# Patient Record
Sex: MALE | Race: WHITE | NOT HISPANIC OR LATINO | Employment: OTHER | URBAN - METROPOLITAN AREA
[De-identification: names, ages, dates, MRNs, and addresses within clinical notes are randomized per-mention and may not be internally consistent; named-entity substitution may affect disease eponyms.]

---

## 2020-05-02 ENCOUNTER — HOSPITAL ENCOUNTER (INPATIENT)
Facility: HOSPITAL | Age: 70
LOS: 5 days | Discharge: HOME HEALTH | End: 2020-05-08
Attending: EMERGENCY MEDICINE | Admitting: THORACIC SURGERY (CARDIOTHORACIC VASCULAR SURGERY)

## 2020-05-02 ENCOUNTER — APPOINTMENT (OUTPATIENT)
Dept: GENERAL RADIOLOGY | Facility: HOSPITAL | Age: 70
End: 2020-05-02

## 2020-05-02 DIAGNOSIS — I25.110 CORONARY ARTERY DISEASE INVOLVING NATIVE CORONARY ARTERY OF NATIVE HEART WITH UNSTABLE ANGINA PECTORIS (HCC): ICD-10-CM

## 2020-05-02 DIAGNOSIS — Z95.1 S/P CABG X 4: ICD-10-CM

## 2020-05-02 DIAGNOSIS — I21.4 SUBENDOCARDIAL MI FIRST EPISODE CARE (HCC): Primary | ICD-10-CM

## 2020-05-02 PROBLEM — E78.5 DYSLIPIDEMIA: Chronic | Status: ACTIVE | Noted: 2020-05-02

## 2020-05-02 PROBLEM — E78.5 DYSLIPIDEMIA: Status: ACTIVE | Noted: 2020-05-02

## 2020-05-02 PROBLEM — I10 ESSENTIAL HYPERTENSION: Chronic | Status: ACTIVE | Noted: 2020-05-02

## 2020-05-02 PROBLEM — I10 ESSENTIAL HYPERTENSION: Status: ACTIVE | Noted: 2020-05-02

## 2020-05-02 LAB
ANION GAP SERPL CALCULATED.3IONS-SCNC: 12 MMOL/L (ref 5–15)
APTT PPP: 25.2 SECONDS (ref 61–76.5)
APTT PPP: 37.6 SECONDS (ref 61–76.5)
BASOPHILS # BLD AUTO: 0.1 10*3/MM3 (ref 0–0.2)
BASOPHILS NFR BLD AUTO: 1.4 % (ref 0–1.5)
BUN BLD-MCNC: 17 MG/DL (ref 8–23)
BUN/CREAT SERPL: 16.8 (ref 7–25)
CALCIUM SPEC-SCNC: 8.9 MG/DL (ref 8.6–10.5)
CHLORIDE SERPL-SCNC: 104 MMOL/L (ref 98–107)
CO2 SERPL-SCNC: 26 MMOL/L (ref 22–29)
CREAT BLD-MCNC: 1.01 MG/DL (ref 0.76–1.27)
DEPRECATED RDW RBC AUTO: 45.5 FL (ref 37–54)
EOSINOPHIL # BLD AUTO: 0.2 10*3/MM3 (ref 0–0.4)
EOSINOPHIL NFR BLD AUTO: 3.2 % (ref 0.3–6.2)
ERYTHROCYTE [DISTWIDTH] IN BLOOD BY AUTOMATED COUNT: 14.1 % (ref 12.3–15.4)
GFR SERPL CREATININE-BSD FRML MDRD: 73 ML/MIN/1.73
GLUCOSE BLD-MCNC: 108 MG/DL (ref 65–99)
HCT VFR BLD AUTO: 46.3 % (ref 37.5–51)
HGB BLD-MCNC: 15.8 G/DL (ref 13–17.7)
INR PPP: 1 (ref 0.9–1.1)
LYMPHOCYTES # BLD AUTO: 1.7 10*3/MM3 (ref 0.7–3.1)
LYMPHOCYTES NFR BLD AUTO: 25 % (ref 19.6–45.3)
MCH RBC QN AUTO: 31.7 PG (ref 26.6–33)
MCHC RBC AUTO-ENTMCNC: 34.1 G/DL (ref 31.5–35.7)
MCV RBC AUTO: 93.2 FL (ref 79–97)
MONOCYTES # BLD AUTO: 0.7 10*3/MM3 (ref 0.1–0.9)
MONOCYTES NFR BLD AUTO: 9.8 % (ref 5–12)
NEUTROPHILS # BLD AUTO: 4.1 10*3/MM3 (ref 1.7–7)
NEUTROPHILS NFR BLD AUTO: 60.6 % (ref 42.7–76)
NRBC BLD AUTO-RTO: 0.1 /100 WBC (ref 0–0.2)
PLATELET # BLD AUTO: 269 10*3/MM3 (ref 140–450)
PMV BLD AUTO: 8.7 FL (ref 6–12)
POTASSIUM BLD-SCNC: 4 MMOL/L (ref 3.5–5.2)
PROTHROMBIN TIME: 10.5 SECONDS (ref 9.6–11.7)
RBC # BLD AUTO: 4.97 10*6/MM3 (ref 4.14–5.8)
SODIUM BLD-SCNC: 142 MMOL/L (ref 136–145)
TROPONIN T SERPL-MCNC: 0.4 NG/ML (ref 0–0.03)
TROPONIN T SERPL-MCNC: 0.5 NG/ML (ref 0–0.03)
WBC NRBC COR # BLD: 6.8 10*3/MM3 (ref 3.4–10.8)
WHOLE BLOOD HOLD SPECIMEN: NORMAL

## 2020-05-02 PROCEDURE — 93005 ELECTROCARDIOGRAM TRACING: CPT

## 2020-05-02 PROCEDURE — 99284 EMERGENCY DEPT VISIT MOD MDM: CPT

## 2020-05-02 PROCEDURE — 80048 BASIC METABOLIC PNL TOTAL CA: CPT | Performed by: EMERGENCY MEDICINE

## 2020-05-02 PROCEDURE — 99223 1ST HOSP IP/OBS HIGH 75: CPT | Performed by: INTERNAL MEDICINE

## 2020-05-02 PROCEDURE — G0378 HOSPITAL OBSERVATION PER HR: HCPCS

## 2020-05-02 PROCEDURE — 85730 THROMBOPLASTIN TIME PARTIAL: CPT | Performed by: EMERGENCY MEDICINE

## 2020-05-02 PROCEDURE — 71045 X-RAY EXAM CHEST 1 VIEW: CPT

## 2020-05-02 PROCEDURE — 85730 THROMBOPLASTIN TIME PARTIAL: CPT | Performed by: PHYSICIAN ASSISTANT

## 2020-05-02 PROCEDURE — 85610 PROTHROMBIN TIME: CPT | Performed by: EMERGENCY MEDICINE

## 2020-05-02 PROCEDURE — 25010000002 HEPARIN (PORCINE) PER 1000 UNITS: Performed by: EMERGENCY MEDICINE

## 2020-05-02 PROCEDURE — 99253 IP/OBS CNSLTJ NEW/EST LOW 45: CPT | Performed by: INTERNAL MEDICINE

## 2020-05-02 PROCEDURE — 85025 COMPLETE CBC W/AUTO DIFF WBC: CPT | Performed by: EMERGENCY MEDICINE

## 2020-05-02 PROCEDURE — 84484 ASSAY OF TROPONIN QUANT: CPT | Performed by: EMERGENCY MEDICINE

## 2020-05-02 PROCEDURE — 84484 ASSAY OF TROPONIN QUANT: CPT | Performed by: PHYSICIAN ASSISTANT

## 2020-05-02 PROCEDURE — 93005 ELECTROCARDIOGRAM TRACING: CPT | Performed by: EMERGENCY MEDICINE

## 2020-05-02 RX ORDER — ALUMINA, MAGNESIA, AND SIMETHICONE 2400; 2400; 240 MG/30ML; MG/30ML; MG/30ML
15 SUSPENSION ORAL EVERY 6 HOURS PRN
Status: DISCONTINUED | OUTPATIENT
Start: 2020-05-02 | End: 2020-05-04 | Stop reason: HOSPADM

## 2020-05-02 RX ORDER — CHOLECALCIFEROL (VITAMIN D3) 125 MCG
5 CAPSULE ORAL NIGHTLY PRN
Status: DISCONTINUED | OUTPATIENT
Start: 2020-05-02 | End: 2020-05-04 | Stop reason: HOSPADM

## 2020-05-02 RX ORDER — LISINOPRIL 10 MG/1
20 TABLET ORAL 2 TIMES DAILY
COMMUNITY
End: 2020-05-08 | Stop reason: HOSPADM

## 2020-05-02 RX ORDER — LISINOPRIL 5 MG/1
10 TABLET ORAL ONCE
Status: COMPLETED | OUTPATIENT
Start: 2020-05-02 | End: 2020-05-02

## 2020-05-02 RX ORDER — HEPARIN SODIUM 10000 [USP'U]/100ML
10.4 INJECTION, SOLUTION INTRAVENOUS
Status: DISCONTINUED | OUTPATIENT
Start: 2020-05-02 | End: 2020-05-03

## 2020-05-02 RX ORDER — MAGNESIUM SULFATE HEPTAHYDRATE 40 MG/ML
2 INJECTION, SOLUTION INTRAVENOUS AS NEEDED
Status: DISCONTINUED | OUTPATIENT
Start: 2020-05-02 | End: 2020-05-04 | Stop reason: HOSPADM

## 2020-05-02 RX ORDER — ACETAMINOPHEN 325 MG/1
650 TABLET ORAL EVERY 4 HOURS PRN
Status: DISCONTINUED | OUTPATIENT
Start: 2020-05-02 | End: 2020-05-04 | Stop reason: HOSPADM

## 2020-05-02 RX ORDER — ASPIRIN 81 MG/1
81 TABLET ORAL DAILY
Status: DISCONTINUED | OUTPATIENT
Start: 2020-05-03 | End: 2020-05-04 | Stop reason: HOSPADM

## 2020-05-02 RX ORDER — ONDANSETRON 4 MG/1
4 TABLET, FILM COATED ORAL EVERY 6 HOURS PRN
Status: DISCONTINUED | OUTPATIENT
Start: 2020-05-02 | End: 2020-05-04 | Stop reason: HOSPADM

## 2020-05-02 RX ORDER — LISINOPRIL 5 MG/1
10 TABLET ORAL
Status: DISCONTINUED | OUTPATIENT
Start: 2020-05-02 | End: 2020-05-02

## 2020-05-02 RX ORDER — SODIUM CHLORIDE 0.9 % (FLUSH) 0.9 %
10 SYRINGE (ML) INJECTION AS NEEDED
Status: DISCONTINUED | OUTPATIENT
Start: 2020-05-02 | End: 2020-05-04 | Stop reason: HOSPADM

## 2020-05-02 RX ORDER — VALACYCLOVIR HYDROCHLORIDE 500 MG/1
1000 TABLET, FILM COATED ORAL EVERY 12 HOURS PRN
Status: DISCONTINUED | OUTPATIENT
Start: 2020-05-02 | End: 2020-05-04 | Stop reason: HOSPADM

## 2020-05-02 RX ORDER — NITROGLYCERIN 0.4 MG/1
0.4 TABLET SUBLINGUAL
Status: DISCONTINUED | OUTPATIENT
Start: 2020-05-02 | End: 2020-05-04 | Stop reason: HOSPADM

## 2020-05-02 RX ORDER — ACETAMINOPHEN 160 MG/5ML
650 SOLUTION ORAL EVERY 4 HOURS PRN
Status: DISCONTINUED | OUTPATIENT
Start: 2020-05-02 | End: 2020-05-04 | Stop reason: HOSPADM

## 2020-05-02 RX ORDER — MELOXICAM 15 MG/1
15 TABLET ORAL DAILY
Status: DISCONTINUED | OUTPATIENT
Start: 2020-05-03 | End: 2020-05-02

## 2020-05-02 RX ORDER — ONDANSETRON 2 MG/ML
4 INJECTION INTRAMUSCULAR; INTRAVENOUS EVERY 6 HOURS PRN
Status: DISCONTINUED | OUTPATIENT
Start: 2020-05-02 | End: 2020-05-04 | Stop reason: HOSPADM

## 2020-05-02 RX ORDER — MELOXICAM 15 MG/1
15 TABLET ORAL DAILY
COMMUNITY
End: 2020-05-08 | Stop reason: HOSPADM

## 2020-05-02 RX ORDER — POTASSIUM CHLORIDE 20 MEQ/1
40 TABLET, EXTENDED RELEASE ORAL AS NEEDED
Status: DISCONTINUED | OUTPATIENT
Start: 2020-05-02 | End: 2020-05-04 | Stop reason: HOSPADM

## 2020-05-02 RX ORDER — LISINOPRIL 20 MG/1
20 TABLET ORAL 2 TIMES DAILY
Status: DISCONTINUED | OUTPATIENT
Start: 2020-05-03 | End: 2020-05-04 | Stop reason: HOSPADM

## 2020-05-02 RX ORDER — MAGNESIUM SULFATE 1 G/100ML
1 INJECTION INTRAVENOUS AS NEEDED
Status: DISCONTINUED | OUTPATIENT
Start: 2020-05-02 | End: 2020-05-04 | Stop reason: HOSPADM

## 2020-05-02 RX ORDER — ASPIRIN 325 MG
162 TABLET ORAL ONCE
Status: COMPLETED | OUTPATIENT
Start: 2020-05-02 | End: 2020-05-02

## 2020-05-02 RX ORDER — ATORVASTATIN CALCIUM 40 MG/1
40 TABLET, FILM COATED ORAL NIGHTLY
Status: DISCONTINUED | OUTPATIENT
Start: 2020-05-02 | End: 2020-05-04 | Stop reason: HOSPADM

## 2020-05-02 RX ORDER — SODIUM CHLORIDE 0.9 % (FLUSH) 0.9 %
10 SYRINGE (ML) INJECTION EVERY 12 HOURS SCHEDULED
Status: DISCONTINUED | OUTPATIENT
Start: 2020-05-02 | End: 2020-05-04 | Stop reason: HOSPADM

## 2020-05-02 RX ORDER — VALACYCLOVIR HYDROCHLORIDE 500 MG/1
1000 TABLET, FILM COATED ORAL 2 TIMES DAILY PRN
COMMUNITY
End: 2020-06-15 | Stop reason: SDUPTHER

## 2020-05-02 RX ORDER — NITROGLYCERIN 20 MG/100ML
5-200 INJECTION INTRAVENOUS
Status: DISCONTINUED | OUTPATIENT
Start: 2020-05-02 | End: 2020-05-04 | Stop reason: HOSPADM

## 2020-05-02 RX ORDER — BISACODYL 10 MG
10 SUPPOSITORY, RECTAL RECTAL DAILY PRN
Status: DISCONTINUED | OUTPATIENT
Start: 2020-05-02 | End: 2020-05-04 | Stop reason: HOSPADM

## 2020-05-02 RX ORDER — HEPARIN SODIUM 10000 [USP'U]/100ML
10.4 INJECTION, SOLUTION INTRAVENOUS
Status: DISCONTINUED | OUTPATIENT
Start: 2020-05-02 | End: 2020-05-02 | Stop reason: SDUPTHER

## 2020-05-02 RX ORDER — ACETAMINOPHEN 650 MG/1
650 SUPPOSITORY RECTAL EVERY 4 HOURS PRN
Status: DISCONTINUED | OUTPATIENT
Start: 2020-05-02 | End: 2020-05-04 | Stop reason: HOSPADM

## 2020-05-02 RX ADMIN — METOPROLOL TARTRATE 25 MG: 25 TABLET, FILM COATED ORAL at 20:48

## 2020-05-02 RX ADMIN — ATORVASTATIN CALCIUM 40 MG: 40 TABLET, FILM COATED ORAL at 20:48

## 2020-05-02 RX ADMIN — NITROGLYCERIN 5 MCG/MIN: 20 INJECTION INTRAVENOUS at 17:40

## 2020-05-02 RX ADMIN — LISINOPRIL 10 MG: 5 TABLET ORAL at 16:47

## 2020-05-02 RX ADMIN — ASPIRIN 325 MG ORAL TABLET 162 MG: 325 PILL ORAL at 15:56

## 2020-05-02 RX ADMIN — HEPARIN SODIUM AND DEXTROSE 10.4 UNITS/KG/HR: 10000; 5 INJECTION INTRAVENOUS at 15:55

## 2020-05-02 RX ADMIN — LISINOPRIL 10 MG: 5 TABLET ORAL at 20:53

## 2020-05-02 NOTE — ED PROVIDER NOTES
Subjective   History of Present Illness  Context: 70-year-old male presents with some sharp chest pain that started when he tried to jog about 3 weeks ago after a truck broke down.  He had noted some shortness of breath.  He states he quit his cholesterol medicine arthritis medicine and stopped his metoprolol after his blood pressure improved when he had weight loss.  He states he decided to try to get healthy about 3 weeks ago.  He noted chest discomfort last night and again today which she described more as a tightness that lasted about a half an hour he had no shortness of breath nausea diaphoresis.  He states this was different than the pain he had had previously.  Location: Chest  Quality: Tightness  Duration: Onset yesterday  Timing: Intermittent  Severity: Moderate   Modifying factors:  Associated signs and symptoms: He is noted he is been more short of breath with exertion recently    Review of Systems   Constitutional: Negative for diaphoresis, fever and unexpected weight change.        Reports he improved his diet has had recent proximally 5 to 10 pound weight loss   HENT: Negative for congestion and sore throat.    Eyes: Negative for pain.   Respiratory: Positive for chest tightness and shortness of breath. Negative for cough.    Cardiovascular: Positive for chest pain. Negative for leg swelling.   Gastrointestinal: Negative for abdominal pain, diarrhea, nausea and vomiting.   Genitourinary: Negative for dysuria and urgency.   Musculoskeletal: Positive for arthralgias.   Skin: Negative for rash.   Neurological: Negative for dizziness, weakness and headaches.   Psychiatric/Behavioral: Negative for confusion.       History reviewed. No pertinent past medical history.  Arthritis, hypertension, hyperlipidemia  Allergies   Allergen Reactions   • Neosporin [Bacitracin-Polymyxin B] Other (See Comments)     Burning sensation        History reviewed. No pertinent surgical history.    No family history on  file.    Social History     Socioeconomic History   • Marital status:      Spouse name: Not on file   • Number of children: Not on file   • Years of education: Not on file   • Highest education level: Not on file     Prior to Admission medications    Not on File     Lisinopril.  He recently discontinued metoprolol cholesterol medicine and medicine for his arthritis      Objective   Physical Exam  70 y.o. male Generally well-developed well-nourished,  Pupils equal round react to light.  Extraocular muscles intact, sclera nonicteric  Oropharynx clear, mucous membranes moist,   neck supple    Cardiovascular regular rate and rhythm without murmur gallop rub appreciated,  Respiratory lungs clear with equal breath sounds bilaterally  Abdomen soft and nontender without mass rebound or guarding  Extremities without tenderness edema  Neurologic without obvious focal findings noted motor sensory grossly intact extremities  Psychiatric cooperative  Dermatologic no significant rash or bruising noted    Procedures           ED Course      Results for orders placed or performed during the hospital encounter of 05/02/20   Basic Metabolic Panel   Result Value Ref Range    Glucose 108 (H) 65 - 99 mg/dL    BUN 17 8 - 23 mg/dL    Creatinine 1.01 0.76 - 1.27 mg/dL    Sodium 142 136 - 145 mmol/L    Potassium 4.0 3.5 - 5.2 mmol/L    Chloride 104 98 - 107 mmol/L    CO2 26.0 22.0 - 29.0 mmol/L    Calcium 8.9 8.6 - 10.5 mg/dL    eGFR Non African Amer 73 >60 mL/min/1.73    BUN/Creatinine Ratio 16.8 7.0 - 25.0    Anion Gap 12.0 5.0 - 15.0 mmol/L   Troponin   Result Value Ref Range    Troponin T 0.398 (C) 0.000 - 0.030 ng/mL   CBC Auto Differential   Result Value Ref Range    WBC 6.80 3.40 - 10.80 10*3/mm3    RBC 4.97 4.14 - 5.80 10*6/mm3    Hemoglobin 15.8 13.0 - 17.7 g/dL    Hematocrit 46.3 37.5 - 51.0 %    MCV 93.2 79.0 - 97.0 fL    MCH 31.7 26.6 - 33.0 pg    MCHC 34.1 31.5 - 35.7 g/dL    RDW 14.1 12.3 - 15.4 %    RDW-SD 45.5 37.0 -  "54.0 fl    MPV 8.7 6.0 - 12.0 fL    Platelets 269 140 - 450 10*3/mm3    Neutrophil % 60.6 42.7 - 76.0 %    Lymphocyte % 25.0 19.6 - 45.3 %    Monocyte % 9.8 5.0 - 12.0 %    Eosinophil % 3.2 0.3 - 6.2 %    Basophil % 1.4 0.0 - 1.5 %    Neutrophils, Absolute 4.10 1.70 - 7.00 10*3/mm3    Lymphocytes, Absolute 1.70 0.70 - 3.10 10*3/mm3    Monocytes, Absolute 0.70 0.10 - 0.90 10*3/mm3    Eosinophils, Absolute 0.20 0.00 - 0.40 10*3/mm3    Basophils, Absolute 0.10 0.00 - 0.20 10*3/mm3    nRBC 0.1 0.0 - 0.2 /100 WBC     Xr Chest 1 View    Result Date: 5/2/2020  No active cardiopulmonary disease.   Electronically Signed By-Albino Mckinley DO. On:5/2/2020 3:02 PM This report was finalized on [id number] by  Albino Mckinley DO..    Medications   sodium chloride 0.9 % flush 10 mL (has no administration in time range)   aspirin tablet 162 mg (has no administration in time range)   heparin bolus from bag 5,000 Units (has no administration in time range)   heparin 44073 units/250 ml (100 units/ml) in D5W (has no administration in time range)   heparin bolus from bag 2,500 Units (has no administration in time range)   heparin bolus from bag 5,000 Units (has no administration in time range)     /78   Pulse 58   Temp 98.2 °F (36.8 °C) (Oral)   Resp 18   Ht 188 cm (74\")   Wt 95.7 kg (210 lb 15.7 oz)   SpO2 97%   BMI 27.09 kg/m²                                        MDM  Chart review: No previous visits  Comorbidity: As per past history  Differential: Unstable angina, reflux, congestive heart failure  My EKG interpretation: Sinus rhythm without acute abnormality or previous to compare to  Lab: Troponin elevated at 0.398, CBC normal basic metabolic panel essentially normal glucose 108  Radiology: I reviewed chest x-ray no acute cardiopulmonary abnormality noted  Discussion/treatment: Patient IV placed.  He was given aspirin and started on heparin.  He is currently pain-free.  He was discussed with PA for the hospitalist " and Dr. Godinez.  He will be admitted for further care and evaluation he will require cardiac catheterization for definitive evaluation  He was evaluated using appropriate PPE      Final diagnoses:   Subendocardial MI first episode care (CMS/[facility])            Justin Toth MD  05/02/20 4019

## 2020-05-02 NOTE — CONSULTS
Cardiology Consult Note      REQUESTING PHYSICIAN    Riaz Gutierrez MD    PATIENT IDENTIFICATION    Name:@ Age: 70 y.o. Sex:@ : @ MRN:@      Cardiology assessment and plan    Non-ST elevation myocardial infarction  Chest pain  Coronary artery disease  Hypertension/uncontrolled hypertension  Hyperlipidemia  EKG with no evidence of any ST elevation    Plan  Heparin drip  Recommend aspirin statin beta-blockers  Start patient on nitrates  Aggressive blood pressure control  Aggressive risk factor modification  Monitor patient closely  Plan for left heart catheterization and coronary angiography tomorrow  N.p.o. after midnight  Risk benefits and alternatives to cardiac catheterization discussed with the patient  Further recommendations based on the results of the cardiac catheterization        REASON FOR CONSULTATION:  70-year-old male with no known history of ischemic heart disease.  Patient does have a risk factors that include hypertension, dyslipidemia.      CC:  Chest discomfort    HISTORY OF PRESENT ILLNESS:   Patient presented to the emergency department with report of chest discomfort described as a pain/pressure in his midsternal chest area.  Patient stated this is been occurring off and on for the last several days.  He reports he has undertaken a more healthy lifestyle with jogging, dieting and has recently lost approximately 10 pounds.  Patient checks his blood pressures at home noting that a were much lower.  He self discontinued his blood pressure medication and statin.  This morning, he midsternal chest discomfort was more severe and sharp in nature.  He decided to come to the emergency department for further examination.  Patient denies any radiation of the pain, no associated symptoms such as nausea or diaphoresis.  He has had no shortness of breath.  In the ER, work-up included troponin which resulted at 0.398.  Additional labs unremarkable.  EKG shows normal sinus rhythm.  Chest x-ray  "with no acute findings.      REVIEW OF SYSTEMS:  Pertinent items are noted in HPI, all other systems reviewed and negative  Patient denies any headache no nausea vomiting diarrhea no hematemesis no melena no abdominal pain no dizziness no syncope no musculoskeletal symptoms no  symptoms    OBJECTIVE       ASSESSMENT/PLAN    Subendocardial MI first episode care (CMS/[facility])    Essential hypertension    Dyslipidemia          Vital Signs  Visit Vitals  BP (!) 202/100 (BP Location: Left arm, Patient Position: Lying)   Pulse 66   Temp 98.4 °F (36.9 °C) (Oral)   Resp 18   Ht 188 cm (74\")   Wt 94 kg (207 lb 3.7 oz)   SpO2 98%   BMI 26.61 kg/m²     Oxygen Therapy  SpO2: 98 %  Pulse Oximetry Type: Intermittent  Device (Oxygen Therapy): room air  Device (Oxygen Therapy): room air  Flowsheet Rows      First Filed Value   Admission Height  188 cm (74\") Documented at 05/02/2020 1403   Admission Weight  95.7 kg (210 lb 15.7 oz) Documented at 05/02/2020 1403        Intake & Output (last 3 days)     None        Lines, Drains & Airways    Active LDAs     Name:   Placement date:   Placement time:   Site:   Days:    Peripheral IV 05/02/20 1428 Right Antecubital   05/02/20    1428    Antecubital   less than 1                MEDICAL HISTORY    History reviewed. No pertinent past medical history.     SURGICAL HISTORY    History reviewed. No pertinent surgical history.     FAMILY HISTORY    No family history on file.    SOCIAL HISTORY    Social History     Tobacco Use   • Smoking status: Not on file   Substance Use Topics   • Alcohol use: Not on file        ALLERGIES    Allergies   Allergen Reactions   • Neosporin [Bacitracin-Polymyxin B] Other (See Comments)     Burning sensation               BP (!) 202/100 (BP Location: Left arm, Patient Position: Lying)   Pulse 66   Temp 98.4 °F (36.9 °C) (Oral)   Resp 18   Ht 188 cm (74\")   Wt 94 kg (207 lb 3.7 oz)   SpO2 98%   BMI 26.61 kg/m²   Intake/Output last 3 shifts:  No intake/output " data recorded.  Intake/Output this shift:  No intake/output data recorded.    PHYSICAL EXAM:    General: Alert, cooperative, no distress, appears stated age  Head:  Normocephalic, atraumatic, mucous membranes moist  Eyes:  Conjunctiva/corneas clear, EOM's intact     Neck:  Supple,  no adenopathy;      Lungs: Clear to auscultation bilaterally, no wheezes rhonchi rales are noted  Chest wall: No tenderness  Heart::  Regular rate and rhythm, S1 and S2 normal, no murmur, rub or gallop  Abdomen: Soft, non-tender, nondistended bowel sounds active  Extremities: No cyanosis, clubbing, or edema groin soft no hematoma.  Amputation of right middle finger  Pulses: 2+ and symmetric all extremities  Skin:  No rashes or lesions  Neuro/psych: A&O x3. CN II through XII are grossly intact with appropriate affect      Scheduled Meds:        [START ON 5/3/2020] aspirin 81 mg Oral Daily   atorvastatin 40 mg Oral Nightly   lisinopril 10 mg Oral Q24H   metoprolol tartrate 25 mg Oral Q12H       Continuous Infusions:      heparin 10.4 Units/kg/hr Last Rate: 10.4 Units/kg/hr (05/02/20 1555)       PRN Meds:    heparin  •  heparin  •  [COMPLETED] Insert peripheral IV **AND** sodium chloride        Results Review:     I reviewed the patient's new clinical results.    CBC    Results from last 7 days   Lab Units 05/02/20  1442   WBC 10*3/mm3 6.80   HEMOGLOBIN g/dL 15.8   PLATELETS 10*3/mm3 269     Cr Clearance Estimated Creatinine Clearance: 93.1 mL/min (by C-G formula based on SCr of 1.01 mg/dL).  Coag   Results from last 7 days   Lab Units 05/02/20  1442   INR  1.00   APTT seconds 25.2*     HbA1C No results found for: HGBA1C  Blood Glucose No results found for: POCGLU  Infection     CMP   Results from last 7 days   Lab Units 05/02/20  1442   SODIUM mmol/L 142   POTASSIUM mmol/L 4.0   CHLORIDE mmol/L 104   CO2 mmol/L 26.0   BUN mg/dL 17   CREATININE mg/dL 1.01   GLUCOSE mg/dL 108*     ABG      UA      LONNIE  No results found for: POCMETH,  POCAMPHET, POCBARBITUR, POCBENZO, POCCOCAINE, POCOPIATES, POCOXYCODO, POCPHENCYC, POCPROPOXY, POCTHC, POCTRICYC  Lysis Labs   Results from last 7 days   Lab Units 05/02/20  1442   INR  1.00   APTT seconds 25.2*   HEMOGLOBIN g/dL 15.8   PLATELETS 10*3/mm3 269   CREATININE mg/dL 1.01     Radiology(recent) Xr Chest 1 View    Result Date: 5/2/2020  No active cardiopulmonary disease.   Electronically Signed By-Albino Mckinley DO. On:5/2/2020 3:02 PM This report was finalized on [id number] by  Albino Mckinley DO..        Results from last 7 days   Lab Units 05/02/20  1442   TROPONIN T ng/mL 0.398*       Xrays, labs reviewed personally by physician.    ECG/EMG Results (most recent)     Procedure Component Value Units Date/Time    ECG 12 Lead [[id number]] Collected:  05/02/20 1412     Updated:  05/02/20 1415    Narrative:       HEART RATE= 69  bpm  RR Interval= 868  ms  WV Interval= 210  ms  P Horizontal Axis= -8  deg  P Front Axis= 43  deg  QRSD Interval= 76  ms  QT Interval= 382  ms  QRS Axis= 9  deg  T Wave Axis= 25  deg  - NORMAL ECG -  Sinus rhythm  No previous ECG available for comparison  Electronically Signed By:   Date and Time of Study: 2020-05-02 14:12:41            Medication Review:   I have reviewed the patient's current medication list  Scheduled Meds:    [START ON 5/3/2020] aspirin 81 mg Oral Daily   atorvastatin 40 mg Oral Nightly   lisinopril 10 mg Oral Q24H   metoprolol tartrate 25 mg Oral Q12H     Continuous Infusions:    heparin 10.4 Units/kg/hr Last Rate: 10.4 Units/kg/hr (05/02/20 3455)     PRN Meds:.heparin  •  heparin  •  [COMPLETED] Insert peripheral IV **AND** sodium chloride    Imaging:  Imaging Results (Last 72 Hours)     Procedure Component Value Units Date/Time    XR Chest 1 View [[id number]] Collected:  05/02/20 1502     Updated:  05/02/20 1504    Narrative:       XR CHEST 1 VW-     Date of Exam: 5/2/2020 2:56 PM     Indication: pain  70-year-old male with chest discomfort, shortness  of  breath     Comparison: None available.     Technique: 1 view(s) of the chest were obtained.     FINDINGS: The lungs are well expanded. Cardiac, hilar and  mediastinal silhouettes are normal. No pneumothorax, pleural effusion or  focal pulmonary parenchymal opacity. Pulmonary vascularity is within  normal limits. The trachea is midline. Visualized bony structures are  intact.       Impression:       No active cardiopulmonary disease.        Electronically Signed By-Albino Mckinley DO. On:5/2/2020 3:02 PM  This report was finalized on [id number] by  Albino Mckinley DO..

## 2020-05-02 NOTE — H&P
[facility] Medicine Services      Patient Name: Keshawn Lr  : 1950  MRN: [id number]  Primary Care Physician: Daphne, No Known  Date of admission: 2020    Patient Care Team:  Provider, No Known as PCP - General          Subjective   History Present Illness     Chief Complaint:   Chief Complaint   Patient presents with   • Chest Pain       Mr. Lr is a 70 y.o. male presents to [facility] ER 2020 complaining dull, pressure-like chest pain that started around noon 3 PM yesterday and lasted for about 20 minutes.  He said nothing made it better or worse but resolved on its own.  Then he had the same pain again at 4 AM this morning and lasted about 30 minutes.  This time he took ibuprofen and fell back asleep.  He came into the ED today because he just did not feel right.  Patient otherwise denies nausea, vomiting, fever, chills, diarrhea, constipation, abdominal pain or recent illness.  Patient states that he first noticed some chest tightness about 3 weeks ago but did not think anything of it at that time.  Patient states that over the past few months he has had some adjusting of his blood pressure meds as they have not been working.  Patient states that he change his diet to just fruit and vegetables and lost about 10 pounds in 1 week.  His blood pressure also went down accordingly.  His blood pressure at home was in the 150s over 90s and after his diet it was in the 130s over 80s.    In the ED, troponin of 0.398, otherwise unremarkable BMP and CBC.  Chest x-ray read no acute cardiopulmonary process.  EKG showed normal sinus rhythm at 69 bpm and no acute ST changes.  Patient was given aspirin, heparin drip, and ER provider consulted with cardiology in the ED for heart cath for tomorrow.    Review of Systems   Constitution: Negative.   HENT: Negative.    Cardiovascular: Positive for chest pain (dull, pressure like pain in center of chest).   Respiratory:  Negative.    Skin: Negative.    Musculoskeletal: Negative.    Gastrointestinal: Negative.    Genitourinary: Negative.    Neurological: Negative.    Psychiatric/Behavioral: Negative.            Personal History     Past Medical History:   Past Medical History:   Diagnosis Date   • Hyperlipidemia    • Hypertension        Surgical History:      Past Surgical History:   Procedure Laterality Date   • FINGER SURGERY     • UVULECTOMY             Family History: family history includes Atrial fibrillation in his sister; Hypertension in his brother, father, and mother. Otherwise pertinent FHx was reviewed and unremarkable.     Social History:  reports that he has never smoked. He has never used smokeless tobacco. He reports that he drinks alcohol. He reports that he does not use drugs.      Medications:  Prior to Admission medications    Medication Sig Start Date End Date Taking? Authorizing Provider   lisinopril (PRINIVIL,ZESTRIL) 10 MG tablet Take 10 mg by mouth Daily.   Yes ProviderLeilani MD   metoprolol tartrate (LOPRESSOR) 25 MG tablet Take 25 mg by mouth 2 (Two) Times a Day.   Yes Leilani Serna MD   NON FORMULARY Daily. Cholesterol med. Last dose was over 3 weeks ago.   Yes ProviderLeilani MD       Allergies:    Allergies   Allergen Reactions   • Neosporin [Bacitracin-Polymyxin B] Other (See Comments)     Burning sensation        Objective   Objective     Vital Signs  Temp:  [98.2 °F (36.8 °C)-98.4 °F (36.9 °C)] 98.2 °F (36.8 °C)  Heart Rate:  [58-80] 62  Resp:  [16-22] 16  BP: (148-204)/() 164/92  SpO2:  [96 %-100 %] 96 %  on   ;   Device (Oxygen Therapy): room air  Body mass index is 26.61 kg/m².    Physical Exam   Constitutional: He is oriented to person, place, and time. He appears well-developed and well-nourished. No distress.   HENT:   Head: Normocephalic and atraumatic.   Nose: Nose normal.   Mouth/Throat: No oropharyngeal exudate.   Eyes: Conjunctivae and EOM are normal.   Neck:  Normal range of motion.   Cardiovascular: Normal rate, regular rhythm, normal heart sounds and intact distal pulses.   S1, S2 audible.   Pulmonary/Chest: Effort normal and breath sounds normal. No respiratory distress. He has no wheezes.   On room air.   Abdominal: Soft. Bowel sounds are normal. He exhibits no distension. There is no tenderness.   Musculoskeletal: Normal range of motion. He exhibits no edema, tenderness or deformity.   Neurological: He is alert and oriented to person, place, and time. No cranial nerve deficit.   Skin: Skin is warm. No rash noted. He is not diaphoretic. No erythema.   Psychiatric: He has a normal mood and affect. His behavior is normal.   Nursing note and vitals reviewed.      Results Review:  I have personally reviewed most recent cardiac tracings, lab results and radiology images and interpretations and agree with findings.    Results from last 7 days   Lab Units 05/02/20  1442   WBC 10*3/mm3 6.80   HEMOGLOBIN g/dL 15.8   HEMATOCRIT % 46.3   PLATELETS 10*3/mm3 269   INR  1.00     Results from last 7 days   Lab Units 05/02/20  2206 05/02/20  1442   SODIUM mmol/L  --  142   POTASSIUM mmol/L  --  4.0   CHLORIDE mmol/L  --  104   CO2 mmol/L  --  26.0   BUN mg/dL  --  17   CREATININE mg/dL  --  1.01   GLUCOSE mg/dL  --  108*   CALCIUM mg/dL  --  8.9   TROPONIN T ng/mL 0.501* 0.398*     Estimated Creatinine Clearance: 93.1 mL/min (by C-G formula based on SCr of 1.01 mg/dL).  Brief Urine Lab Results     None          Microbiology Results (last 10 days)     ** No results found for the last 240 hours. **          ECG/EMG Results (most recent)     Procedure Component Value Units Date/Time    ECG 12 Lead [[id number]] Collected:  05/02/20 1412     Updated:  05/02/20 1415    Narrative:       HEART RATE= 69  bpm  RR Interval= 868  ms  ID Interval= 210  ms  P Horizontal Axis= -8  deg  P Front Axis= 43  deg  QRSD Interval= 76  ms  QT Interval= 382  ms  QRS Axis= 9  deg  T Wave Axis= 25  deg  -  NORMAL ECG -  Sinus rhythm  No previous ECG available for comparison  Electronically Signed By:   Date and Time of Study: 2020-05-02 14:12:41                    Xr Chest 1 View    Result Date: 5/2/2020  No active cardiopulmonary disease.   Electronically Signed By-Albino Mckinley DO. On:5/2/2020 3:02 PM This report was finalized on [id number] by  Albino Mckinley DO..        Estimated Creatinine Clearance: 93.1 mL/min (by C-G formula based on SCr of 1.01 mg/dL).    Assessment/Plan   Assessment/Plan       Active Hospital Problems    Diagnosis  POA   • **Subendocardial MI first episode care (CMS/[facility]) [I21.4]  Yes   • Essential hypertension [I10]  Yes   • Dyslipidemia [E78.5]  Yes      Resolved Hospital Problems   No resolved problems to display.         Non-ST elevation MI  -Rule out ACS  -Initial troponin of 0.398, otherwise unremarkable BMP and CBC  -Chest x-ray read no acute cardiopulmonary process.    -EKG showed normal sinus rhythm at 69 bpm and no acute ST changes.  -Patient is afebrile, pulse 70s on room air oxygen at 97% SPO2  -Initial /111 but has come down to 152/78.  - Patient was given aspirin, heparin drip in ED  -ER provider consulted with cardiology in the ED for heart cath for tomorrow.  -Check serial troponins, check lipid panel  -Nitro drip, heparin drip  -Heart healthy diet and n.p.o. at midnight    Essential hypertension, uncontrolled  -continue lopressor and lisinopril    Hyperlipidemia  -Currently diet controlled    Arthritis  -hold home mobic for cath in AM      VTE Prophylaxis - heparin drip, SCDs  Mechanical Order History:     None      Pharmalogical Order History:     Ordered     Dose Route Frequency Stop    05/02/20 1530  heparin bolus from bag 5,000 Units      5,000 Units IV Once --    05/02/20 1530  heparin 90391 units/250 ml (100 units/ml) in D5W  9.95 mL/hr      10.4 Units/kg/hr IV Titrated --          CODE STATUS:    Code Status and Medical Interventions:   Ordered at: 05/02/20  1643     Code Status:    CPR     Medical Interventions (Level of Support Prior to Arrest):    Full       This patient has been examined wearing appropriate Personal Protective Equipment . 05/02/20      I discussed the patient's findings and my recommendations with patient.        Electronically signed by MAIRA Rapp, 05/02/20, 3:36 PM.  [facility] ([facility]

## 2020-05-02 NOTE — PLAN OF CARE
Problem: Patient Care Overview  Goal: Plan of Care Review  Outcome: Ongoing (interventions implemented as appropriate)  Flowsheets (Taken 5/2/2020 6915)  Progress: no change  Plan of Care Reviewed With: patient  Outcome Summary: New admit- patient is not complaining of any chest pain and has not had any chest pain since yesterday, labs WNL, BP is elevated-started on nitro drip and heparin drip and all home BP meds are restarted but still elevated and MD is aware. Meds need to be reconciled again because they were wrong, but fixed now, page out to MD to update. Cardiology saw the patient and will have a heart cath tomorrow, next aPTT at 2200, patient gets very anxious at the hospital as well and says his BP usually is stable at home and is high because of being in the hospital. Will monitor

## 2020-05-03 ENCOUNTER — APPOINTMENT (OUTPATIENT)
Dept: CARDIOLOGY | Facility: HOSPITAL | Age: 70
End: 2020-05-03

## 2020-05-03 ENCOUNTER — ANESTHESIA EVENT (OUTPATIENT)
Dept: PERIOP | Facility: HOSPITAL | Age: 70
End: 2020-05-03

## 2020-05-03 PROBLEM — I25.110 CORONARY ARTERY DISEASE INVOLVING NATIVE CORONARY ARTERY OF NATIVE HEART WITH UNSTABLE ANGINA PECTORIS: Status: ACTIVE | Noted: 2020-05-02

## 2020-05-03 PROBLEM — I25.118 CORONARY ARTERY DISEASE OF NATIVE ARTERY OF NATIVE HEART WITH STABLE ANGINA PECTORIS: Status: ACTIVE | Noted: 2020-05-03

## 2020-05-03 LAB
ABO GROUP BLD: NORMAL
ALBUMIN SERPL-MCNC: 4.1 G/DL (ref 3.5–5.2)
ALBUMIN/GLOB SERPL: 1.4 G/DL
ALP SERPL-CCNC: 82 U/L (ref 39–117)
ALT SERPL W P-5'-P-CCNC: 31 U/L (ref 1–41)
ANION GAP SERPL CALCULATED.3IONS-SCNC: 11 MMOL/L (ref 5–15)
ANION GAP SERPL CALCULATED.3IONS-SCNC: 9 MMOL/L (ref 5–15)
APTT PPP: 24.7 SECONDS (ref 61–76.5)
APTT PPP: 42.3 SECONDS (ref 61–76.5)
ARTERIAL PATENCY WRIST A: POSITIVE
AST SERPL-CCNC: 35 U/L (ref 1–40)
ATMOSPHERIC PRESS: ABNORMAL MM[HG]
BACTERIA UR QL AUTO: ABNORMAL /HPF
BASE EXCESS BLDA CALC-SCNC: 0.3 MMOL/L (ref 0–3)
BASOPHILS # BLD AUTO: 0.1 10*3/MM3 (ref 0–0.2)
BASOPHILS NFR BLD AUTO: 0.9 % (ref 0–1.5)
BDY SITE: ABNORMAL
BH CV XLRA MEAS - DIST GSV CALF DIST LEFT: 0.17 CM
BH CV XLRA MEAS - DIST GSV CALF DIST RIGHT: 0.17 CM
BH CV XLRA MEAS - DIST GSV THIGH DIST LEFT: 0.14 CM
BH CV XLRA MEAS - DIST GSV THIGH DIST RIGHT: 0.18 CM
BH CV XLRA MEAS - MID GSV CALF LEFT: 0.1 CM
BH CV XLRA MEAS - MID GSV CALF RIGHT: 0.18 CM
BH CV XLRA MEAS - MID GSV THIGH  LEFT: 0.1 CM
BH CV XLRA MEAS - MID GSV THIGH  RIGHT: 0.13 CM
BH CV XLRA MEAS - PROX GSV CALF DIST LEFT: 0.13 CM
BH CV XLRA MEAS - PROX GSV CALF DIST RIGHT: 0.16 CM
BH CV XLRA MEAS - PROX GSV THIGH  LEFT: 0.15 CM
BH CV XLRA MEAS - PROX GSV THIGH  RIGHT: 0.09 CM
BH CV XLRA MEAS LEFT CCA RATIO VEL: 116 CM/SEC
BH CV XLRA MEAS LEFT DIST CCA EDV: -16.5 CM/SEC
BH CV XLRA MEAS LEFT DIST CCA PSV: -67.8 CM/SEC
BH CV XLRA MEAS LEFT DIST ICA EDV: -22.5 CM/SEC
BH CV XLRA MEAS LEFT DIST ICA PSV: -63.5 CM/SEC
BH CV XLRA MEAS LEFT ICA RATIO VEL: -80.6 CM/SEC
BH CV XLRA MEAS LEFT ICA/CCA RATIO: -0.69
BH CV XLRA MEAS LEFT PROX CCA EDV: 30.1 CM/SEC
BH CV XLRA MEAS LEFT PROX CCA PSV: 116 CM/SEC
BH CV XLRA MEAS LEFT PROX ECA PSV: -118 CM/SEC
BH CV XLRA MEAS LEFT PROX ICA EDV: -26 CM/SEC
BH CV XLRA MEAS LEFT PROX ICA PSV: -80.6 CM/SEC
BH CV XLRA MEAS LEFT PROX SCLA PSV: 154 CM/SEC
BH CV XLRA MEAS LEFT VERTEBRAL A PSV: 58.7 CM/SEC
BH CV XLRA MEAS RIGHT CCA RATIO VEL: 93.2 CM/SEC
BH CV XLRA MEAS RIGHT DIST CCA EDV: 18.4 CM/SEC
BH CV XLRA MEAS RIGHT DIST CCA PSV: 77.3 CM/SEC
BH CV XLRA MEAS RIGHT DIST ICA EDV: -26.5 CM/SEC
BH CV XLRA MEAS RIGHT DIST ICA PSV: -74.7 CM/SEC
BH CV XLRA MEAS RIGHT ICA RATIO VEL: -80.4 CM/SEC
BH CV XLRA MEAS RIGHT ICA/CCA RATIO: -0.86
BH CV XLRA MEAS RIGHT PROX CCA EDV: 17.4 CM/SEC
BH CV XLRA MEAS RIGHT PROX CCA PSV: 93.2 CM/SEC
BH CV XLRA MEAS RIGHT PROX ECA PSV: -138 CM/SEC
BH CV XLRA MEAS RIGHT PROX ICA EDV: -28.6 CM/SEC
BH CV XLRA MEAS RIGHT PROX ICA PSV: -80.4 CM/SEC
BH CV XLRA MEAS RIGHT PROX SCLA PSV: 145 CM/SEC
BH CV XLRA MEAS RIGHT VERTEBRAL A PSV: -60 CM/SEC
BILIRUB SERPL-MCNC: 0.7 MG/DL (ref 0.2–1.2)
BILIRUB UR QL STRIP: NEGATIVE
BLD GP AB SCN SERPL QL: NEGATIVE
BUN BLD-MCNC: 11 MG/DL (ref 8–23)
BUN BLD-MCNC: 14 MG/DL (ref 8–23)
BUN/CREAT SERPL: 11.8 (ref 7–25)
BUN/CREAT SERPL: 13.9 (ref 7–25)
CA-I BLDA-SCNC: 0.96 MMOL/L (ref 1.15–1.33)
CALCIUM SPEC-SCNC: 8.5 MG/DL (ref 8.6–10.5)
CALCIUM SPEC-SCNC: 8.6 MG/DL (ref 8.6–10.5)
CHLORIDE SERPL-SCNC: 105 MMOL/L (ref 98–107)
CHLORIDE SERPL-SCNC: 106 MMOL/L (ref 98–107)
CHOLEST SERPL-MCNC: 142 MG/DL (ref 0–200)
CLARITY UR: CLEAR
CLOSE TME COLL+ADP + EPINEP PNL BLD: 95 % (ref 86–100)
CO2 BLDA-SCNC: 25.7 MMOL/L (ref 22–29)
CO2 SERPL-SCNC: 24 MMOL/L (ref 22–29)
CO2 SERPL-SCNC: 27 MMOL/L (ref 22–29)
COLOR UR: YELLOW
CREAT BLD-MCNC: 0.93 MG/DL (ref 0.76–1.27)
CREAT BLD-MCNC: 1.01 MG/DL (ref 0.76–1.27)
D-LACTATE SERPL-SCNC: 0.5 MMOL/L (ref 0.5–2)
DEPRECATED RDW RBC AUTO: 45.5 FL (ref 37–54)
EOSINOPHIL # BLD AUTO: 0.3 10*3/MM3 (ref 0–0.4)
EOSINOPHIL NFR BLD AUTO: 4.4 % (ref 0.3–6.2)
ERYTHROCYTE [DISTWIDTH] IN BLOOD BY AUTOMATED COUNT: 14 % (ref 12.3–15.4)
GFR SERPL CREATININE-BSD FRML MDRD: 73 ML/MIN/1.73
GFR SERPL CREATININE-BSD FRML MDRD: 81 ML/MIN/1.73
GIANT PLATELETS: NORMAL
GLOBULIN UR ELPH-MCNC: 3 GM/DL
GLUCOSE BLD-MCNC: 118 MG/DL (ref 65–99)
GLUCOSE BLD-MCNC: 121 MG/DL (ref 65–99)
GLUCOSE BLDC GLUCOMTR-MCNC: 118 MG/DL (ref 74–100)
GLUCOSE UR STRIP-MCNC: NEGATIVE MG/DL
HCO3 BLDA-SCNC: 24.6 MMOL/L (ref 21–28)
HCT VFR BLD AUTO: 41.3 % (ref 37.5–51)
HCT VFR BLDA CALC: 44 % (ref 38–51)
HDLC SERPL-MCNC: 37 MG/DL (ref 40–60)
HEMODILUTION: NO
HGB BLD-MCNC: 14.3 G/DL (ref 13–17.7)
HGB BLDA-MCNC: 15 G/DL (ref 12–17)
HGB UR QL STRIP.AUTO: ABNORMAL
HOROWITZ INDEX BLD+IHG-RTO: 21 %
HYALINE CASTS UR QL AUTO: ABNORMAL /LPF
INR PPP: 1.06 (ref 0.9–1.1)
KETONES UR QL STRIP: NEGATIVE
LDLC SERPL CALC-MCNC: 83 MG/DL (ref 0–100)
LDLC/HDLC SERPL: 2.25 {RATIO}
LEUKOCYTE ESTERASE UR QL STRIP.AUTO: NEGATIVE
LYMPHOCYTES # BLD AUTO: 2 10*3/MM3 (ref 0.7–3.1)
LYMPHOCYTES NFR BLD AUTO: 29.6 % (ref 19.6–45.3)
MAGNESIUM SERPL-MCNC: 2.2 MG/DL (ref 1.6–2.4)
MCH RBC QN AUTO: 31.8 PG (ref 26.6–33)
MCHC RBC AUTO-ENTMCNC: 34.5 G/DL (ref 31.5–35.7)
MCV RBC AUTO: 92 FL (ref 79–97)
MODALITY: ABNORMAL
MONOCYTES # BLD AUTO: 0.6 10*3/MM3 (ref 0.1–0.9)
MONOCYTES NFR BLD AUTO: 8.5 % (ref 5–12)
MRSA DNA SPEC QL NAA+PROBE: NORMAL
NEUTROPHILS # BLD AUTO: 3.9 10*3/MM3 (ref 1.7–7)
NEUTROPHILS NFR BLD AUTO: 56.6 % (ref 42.7–76)
NITRITE UR QL STRIP: NEGATIVE
NRBC BLD AUTO-RTO: 0.2 /100 WBC (ref 0–0.2)
PCO2 BLDA: 37.9 MM HG (ref 35–48)
PH BLDA: 7.42 PH UNITS (ref 7.35–7.45)
PH UR STRIP.AUTO: 7.5 [PH] (ref 5–8)
PLATELET # BLD AUTO: 237 10*3/MM3 (ref 140–450)
PMV BLD AUTO: 8.8 FL (ref 6–12)
PO2 BLDA: 80.5 MM HG (ref 83–108)
POTASSIUM BLD-SCNC: 4.2 MMOL/L (ref 3.5–5.2)
POTASSIUM BLD-SCNC: 4.7 MMOL/L (ref 3.5–5.2)
POTASSIUM BLDA-SCNC: 3.9 MMOL/L (ref 3.5–4.5)
PROT SERPL-MCNC: 7.1 G/DL (ref 6–8.5)
PROT UR QL STRIP: NEGATIVE
PROTHROMBIN TIME: 11 SECONDS (ref 9.6–11.7)
RBC # BLD AUTO: 4.49 10*6/MM3 (ref 4.14–5.8)
RBC # UR: ABNORMAL /HPF
RBC MORPH BLD: NORMAL
REF LAB TEST METHOD: ABNORMAL
RH BLD: POSITIVE
SAO2 % BLDCOA: 96.1 % (ref 94–98)
SARS-COV-2 RNA RESP QL NAA+PROBE: NOT DETECTED
SODIUM BLD-SCNC: 140 MMOL/L (ref 136–145)
SODIUM BLD-SCNC: 142 MMOL/L (ref 136–145)
SODIUM BLDA-SCNC: 140 MMOL/L (ref 138–146)
SP GR UR STRIP: 1.07 (ref 1–1.03)
SQUAMOUS #/AREA URNS HPF: ABNORMAL /HPF
T&S EXPIRATION DATE: NORMAL
TRIGL SERPL-MCNC: 108 MG/DL (ref 0–150)
TROPONIN T SERPL-MCNC: 0.4 NG/ML (ref 0–0.03)
UROBILINOGEN UR QL STRIP: ABNORMAL
VLDLC SERPL-MCNC: 21.6 MG/DL
WBC MORPH BLD: NORMAL
WBC NRBC COR # BLD: 6.9 10*3/MM3 (ref 3.4–10.8)
WBC UR QL AUTO: ABNORMAL /HPF

## 2020-05-03 PROCEDURE — 0 IOPAMIDOL PER 1 ML: Performed by: INTERNAL MEDICINE

## 2020-05-03 PROCEDURE — 86900 BLOOD TYPING SEROLOGIC ABO: CPT

## 2020-05-03 PROCEDURE — 80061 LIPID PANEL: CPT | Performed by: NURSE PRACTITIONER

## 2020-05-03 PROCEDURE — 4A023N7 MEASUREMENT OF CARDIAC SAMPLING AND PRESSURE, LEFT HEART, PERCUTANEOUS APPROACH: ICD-10-PCS | Performed by: INTERNAL MEDICINE

## 2020-05-03 PROCEDURE — 82803 BLOOD GASES ANY COMBINATION: CPT

## 2020-05-03 PROCEDURE — 99254 IP/OBS CNSLTJ NEW/EST MOD 60: CPT | Performed by: NURSE PRACTITIONER

## 2020-05-03 PROCEDURE — 93458 L HRT ARTERY/VENTRICLE ANGIO: CPT | Performed by: INTERNAL MEDICINE

## 2020-05-03 PROCEDURE — 80048 BASIC METABOLIC PNL TOTAL CA: CPT | Performed by: PHYSICIAN ASSISTANT

## 2020-05-03 PROCEDURE — 83036 HEMOGLOBIN GLYCOSYLATED A1C: CPT | Performed by: NURSE PRACTITIONER

## 2020-05-03 PROCEDURE — 87635 SARS-COV-2 COVID-19 AMP PRB: CPT | Performed by: NURSE PRACTITIONER

## 2020-05-03 PROCEDURE — 83735 ASSAY OF MAGNESIUM: CPT | Performed by: PHYSICIAN ASSISTANT

## 2020-05-03 PROCEDURE — B2111ZZ FLUOROSCOPY OF MULTIPLE CORONARY ARTERIES USING LOW OSMOLAR CONTRAST: ICD-10-PCS | Performed by: INTERNAL MEDICINE

## 2020-05-03 PROCEDURE — C1894 INTRO/SHEATH, NON-LASER: HCPCS | Performed by: INTERNAL MEDICINE

## 2020-05-03 PROCEDURE — 85576 BLOOD PLATELET AGGREGATION: CPT | Performed by: NURSE PRACTITIONER

## 2020-05-03 PROCEDURE — 85730 THROMBOPLASTIN TIME PARTIAL: CPT | Performed by: INTERNAL MEDICINE

## 2020-05-03 PROCEDURE — 86900 BLOOD TYPING SEROLOGIC ABO: CPT | Performed by: NURSE PRACTITIONER

## 2020-05-03 PROCEDURE — 85025 COMPLETE CBC W/AUTO DIFF WBC: CPT | Performed by: PHYSICIAN ASSISTANT

## 2020-05-03 PROCEDURE — 86901 BLOOD TYPING SEROLOGIC RH(D): CPT | Performed by: NURSE PRACTITIONER

## 2020-05-03 PROCEDURE — 85610 PROTHROMBIN TIME: CPT | Performed by: INTERNAL MEDICINE

## 2020-05-03 PROCEDURE — 25010000002 ONDANSETRON PER 1 MG: Performed by: INTERNAL MEDICINE

## 2020-05-03 PROCEDURE — 93880 EXTRACRANIAL BILAT STUDY: CPT

## 2020-05-03 PROCEDURE — B2151ZZ FLUOROSCOPY OF LEFT HEART USING LOW OSMOLAR CONTRAST: ICD-10-PCS | Performed by: INTERNAL MEDICINE

## 2020-05-03 PROCEDURE — 99232 SBSQ HOSP IP/OBS MODERATE 35: CPT | Performed by: INTERNAL MEDICINE

## 2020-05-03 PROCEDURE — 25010000002 MIDAZOLAM PER 1 MG: Performed by: INTERNAL MEDICINE

## 2020-05-03 PROCEDURE — 86850 RBC ANTIBODY SCREEN: CPT | Performed by: NURSE PRACTITIONER

## 2020-05-03 PROCEDURE — 80051 ELECTROLYTE PANEL: CPT

## 2020-05-03 PROCEDURE — 83605 ASSAY OF LACTIC ACID: CPT

## 2020-05-03 PROCEDURE — 93970 EXTREMITY STUDY: CPT

## 2020-05-03 PROCEDURE — 25010000002 FENTANYL CITRATE (PF) 100 MCG/2ML SOLUTION: Performed by: INTERNAL MEDICINE

## 2020-05-03 PROCEDURE — 84484 ASSAY OF TROPONIN QUANT: CPT | Performed by: PHYSICIAN ASSISTANT

## 2020-05-03 PROCEDURE — 85018 HEMOGLOBIN: CPT

## 2020-05-03 PROCEDURE — 36600 WITHDRAWAL OF ARTERIAL BLOOD: CPT

## 2020-05-03 PROCEDURE — 86923 COMPATIBILITY TEST ELECTRIC: CPT

## 2020-05-03 PROCEDURE — 25010000002 HEPARIN (PORCINE) PER 1000 UNITS: Performed by: PHYSICIAN ASSISTANT

## 2020-05-03 PROCEDURE — C1769 GUIDE WIRE: HCPCS | Performed by: INTERNAL MEDICINE

## 2020-05-03 PROCEDURE — 99233 SBSQ HOSP IP/OBS HIGH 50: CPT | Performed by: INTERNAL MEDICINE

## 2020-05-03 PROCEDURE — 81001 URINALYSIS AUTO W/SCOPE: CPT | Performed by: NURSE PRACTITIONER

## 2020-05-03 PROCEDURE — 85007 BL SMEAR W/DIFF WBC COUNT: CPT | Performed by: PHYSICIAN ASSISTANT

## 2020-05-03 PROCEDURE — 87641 MR-STAPH DNA AMP PROBE: CPT | Performed by: NURSE PRACTITIONER

## 2020-05-03 PROCEDURE — 82330 ASSAY OF CALCIUM: CPT

## 2020-05-03 PROCEDURE — 85347 COAGULATION TIME ACTIVATED: CPT

## 2020-05-03 PROCEDURE — 86901 BLOOD TYPING SEROLOGIC RH(D): CPT

## 2020-05-03 PROCEDURE — 80053 COMPREHEN METABOLIC PANEL: CPT | Performed by: NURSE PRACTITIONER

## 2020-05-03 PROCEDURE — 25010000002 HYDRALAZINE PER 20 MG: Performed by: NURSE PRACTITIONER

## 2020-05-03 RX ORDER — ATROPINE SULFATE 1 MG/ML
.5-1 INJECTION, SOLUTION INTRAMUSCULAR; INTRAVENOUS; SUBCUTANEOUS
Status: DISCONTINUED | OUTPATIENT
Start: 2020-05-03 | End: 2020-05-04 | Stop reason: HOSPADM

## 2020-05-03 RX ORDER — ATROPINE SULFATE 0.1 MG/ML
INJECTION INTRAVENOUS
Status: DISPENSED
Start: 2020-05-03 | End: 2020-05-04

## 2020-05-03 RX ORDER — SODIUM CHLORIDE 9 MG/ML
250 INJECTION, SOLUTION INTRAVENOUS ONCE AS NEEDED
Status: COMPLETED | OUTPATIENT
Start: 2020-05-03 | End: 2020-05-04

## 2020-05-03 RX ORDER — ONDANSETRON 2 MG/ML
4 INJECTION INTRAMUSCULAR; INTRAVENOUS EVERY 6 HOURS PRN
Status: DISCONTINUED | OUTPATIENT
Start: 2020-05-03 | End: 2020-05-03 | Stop reason: SDUPTHER

## 2020-05-03 RX ORDER — ALPRAZOLAM 0.25 MG/1
0.25 TABLET ORAL EVERY 8 HOURS PRN
Status: DISCONTINUED | OUTPATIENT
Start: 2020-05-03 | End: 2020-05-04 | Stop reason: HOSPADM

## 2020-05-03 RX ORDER — ACETAMINOPHEN 325 MG/1
650 TABLET ORAL EVERY 4 HOURS PRN
Status: DISCONTINUED | OUTPATIENT
Start: 2020-05-03 | End: 2020-05-03 | Stop reason: SDUPTHER

## 2020-05-03 RX ORDER — FENTANYL CITRATE 50 UG/ML
INJECTION, SOLUTION INTRAMUSCULAR; INTRAVENOUS AS NEEDED
Status: DISCONTINUED | OUTPATIENT
Start: 2020-05-03 | End: 2020-05-03 | Stop reason: HOSPADM

## 2020-05-03 RX ORDER — HYDRALAZINE HYDROCHLORIDE 20 MG/ML
10 INJECTION INTRAMUSCULAR; INTRAVENOUS ONCE
Status: COMPLETED | OUTPATIENT
Start: 2020-05-03 | End: 2020-05-03

## 2020-05-03 RX ORDER — ONDANSETRON 4 MG/1
4 TABLET, FILM COATED ORAL EVERY 6 HOURS PRN
Status: DISCONTINUED | OUTPATIENT
Start: 2020-05-03 | End: 2020-05-03 | Stop reason: SDUPTHER

## 2020-05-03 RX ORDER — CHLORHEXIDINE GLUCONATE 0.12 MG/ML
15 RINSE ORAL EVERY 12 HOURS SCHEDULED
Status: COMPLETED | OUTPATIENT
Start: 2020-05-03 | End: 2020-05-04

## 2020-05-03 RX ORDER — CHLORHEXIDINE GLUCONATE 500 MG/1
1 CLOTH TOPICAL EVERY 12 HOURS
Status: COMPLETED | OUTPATIENT
Start: 2020-05-03 | End: 2020-05-04

## 2020-05-03 RX ORDER — NITROGLYCERIN 5 MG/ML
INJECTION, SOLUTION INTRAVENOUS AS NEEDED
Status: DISCONTINUED | OUTPATIENT
Start: 2020-05-03 | End: 2020-05-03 | Stop reason: HOSPADM

## 2020-05-03 RX ORDER — LIDOCAINE HYDROCHLORIDE 20 MG/ML
INJECTION, SOLUTION INFILTRATION; PERINEURAL AS NEEDED
Status: DISCONTINUED | OUTPATIENT
Start: 2020-05-03 | End: 2020-05-03 | Stop reason: HOSPADM

## 2020-05-03 RX ORDER — MIDAZOLAM HYDROCHLORIDE 1 MG/ML
INJECTION INTRAMUSCULAR; INTRAVENOUS AS NEEDED
Status: DISCONTINUED | OUTPATIENT
Start: 2020-05-03 | End: 2020-05-03 | Stop reason: HOSPADM

## 2020-05-03 RX ORDER — CHLORHEXIDINE GLUCONATE 0.12 MG/ML
15 RINSE ORAL EVERY 12 HOURS SCHEDULED
Status: DISCONTINUED | OUTPATIENT
Start: 2020-05-03 | End: 2020-05-03

## 2020-05-03 RX ADMIN — ASPIRIN 81 MG: 81 TABLET, COATED ORAL at 08:09

## 2020-05-03 RX ADMIN — CHLORHEXIDINE GLUCONATE 1 APPLICATION: 500 CLOTH TOPICAL at 22:30

## 2020-05-03 RX ADMIN — MUPIROCIN 1 APPLICATION: 20 OINTMENT TOPICAL at 22:49

## 2020-05-03 RX ADMIN — CHLORHEXIDINE GLUCONATE 0.12% ORAL RINSE 15 ML: 1.2 LIQUID ORAL at 22:49

## 2020-05-03 RX ADMIN — ATORVASTATIN CALCIUM 40 MG: 40 TABLET, FILM COATED ORAL at 22:48

## 2020-05-03 RX ADMIN — LISINOPRIL 20 MG: 20 TABLET ORAL at 08:09

## 2020-05-03 RX ADMIN — METOPROLOL TARTRATE 25 MG: 25 TABLET, FILM COATED ORAL at 22:48

## 2020-05-03 RX ADMIN — HEPARIN SODIUM AND DEXTROSE 11.39 UNITS/KG/HR: 10000; 5 INJECTION INTRAVENOUS at 06:17

## 2020-05-03 RX ADMIN — LISINOPRIL 20 MG: 20 TABLET ORAL at 22:49

## 2020-05-03 RX ADMIN — ONDANSETRON 4 MG: 2 INJECTION INTRAMUSCULAR; INTRAVENOUS at 11:33

## 2020-05-03 RX ADMIN — Medication 10 ML: at 22:53

## 2020-05-03 RX ADMIN — METOPROLOL TARTRATE 25 MG: 25 TABLET, FILM COATED ORAL at 08:09

## 2020-05-03 RX ADMIN — HYDRALAZINE HYDROCHLORIDE 10 MG: 20 INJECTION INTRAMUSCULAR; INTRAVENOUS at 12:00

## 2020-05-03 NOTE — PLAN OF CARE
"Max temperature 98.2. Max blood pressure 177/100. Patient is highly anxious and reports having \"white coat syndrome\" which affects his blood pressure. Oxygen sats 96-97% on room air. Denied any chest pain or pressure this shift. Patient has Heparin and NTG drip continued this shift. Rates adjusted per protocol. Monitor shows SR/SB. Heart rate decreased to 46 during night. NPO at midnight for heart catheterization scheduled today. Patient refused Melatonin at HS. Also refused to have physician called for anxiety medication this shift. Will continue to monitor closely.  "

## 2020-05-03 NOTE — NURSING NOTE
[id number] No call back for report    Tried to call the nurse to give report on the patient, had a heart cath today and was transferred to [facility] for CABG. Patient will be going to room 2203 and ext. [id number] was called and was told it would be Brown taking the patient. Unable to take the call at this time due to MD in the room and the patient had just arrived to the floor. Advised them to have his new nurse give me a call back for report when able to

## 2020-05-03 NOTE — PROGRESS NOTES
[facility] Medicine Services Daily Progress Note      Hospitalist Team  LOS 0 days      Patient Care Team:  Provider, No Known as PCP - General    Patient Location: 2203/1      Subjective   Subjective     Chief Complaint / Subjective  Chief Complaint   Patient presents with   • Chest Pain         Brief Synopsis of Hospital Course/HPI    70-year-old man with medical history significant for hypertension and hyperlipidemia.  Presented to the emergency room with complaints of chest pain. Stated chest pain is of mild to moderate in intensity, pressure-like in nature, midsternal and nonradiating.  No aggravating or relieving factor.  Has had the pain off and on for several days.     Patient related recent lifestyle modification-jogging and dieting with weight loss of approximately 10 pounds.  Self discontinued his blood pressure medication and statin.     On presentation to the emergency room, initial troponin was 0.398.  EKG showed no acute ST/T wave changes and chest x-ray showed no acute cardiopulmonary abnormality.  Patient was started on heparin and admitted for further care.             Date::    5/3  Patient seen and examined postprocedure  Had cardiac catheterization 5/3-showed multivessel coronary artery disease-significant obstructive CAD involving RCA, left circumflex artery- ramus branch and also Diagonal branch.  Has normal LV systolic function with EF 55 to 60% and normal wall motions.  Cardiologist reviewing cath films with the surgical team in consideration of CABG versus two-vessel stenting  Patient denies any chest pain or shortness of breath at this time    Review of Systems   Constitution: Negative for chills and fever.   HENT: Negative for congestion.    Eyes: Negative for blurred vision.   Cardiovascular: Negative for chest pain.   Respiratory: Negative for shortness of breath.    Endocrine: Negative for cold intolerance and heat intolerance.   Gastrointestinal: Negative for  "abdominal pain, nausea and vomiting.   Genitourinary: Negative for dysuria.   Neurological: Negative for light-headedness.   Psychiatric/Behavioral: Negative for altered mental status.         Objective   Objective      Vital Signs  Temp:  [98.1 °F (36.7 °C)-98.4 °F (36.9 °C)] 98.1 °F (36.7 °C)  Heart Rate:  [44-80] 60  Resp:  [14-22] 14  BP: (100-204)/() 150/87  Oxygen Therapy  SpO2: 97 %  Pulse Oximetry Type: Intermittent  Device (Oxygen Therapy): room air  Device (Oxygen Therapy): room air  Flowsheet Rows      First Filed Value   Admission Height  188 cm (74\") Documented at 05/02/2020 1403   Admission Weight  95.7 kg (210 lb 15.7 oz) Documented at 05/02/2020 1403        Intake & Output (last 3 days)       04/30 0701 - 05/01 0700 05/01 0701 - 05/02 0700 05/02 0701 - 05/03 0700 05/03 0701 - 05/04 0700    P.O.   480     Total Intake(mL/kg)   480 (5.1)     Urine (mL/kg/hr)   660     Total Output   660     Net   -180                 Lines, Drains & Airways    Active LDAs     Name:   Placement date:   Placement time:   Site:   Days:    Peripheral IV 05/02/20 1428 Right Antecubital   05/02/20 1428    Antecubital   less than 1                  Physical Exam:    Physical Exam   Constitutional: He is oriented to person, place, and time. He appears well-developed. No distress.   HENT:   Head: Normocephalic.   Eyes: Pupils are equal, round, and reactive to light. EOM are normal.   Neck: Neck supple.   Cardiovascular: Normal rate, regular rhythm and normal heart sounds.   Pulmonary/Chest: Effort normal and breath sounds normal. No respiratory distress.   Abdominal: Soft. Bowel sounds are normal. There is no tenderness.   Musculoskeletal: He exhibits no edema.   Neurological: He is alert and oriented to person, place, and time. No cranial nerve deficit.   Skin: Skin is warm and dry.   Right groin with no hematoma   Psychiatric: He has a normal mood and affect.   Vitals " reviewed.            Procedures:    Procedure(s):  LEFT HEART CATH with possible PCI          Results Review:     I reviewed the patient's new clinical results.      Lab Results (last 24 hours)     Procedure Component Value Units Date/Time    CBC Auto Differential [[id number]]  (Normal) Collected:  05/03/20 0548    Specimen:  Blood Updated:  05/03/20 0736     WBC 6.90 10*3/mm3      RBC 4.49 10*6/mm3      Hemoglobin 14.3 g/dL      Hematocrit 41.3 %      MCV 92.0 fL      MCH 31.8 pg      MCHC 34.5 g/dL      RDW 14.0 %      RDW-SD 45.5 fl      MPV 8.8 fL      Platelets 237 10*3/mm3      Comment: Result checked         Neutrophil % 56.6 %      Lymphocyte % 29.6 %      Monocyte % 8.5 %      Eosinophil % 4.4 %      Basophil % 0.9 %      Neutrophils, Absolute 3.90 10*3/mm3      Lymphocytes, Absolute 2.00 10*3/mm3      Monocytes, Absolute 0.60 10*3/mm3      Eosinophils, Absolute 0.30 10*3/mm3      Basophils, Absolute 0.10 10*3/mm3      nRBC 0.2 /100 WBC     Narrative:       Appended report. These results have been appended to a previously verified report.    Scan Slide [[id number]] Collected:  05/03/20 0548    Specimen:  Blood Updated:  05/03/20 0736     RBC Morphology Normal     WBC Morphology Normal     Giant Platelets Slight/1+    Troponin [[id number]]  (Abnormal) Collected:  05/03/20 0548    Specimen:  Blood Updated:  05/03/20 0635     Troponin T 0.400 ng/mL     Narrative:       Troponin T Reference Range:  <= 0.03 ng/mL-   Negative for AMI  >0.03 ng/mL-     Abnormal for myocardial necrosis.  Clinicians would have to utilize clinical acumen, EKG, Troponin and serial changes to determine if it is an Acute Myocardial Infarction or myocardial injury due to an underlying chronic condition.       Results may be falsely decreased if patient taking Biotin.      Basic Metabolic Panel [[id number]]  (Abnormal) Collected:  05/03/20 0548    Specimen:  Blood Updated:  05/03/20 0630     Glucose 118 mg/dL      BUN 14 mg/dL      Creatinine  1.01 mg/dL      Sodium 142 mmol/L      Potassium 4.7 mmol/L      Chloride 106 mmol/L      CO2 27.0 mmol/L      Calcium 8.6 mg/dL      eGFR Non African Amer 73 mL/min/1.73      BUN/Creatinine Ratio 13.9     Anion Gap 9.0 mmol/L     Narrative:       GFR Normal >60  Chronic Kidney Disease <60  Kidney Failure <15      Magnesium [[id number]]  (Normal) Collected:  05/03/20 0548    Specimen:  Blood Updated:  05/03/20 0630     Magnesium 2.2 mg/dL     aPTT [[id number]]  (Abnormal) Collected:  05/03/20 0548    Specimen:  Blood Updated:  05/03/20 0621     PTT 42.3 seconds     Protime-INR [[id number]]  (Normal) Collected:  05/03/20 0548    Specimen:  Blood Updated:  05/03/20 0621     Protime 11.0 Seconds      INR 1.06    Troponin [[id number]]  (Abnormal) Collected:  05/02/20 2206    Specimen:  Blood Updated:  05/02/20 2251     Troponin T 0.501 ng/mL     Narrative:       Troponin T Reference Range:  <= 0.03 ng/mL-   Negative for AMI  >0.03 ng/mL-     Abnormal for myocardial necrosis.  Clinicians would have to utilize clinical acumen, EKG, Troponin and serial changes to determine if it is an Acute Myocardial Infarction or myocardial injury due to an underlying chronic condition.       Results may be falsely decreased if patient taking Biotin.      aPTT [[id number]]  (Abnormal) Collected:  05/02/20 2206    Specimen:  Blood Updated:  05/02/20 2240     PTT 37.6 seconds     aPTT [[id number]]  (Abnormal) Collected:  05/02/20 1442    Specimen:  Blood Updated:  05/02/20 1548     PTT 25.2 seconds     Protime-INR [[id number]]  (Normal) Collected:  05/02/20 1442    Specimen:  Blood Updated:  05/02/20 1548     Protime 10.5 Seconds      INR 1.00    Extra Tubes [[id number]] Collected:  05/02/20 1442    Specimen:  Blood Updated:  05/02/20 1545    Narrative:       The following orders were created for panel order Extra Tubes.  Procedure                               Abnormality         Status                     ---------                                -----------         ------                     Light Blue Top[[id number]]                                   Final result                 Please view results for these tests on the individual orders.    Light Blue Top [[id number]] Collected:  05/02/20 1442    Specimen:  Blood Updated:  05/02/20 1545     Extra Tube hold for add-on     Comment: Auto resulted       Troponin [[id number]]  (Abnormal) Collected:  05/02/20 1442    Specimen:  Blood Updated:  05/02/20 1514     Troponin T 0.398 ng/mL     Narrative:       Troponin T Reference Range:  <= 0.03 ng/mL-   Negative for AMI  >0.03 ng/mL-     Abnormal for myocardial necrosis.  Clinicians would have to utilize clinical acumen, EKG, Troponin and serial changes to determine if it is an Acute Myocardial Infarction or myocardial injury due to an underlying chronic condition.       Results may be falsely decreased if patient taking Biotin.      Basic Metabolic Panel [[id number]]  (Abnormal) Collected:  05/02/20 1442    Specimen:  Blood Updated:  05/02/20 1508     Glucose 108 mg/dL      BUN 17 mg/dL      Creatinine 1.01 mg/dL      Sodium 142 mmol/L      Potassium 4.0 mmol/L      Chloride 104 mmol/L      CO2 26.0 mmol/L      Calcium 8.9 mg/dL      eGFR Non African Amer 73 mL/min/1.73      BUN/Creatinine Ratio 16.8     Anion Gap 12.0 mmol/L     Narrative:       GFR Normal >60  Chronic Kidney Disease <60  Kidney Failure <15      CBC & Differential [[id number]] Collected:  05/02/20 1442    Specimen:  Blood Updated:  05/02/20 1449    Narrative:       The following orders were created for panel order CBC & Differential.  Procedure                               Abnormality         Status                     ---------                               -----------         ------                     CBC Auto Differential[[id number]]        Normal              Final result                 Please view results for these tests on the individual orders.    CBC Auto Differential [[id number]]  (Normal)  Collected:  05/02/20 1442    Specimen:  Blood Updated:  05/02/20 1449     WBC 6.80 10*3/mm3      RBC 4.97 10*6/mm3      Hemoglobin 15.8 g/dL      Hematocrit 46.3 %      MCV 93.2 fL      MCH 31.7 pg      MCHC 34.1 g/dL      RDW 14.1 %      RDW-SD 45.5 fl      MPV 8.7 fL      Platelets 269 10*3/mm3      Neutrophil % 60.6 %      Lymphocyte % 25.0 %      Monocyte % 9.8 %      Eosinophil % 3.2 %      Basophil % 1.4 %      Neutrophils, Absolute 4.10 10*3/mm3      Lymphocytes, Absolute 1.70 10*3/mm3      Monocytes, Absolute 0.70 10*3/mm3      Eosinophils, Absolute 0.20 10*3/mm3      Basophils, Absolute 0.10 10*3/mm3      nRBC 0.1 /100 WBC         No results found for: HGBA1C  Results from last 7 days   Lab Units 05/03/20  0548 05/02/20  1442   INR  1.06 1.00           No results found for: LIPASE  No results found for: CHOL, CHLPL, TRIG, HDL, LDL, LDLDIRECT    No results found for: INTRAOP, PREDX, FINALDX, COMDX    Microbiology Results (last 10 days)     ** No results found for the last 240 hours. **          ECG/EMG Results (most recent)     Procedure Component Value Units Date/Time    ECG 12 Lead [[id number]] Collected:  05/02/20 1412     Updated:  05/02/20 1415    Narrative:       HEART RATE= 69  bpm  RR Interval= 868  ms  AL Interval= 210  ms  P Horizontal Axis= -8  deg  P Front Axis= 43  deg  QRSD Interval= 76  ms  QT Interval= 382  ms  QRS Axis= 9  deg  T Wave Axis= 25  deg  - NORMAL ECG -  Sinus rhythm  No previous ECG available for comparison  Electronically Signed By:   Date and Time of Study: 2020-05-02 14:12:41                    Xr Chest 1 View    Result Date: 5/2/2020  No active cardiopulmonary disease.   Electronically Signed By-Albino Mckinley DO. On:5/2/2020 3:02 PM This report was finalized on [id number] by  Albino Mckinley DO..          Xrays, labs reviewed personally by physician.    Medication Review:   I have reviewed the patient's current medication list      Scheduled Meds    [MAR Hold] aspirin 81  mg Oral Daily   [MAR Hold] atorvastatin 40 mg Oral Nightly   hydrALAZINE 10 mg Intravenous Once   lisinopril 20 mg Oral BID   metoprolol tartrate 25 mg Oral Q12H   sodium chloride 10 mL Intravenous Q12H       Meds Infusions    heparin 10.4 Units/kg/hr Last Rate: Stopped (05/03/20 0920)   nitroglycerin 5-200 mcg/min Last Rate: 5 mcg/min (05/03/20 0918)       Meds PRN  •  acetaminophen **OR** acetaminophen **OR** acetaminophen  •  aluminum-magnesium hydroxide-simethicone  •  atropine  •  bisacodyl  •  [MAR Hold] heparin  •  [MAR Hold] heparin  •  magnesium hydroxide  •  [MAR Hold] magnesium sulfate **OR** [MAR Hold] magnesium sulfate in D5W 1g/100mL (PREMIX)  •  melatonin  •  nitroglycerin  •  ondansetron **OR** ondansetron  •  [MAR Hold] potassium chloride  •  [COMPLETED] Insert peripheral IV **AND** [MAR Hold] sodium chloride  •  sodium chloride  •  sodium chloride  •  [MAR Hold] valACYclovir    I personally reviewed patient's x-ray films   I personally reviewed patient's EKG strips     Assessment/Plan   Assessment/Plan     Active Hospital Problems    Diagnosis  POA   • **Subendocardial MI first episode care (CMS/[facility]) [I21.4]  Yes   • Essential hypertension [I10]  Yes   • Dyslipidemia [E78.5]  Yes      Resolved Hospital Problems   No resolved problems to display.       MEDICAL DECISION MAKING COMPLEXITY BY PROBLEM:       Recurrent chest pain-NSTEMI  Had cardiac catheterization 5/3-showed multivessel coronary artery disease-significant obstructive CAD involving RCA, left circumflex artery- ramus branch and also Diagonal branch.  Has normal LV systolic function with EF 55 to 60% and normal wall motions.  Cardiologist reviewing cath films with the surgical team in consideration of CABG versus two-vessel stenting  On aspirin, statin and beta-blocker  Plan is to resume heparin drip 6 hours after sheath is pulled    Hyperlipidemia-on statin    Essential hypertension  Blood pressure fairly controlled  On lisinopril and  metoprolol  For blood pressure     VTE Prophylaxis - heparin drip    Mechanical Order History:      Ordered        05/02/20 1643  Place Sequential Compression Device  Once         05/02/20 1643  Maintain Sequential Compression Device  Continuous                 Pharmalogical Order History:     Ordered     Dose Route Frequency Stop    05/02/20 1536  heparin bolus from bag 5,000 Units      5,000 Units IV Once 05/02/20 1555    05/02/20 1536  heparin 80879 units/250 ml (100 units/ml) in D5W  9.95 mL/hr      10.4 Units/kg/hr IV Titrated --    05/02/20 1536  [MAR Hold]  heparin bolus from bag 2,500 Units     (MAR Hold since Sun 5/3/2020 at 0945. Reason: Unreviewed Transfer Orders.)    2,500 Units IV Every 6 Hours PRN --    05/02/20 1536  [MAR Hold]  heparin bolus from bag 5,000 Units     (MAR Hold since Sun 5/3/2020 at 0945. Reason: Unreviewed Transfer Orders.)    5,000 Units IV Every 6 Hours PRN --    05/02/20 1530  heparin bolus from bag 5,000 Units  Status:  Discontinued      5,000 Units IV Once 05/02/20 1536    05/02/20 1530  heparin 68319 units/250 ml (100 units/ml) in D5W  9.95 mL/hr,   Status:  Discontinued      10.4 Units/kg/hr IV Titrated 05/02/20 1536            Code Status -   Code Status and Medical Interventions:   Ordered at: 05/02/20 1643     Code Status:    CPR     Medical Interventions (Level of Support Prior to Arrest):    Full             Discharge Planning          Destination      Coordination has not been started for this encounter.      Durable Medical Equipment      Coordination has not been started for this encounter.      Dialysis/Infusion      Coordination has not been started for this encounter.      Home Medical Care      Coordination has not been started for this encounter.      Therapy      Coordination has not been started for this encounter.      Community Resources      Coordination has not been started for this encounter.            Electronically signed by Riaz Gutierrez MD, 05/03/20,  11:54.  [facility] Hospitalist Team

## 2020-05-03 NOTE — CONSULTS
Patient Care Team:  Provider, No Known as PCP - General  Referring Provider:  Dr. Hall  Reason for consultation:  MV CAD, NSTEMI    Chief complaint:  Midsternal chest pressure    Subjective     History of Present Illness:  71 y/o  gentleman presented to ED with c/o intermittent midsternal chest pressure for last couple weeks.  He had lost some weight and stopped his metoprolol and statin medications on his own.  He first noted chest pain after his car broke down and he tried to jog that was associated with SOA.  He denied any radiation of pain, n/v, lightheadedness.  He has noticed fatigue.  Exertion was exacerbating factor.  Alleviating factors--he tried Tylenol and he feels it worked after an hour or so.  He ruled in for NSTEMI.  Cardiology was consulted and he was taken to cath lab this morning and found to have MV CAD with EF 55-60%.   Dr. Ferris was consulted for surgical evaluation.    Review of Systems   Constitutional: Positive for fatigue.   HENT: Negative for postnasal drip and rhinorrhea.    Respiratory: Positive for shortness of breath. Negative for cough.    Cardiovascular: Positive for chest pain. Negative for palpitations and leg swelling.   Gastrointestinal: Negative for abdominal pain, constipation, diarrhea, nausea and vomiting.   Musculoskeletal: Positive for arthralgias.   Skin: Positive for rash.   Allergic/Immunologic: Negative for immunocompromised state.   Neurological: Negative for dizziness, seizures, syncope, weakness, light-headedness, numbness and headaches.   Psychiatric/Behavioral: The patient is not nervous/anxious.         Past Medical History:   Diagnosis Date   • Hyperlipidemia    • Hypertension      Past Surgical History:   Procedure Laterality Date   • FINGER SURGERY     • UVULECTOMY       Family History   Problem Relation Age of Onset   • Hypertension Mother    • Hypertension Father    • Atrial fibrillation Sister    • Hypertension Brother      Social History      "    Tobacco Use   • Smoking status: Never Smoker   • Smokeless tobacco: Never Used   Substance Use Topics   • Alcohol use: Yes     Comment: occasional- 1 drink every other month   • Drug use: Never     Medications Prior to Admission   Medication Sig Dispense Refill Last Dose   • meloxicam (MOBIC) 15 MG tablet Take 15 mg by mouth Daily.   Past Month at Unknown time   • lisinopril (PRINIVIL,ZESTRIL) 10 MG tablet Take 20 mg by mouth 2 (Two) Times a Day.   5/2/2020 at 0900   • metoprolol tartrate (LOPRESSOR) 25 MG tablet Take 25 mg by mouth 2 (Two) Times a Day.   4/25/2020 at Unknown time   • NON FORMULARY Daily. Cholesterol med. Last dose was over 3 weeks ago.   More than a month at Unknown time   • valACYclovir (VALTREX) 500 MG tablet Take 1,000 mg by mouth 2 (Two) Times a Day As Needed (for cold sores).   More than a month at Unknown time       aspirin 81 mg Oral Daily   atorvastatin 40 mg Oral Nightly   Atropine Sulfate      [START ON 5/4/2020] ceFAZolin 2 g Intravenous Once   chlorhexidine 15 mL Mouth/Throat Q12H   Chlorhexidine Gluconate Cloth 1 application Topical Q12H   lisinopril 20 mg Oral BID   metoprolol tartrate 25 mg Oral Q12H   mupirocin 1 application Each Nare Q12H   sodium chloride 10 mL Intravenous Q12H     Allergies:  Neosporin [bacitracin-polymyxin b]    Objective      Vital Signs  Temp:  [98 °F (36.7 °C)-98.4 °F (36.9 °C)] 98 °F (36.7 °C)  Heart Rate:  [44-80] 53  Resp:  [14-22] 16  BP: (100-204)/() 185/88    Flowsheet Rows      First Filed Value   Admission Height  188 cm (74\") Documented at 05/02/2020 1403   Admission Weight  95.7 kg (210 lb 15.7 oz) Documented at 05/02/2020 1403        188 cm (74.02\")    Physical Exam   Constitutional: He is oriented to person, place, and time. Vital signs are normal. He appears well-developed and well-nourished. He is active and cooperative.   Pt seen in [facility]   HENT:   Head: Normocephalic and atraumatic.   Nose: Nose normal.   Mouth/Throat: Uvula is " midline, oropharynx is clear and moist and mucous membranes are normal.   Eyes: Pupils are equal, round, and reactive to light. Conjunctivae, EOM and lids are normal. No scleral icterus.   Neck: Trachea normal. Neck supple. Normal carotid pulses, no hepatojugular reflux and no JVD present. Carotid bruit is not present. No thyroid mass and no thyromegaly present.   Cardiovascular: Regular rhythm, normal heart sounds and intact distal pulses. Bradycardia present.   No murmur heard.  Pulses:       Carotid pulses are 2+ on the right side, and 2+ on the left side.       Radial pulses are 2+ on the right side, and 2+ on the left side.        Femoral pulses are 2+ on the right side, and 2+ on the left side.       Popliteal pulses are 2+ on the right side, and 2+ on the left side.        Dorsalis pedis pulses are 2+ on the right side, and 2+ on the left side.        Posterior tibial pulses are 2+ on the right side, and 2+ on the left side.   Tele:  SB 50s.  NTG at 50 mcg.   Pulmonary/Chest: Effort normal and breath sounds normal.   On room air   Abdominal: Soft. Normal appearance, normal aorta and bowel sounds are normal. He exhibits no distension and no abdominal bruit. There is no hepatosplenomegaly. There is no tenderness.   Musculoskeletal:   Gait steady and strong without use of assistive devices   Lymphadenopathy:     He has no cervical adenopathy.        Right: No supraclavicular adenopathy present.        Left: No supraclavicular adenopathy present.   Neurological: He is alert and oriented to person, place, and time. GCS eye subscore is 4. GCS verbal subscore is 5. GCS motor subscore is 6.   Skin: Skin is warm, dry and intact. Capillary refill takes less than 2 seconds. Rash noted. Rash is urticarial. No cyanosis or erythema. Nails show no clubbing.        Psychiatric: He has a normal mood and affect. His speech is normal and behavior is normal. Judgment and thought content normal. Cognition and memory are normal.    Nursing note and vitals reviewed.      Results Review:   Lab Results (last 24 hours)     Procedure Component Value Units Date/Time    aPTT [[id number]] Collected:  05/03/20 1217    Specimen:  Blood, Arterial Line Updated:  05/03/20 1235    CBC Auto Differential [[id number]]  (Normal) Collected:  05/03/20 0548    Specimen:  Blood Updated:  05/03/20 0736     WBC 6.90 10*3/mm3      RBC 4.49 10*6/mm3      Hemoglobin 14.3 g/dL      Hematocrit 41.3 %      MCV 92.0 fL      MCH 31.8 pg      MCHC 34.5 g/dL      RDW 14.0 %      RDW-SD 45.5 fl      MPV 8.8 fL      Platelets 237 10*3/mm3      Comment: Result checked         Neutrophil % 56.6 %      Lymphocyte % 29.6 %      Monocyte % 8.5 %      Eosinophil % 4.4 %      Basophil % 0.9 %      Neutrophils, Absolute 3.90 10*3/mm3      Lymphocytes, Absolute 2.00 10*3/mm3      Monocytes, Absolute 0.60 10*3/mm3      Eosinophils, Absolute 0.30 10*3/mm3      Basophils, Absolute 0.10 10*3/mm3      nRBC 0.2 /100 WBC     Narrative:       Appended report. These results have been appended to a previously verified report.    Scan Slide [[id number]] Collected:  05/03/20 0548    Specimen:  Blood Updated:  05/03/20 0736     RBC Morphology Normal     WBC Morphology Normal     Giant Platelets Slight/1+    Troponin [[id number]]  (Abnormal) Collected:  05/03/20 0548    Specimen:  Blood Updated:  05/03/20 0635     Troponin T 0.400 ng/mL     Narrative:       Troponin T Reference Range:  <= 0.03 ng/mL-   Negative for AMI  >0.03 ng/mL-     Abnormal for myocardial necrosis.  Clinicians would have to utilize clinical acumen, EKG, Troponin and serial changes to determine if it is an Acute Myocardial Infarction or myocardial injury due to an underlying chronic condition.       Results may be falsely decreased if patient taking Biotin.      Basic Metabolic Panel [[id number]]  (Abnormal) Collected:  05/03/20 0548    Specimen:  Blood Updated:  05/03/20 0630     Glucose 118 mg/dL      BUN 14 mg/dL      Creatinine  1.01 mg/dL      Sodium 142 mmol/L      Potassium 4.7 mmol/L      Chloride 106 mmol/L      CO2 27.0 mmol/L      Calcium 8.6 mg/dL      eGFR Non African Amer 73 mL/min/1.73      BUN/Creatinine Ratio 13.9     Anion Gap 9.0 mmol/L     Narrative:       GFR Normal >60  Chronic Kidney Disease <60  Kidney Failure <15      Magnesium [[id number]]  (Normal) Collected:  05/03/20 0548    Specimen:  Blood Updated:  05/03/20 0630     Magnesium 2.2 mg/dL     aPTT [[id number]]  (Abnormal) Collected:  05/03/20 0548    Specimen:  Blood Updated:  05/03/20 0621     PTT 42.3 seconds     Protime-INR [[id number]]  (Normal) Collected:  05/03/20 0548    Specimen:  Blood Updated:  05/03/20 0621     Protime 11.0 Seconds      INR 1.06    Troponin [[id number]]  (Abnormal) Collected:  05/02/20 2206    Specimen:  Blood Updated:  05/02/20 2251     Troponin T 0.501 ng/mL     Narrative:       Troponin T Reference Range:  <= 0.03 ng/mL-   Negative for AMI  >0.03 ng/mL-     Abnormal for myocardial necrosis.  Clinicians would have to utilize clinical acumen, EKG, Troponin and serial changes to determine if it is an Acute Myocardial Infarction or myocardial injury due to an underlying chronic condition.       Results may be falsely decreased if patient taking Biotin.      aPTT [[id number]]  (Abnormal) Collected:  05/02/20 2206    Specimen:  Blood Updated:  05/02/20 2240     PTT 37.6 seconds     aPTT [[id number]]  (Abnormal) Collected:  05/02/20 1442    Specimen:  Blood Updated:  05/02/20 1548     PTT 25.2 seconds     Protime-INR [[id number]]  (Normal) Collected:  05/02/20 1442    Specimen:  Blood Updated:  05/02/20 1548     Protime 10.5 Seconds      INR 1.00    Extra Tubes [[id number]] Collected:  05/02/20 1442    Specimen:  Blood Updated:  05/02/20 1545    Narrative:       The following orders were created for panel order Extra Tubes.  Procedure                               Abnormality         Status                     ---------                                -----------         ------                     Light Blue Top[[id number]]                                   Final result                 Please view results for these tests on the individual orders.    Light Blue Top [[id number]] Collected:  05/02/20 1442    Specimen:  Blood Updated:  05/02/20 1545     Extra Tube hold for add-on     Comment: Auto resulted       Troponin [[id number]]  (Abnormal) Collected:  05/02/20 1442    Specimen:  Blood Updated:  05/02/20 1514     Troponin T 0.398 ng/mL     Narrative:       Troponin T Reference Range:  <= 0.03 ng/mL-   Negative for AMI  >0.03 ng/mL-     Abnormal for myocardial necrosis.  Clinicians would have to utilize clinical acumen, EKG, Troponin and serial changes to determine if it is an Acute Myocardial Infarction or myocardial injury due to an underlying chronic condition.       Results may be falsely decreased if patient taking Biotin.      Basic Metabolic Panel [[id number]]  (Abnormal) Collected:  05/02/20 1442    Specimen:  Blood Updated:  05/02/20 1508     Glucose 108 mg/dL      BUN 17 mg/dL      Creatinine 1.01 mg/dL      Sodium 142 mmol/L      Potassium 4.0 mmol/L      Chloride 104 mmol/L      CO2 26.0 mmol/L      Calcium 8.9 mg/dL      eGFR Non African Amer 73 mL/min/1.73      BUN/Creatinine Ratio 16.8     Anion Gap 12.0 mmol/L     Narrative:       GFR Normal >60  Chronic Kidney Disease <60  Kidney Failure <15      CBC & Differential [[id number]] Collected:  05/02/20 1442    Specimen:  Blood Updated:  05/02/20 1449    Narrative:       The following orders were created for panel order CBC & Differential.  Procedure                               Abnormality         Status                     ---------                               -----------         ------                     CBC Auto Differential[[id number]]        Normal              Final result                 Please view results for these tests on the individual orders.    CBC Auto Differential [[id number]]  (Normal)  Collected:  05/02/20 1442    Specimen:  Blood Updated:  05/02/20 1449     WBC 6.80 10*3/mm3      RBC 4.97 10*6/mm3      Hemoglobin 15.8 g/dL      Hematocrit 46.3 %      MCV 93.2 fL      MCH 31.7 pg      MCHC 34.1 g/dL      RDW 14.1 %      RDW-SD 45.5 fl      MPV 8.7 fL      Platelets 269 10*3/mm3      Neutrophil % 60.6 %      Lymphocyte % 25.0 %      Monocyte % 9.8 %      Eosinophil % 3.2 %      Basophil % 1.4 %      Neutrophils, Absolute 4.10 10*3/mm3      Lymphocytes, Absolute 1.70 10*3/mm3      Monocytes, Absolute 0.70 10*3/mm3      Eosinophils, Absolute 0.20 10*3/mm3      Basophils, Absolute 0.10 10*3/mm3      nRBC 0.1 /100 WBC               Assessment/Plan       Subendocardial MI first episode care (CMS/[facility])    Essential hypertension    Dyslipidemia      Assessment & Plan     MV CAD,  EF 55-60%--plans for CABG tomorrow afternoon  NSTEMI  HTN--on NTG drip, bb  HLD--statin      Thank you for allowing us to participate in the care of this patient.      Dr. Ferris reviewed films and d/w Dr. Hall in cath lab.  Recommendations for CABG.  Dr. Ferris to call pt's wife via phone.  Preop orders initiated.    Grecia Isaacs, APRN  05/03/20  12:47    **all problems new to this examiner  **EKG and CXR independently reviewed and interpreted    Addendum  Patient was seen and examined, note verified by me, agree with findings.  Severe multivessel coronary artery disease with non-ST elevation MI.  I recommend CABG with BUNNY to prolonged survival and intentions.  I quoted an operative mortality of 1% calculated STS PROM to discuss a recent death as a surgery.  He wishes to proceed  Yahir Ferris m.D.

## 2020-05-03 NOTE — PROGRESS NOTES
"Cardiology [facility]      Patient Care Team:  Provider, No Known as PCP - General        Cardiology assessment and plan    Impressions:  Chest discomfort  Non-ST elevation myocardial infarction  Hypertension  Hyperlipidemia     Multivessel coronary artery disease  Significant obstructive coronary artery disease involving the right coronary artery left circumflex artery ramus branch and also diagonal system  Normal LV systolic function  Normal wall motion  Normal left-sided filling pressures  Estimated LV ejection fraction of 55 to 60%     Recommendations:  Aggressive risk factor modification  Cath films reviewed with the patient and also surgical team  Consideration for coronary artery bypass surgery versus two-vessel stenting of the right coronary artery and left circumflex artery  Admit the patient to CVCU bed  Further recommendations based on patient hospital course  Restart heparin after 6 hours of sheath pull        Chief Complaint: Chest pain    Subjective no further episodes of chest discomfort    Interval History: Pain-free    History taken from: patient    Review of Systems:  Review of Systems   Constitution: Negative for chills, decreased appetite and malaise/fatigue.   HENT: Negative for congestion.    Eyes: Negative for blurred vision and double vision.   Cardiovascular: Negative for chest pain, dyspnea on exertion, irregular heartbeat, leg swelling, near-syncope, orthopnea, palpitations, paroxysmal nocturnal dyspnea and syncope.   Respiratory: Negative for cough and shortness of breath.    Hematologic/Lymphatic: Negative for adenopathy. Does not bruise/bleed easily.   Skin: Negative for rash.   Gastrointestinal: Negative for bloating and abdominal pain.   Neurological: Negative for dizziness and focal weakness.       Objective    Vital Signs  Visit Vitals  /87   Pulse 60   Temp 98.1 °F (36.7 °C) (Oral)   Resp 14   Ht 188 cm (74\")   Wt 93.3 kg (205 lb 11 oz)   SpO2 97%   BMI 26.41 kg/m²     Oxygen " "Therapy  SpO2: 97 %  Pulse Oximetry Type: Intermittent  Device (Oxygen Therapy): room air  Device (Oxygen Therapy): room air  Flowsheet Rows      First Filed Value   Admission Height  188 cm (74\") Documented at 05/02/2020 1403   Admission Weight  95.7 kg (210 lb 15.7 oz) Documented at 05/02/2020 1403        Intake & Output (last 3 days)       04/30 0701 - 05/01 0700 05/01 0701 - 05/02 0700 05/02 0701 - 05/03 0700 05/03 0701 - 05/04 0700    P.O.   480     Total Intake(mL/kg)   480 (5.1)     Urine (mL/kg/hr)   660     Total Output   660     Net   -180                 Lines, Drains & Airways    Active LDAs     Name:   Placement date:   Placement time:   Site:   Days:    Peripheral IV 05/02/20 1428 Right Antecubital   05/02/20    1428    Antecubital   less than 1    Arterial Sheath 6 Fr. Right Femoral   05/03/20    1001    Femoral   less than 1                Physical Exam:  Physical Exam   Constitutional: He is oriented to person, place, and time. He appears well-developed and well-nourished.   HENT:   Head: Normocephalic and atraumatic.   Eyes: Pupils are equal, round, and reactive to light. Conjunctivae are normal.   Neck: Normal range of motion. Neck supple. No thyromegaly present.   Cardiovascular: Normal rate, regular rhythm, S1 normal, S2 normal and intact distal pulses.   Pulmonary/Chest: Effort normal and breath sounds normal.   Abdominal: Soft. Bowel sounds are normal.   Musculoskeletal: He exhibits no edema.   Neurological: He is alert and oriented to person, place, and time.   Skin: Skin is warm.   Nursing note and vitals reviewed.      Results Review:     I reviewed the patient's new clinical results.    Lab Results (last 24 hours)     Procedure Component Value Units Date/Time    CBC Auto Differential [[id number]]  (Normal) Collected:  05/03/20 0548    Specimen:  Blood Updated:  05/03/20 0736     WBC 6.90 10*3/mm3      RBC 4.49 10*6/mm3      Hemoglobin 14.3 g/dL      Hematocrit 41.3 %      MCV 92.0 fL      " MCH 31.8 pg      MCHC 34.5 g/dL      RDW 14.0 %      RDW-SD 45.5 fl      MPV 8.8 fL      Platelets 237 10*3/mm3      Comment: Result checked         Neutrophil % 56.6 %      Lymphocyte % 29.6 %      Monocyte % 8.5 %      Eosinophil % 4.4 %      Basophil % 0.9 %      Neutrophils, Absolute 3.90 10*3/mm3      Lymphocytes, Absolute 2.00 10*3/mm3      Monocytes, Absolute 0.60 10*3/mm3      Eosinophils, Absolute 0.30 10*3/mm3      Basophils, Absolute 0.10 10*3/mm3      nRBC 0.2 /100 WBC     Narrative:       Appended report. These results have been appended to a previously verified report.    Scan Slide [[id number]] Collected:  05/03/20 0548    Specimen:  Blood Updated:  05/03/20 0736     RBC Morphology Normal     WBC Morphology Normal     Giant Platelets Slight/1+    Troponin [[id number]]  (Abnormal) Collected:  05/03/20 0548    Specimen:  Blood Updated:  05/03/20 0635     Troponin T 0.400 ng/mL     Narrative:       Troponin T Reference Range:  <= 0.03 ng/mL-   Negative for AMI  >0.03 ng/mL-     Abnormal for myocardial necrosis.  Clinicians would have to utilize clinical acumen, EKG, Troponin and serial changes to determine if it is an Acute Myocardial Infarction or myocardial injury due to an underlying chronic condition.       Results may be falsely decreased if patient taking Biotin.      Basic Metabolic Panel [[id number]]  (Abnormal) Collected:  05/03/20 0548    Specimen:  Blood Updated:  05/03/20 0630     Glucose 118 mg/dL      BUN 14 mg/dL      Creatinine 1.01 mg/dL      Sodium 142 mmol/L      Potassium 4.7 mmol/L      Chloride 106 mmol/L      CO2 27.0 mmol/L      Calcium 8.6 mg/dL      eGFR Non African Amer 73 mL/min/1.73      BUN/Creatinine Ratio 13.9     Anion Gap 9.0 mmol/L     Narrative:       GFR Normal >60  Chronic Kidney Disease <60  Kidney Failure <15      Magnesium [[id number]]  (Normal) Collected:  05/03/20 0548    Specimen:  Blood Updated:  05/03/20 0630     Magnesium 2.2 mg/dL     aPTT [[id number]]   (Abnormal) Collected:  05/03/20 0548    Specimen:  Blood Updated:  05/03/20 0621     PTT 42.3 seconds     Protime-INR [[id number]]  (Normal) Collected:  05/03/20 0548    Specimen:  Blood Updated:  05/03/20 0621     Protime 11.0 Seconds      INR 1.06    Troponin [[id number]]  (Abnormal) Collected:  05/02/20 2206    Specimen:  Blood Updated:  05/02/20 2251     Troponin T 0.501 ng/mL     Narrative:       Troponin T Reference Range:  <= 0.03 ng/mL-   Negative for AMI  >0.03 ng/mL-     Abnormal for myocardial necrosis.  Clinicians would have to utilize clinical acumen, EKG, Troponin and serial changes to determine if it is an Acute Myocardial Infarction or myocardial injury due to an underlying chronic condition.       Results may be falsely decreased if patient taking Biotin.      aPTT [[id number]]  (Abnormal) Collected:  05/02/20 2206    Specimen:  Blood Updated:  05/02/20 2240     PTT 37.6 seconds     aPTT [[id number]]  (Abnormal) Collected:  05/02/20 1442    Specimen:  Blood Updated:  05/02/20 1548     PTT 25.2 seconds     Protime-INR [[id number]]  (Normal) Collected:  05/02/20 1442    Specimen:  Blood Updated:  05/02/20 1548     Protime 10.5 Seconds      INR 1.00    Extra Tubes [[id number]] Collected:  05/02/20 1442    Specimen:  Blood Updated:  05/02/20 1545    Narrative:       The following orders were created for panel order Extra Tubes.  Procedure                               Abnormality         Status                     ---------                               -----------         ------                     Light Blue Top[[id number]]                                   Final result                 Please view results for these tests on the individual orders.    Light Blue Top [[id number]] Collected:  05/02/20 1442    Specimen:  Blood Updated:  05/02/20 1545     Extra Tube hold for add-on     Comment: Auto resulted       Troponin [[id number]]  (Abnormal) Collected:  05/02/20 1442    Specimen:  Blood Updated:  05/02/20 1510      Troponin T 0.398 ng/mL     Narrative:       Troponin T Reference Range:  <= 0.03 ng/mL-   Negative for AMI  >0.03 ng/mL-     Abnormal for myocardial necrosis.  Clinicians would have to utilize clinical acumen, EKG, Troponin and serial changes to determine if it is an Acute Myocardial Infarction or myocardial injury due to an underlying chronic condition.       Results may be falsely decreased if patient taking Biotin.      Basic Metabolic Panel [[id number]]  (Abnormal) Collected:  05/02/20 1442    Specimen:  Blood Updated:  05/02/20 1508     Glucose 108 mg/dL      BUN 17 mg/dL      Creatinine 1.01 mg/dL      Sodium 142 mmol/L      Potassium 4.0 mmol/L      Chloride 104 mmol/L      CO2 26.0 mmol/L      Calcium 8.9 mg/dL      eGFR Non African Amer 73 mL/min/1.73      BUN/Creatinine Ratio 16.8     Anion Gap 12.0 mmol/L     Narrative:       GFR Normal >60  Chronic Kidney Disease <60  Kidney Failure <15      CBC & Differential [[id number]] Collected:  05/02/20 1442    Specimen:  Blood Updated:  05/02/20 1449    Narrative:       The following orders were created for panel order CBC & Differential.  Procedure                               Abnormality         Status                     ---------                               -----------         ------                     CBC Auto Differential[[id number]]        Normal              Final result                 Please view results for these tests on the individual orders.    CBC Auto Differential [[id number]]  (Normal) Collected:  05/02/20 1442    Specimen:  Blood Updated:  05/02/20 1449     WBC 6.80 10*3/mm3      RBC 4.97 10*6/mm3      Hemoglobin 15.8 g/dL      Hematocrit 46.3 %      MCV 93.2 fL      MCH 31.7 pg      MCHC 34.1 g/dL      RDW 14.1 %      RDW-SD 45.5 fl      MPV 8.7 fL      Platelets 269 10*3/mm3      Neutrophil % 60.6 %      Lymphocyte % 25.0 %      Monocyte % 9.8 %      Eosinophil % 3.2 %      Basophil % 1.4 %      Neutrophils, Absolute 4.10 10*3/mm3       Lymphocytes, Absolute 1.70 10*3/mm3      Monocytes, Absolute 0.70 10*3/mm3      Eosinophils, Absolute 0.20 10*3/mm3      Basophils, Absolute 0.10 10*3/mm3      nRBC 0.1 /100 WBC                Medication Review:   I have reviewed the patient's current medication list  Scheduled Meds:  [MAR Hold] aspirin 81 mg Oral Daily   [MAR Hold] atorvastatin 40 mg Oral Nightly   lisinopril 20 mg Oral BID   metoprolol tartrate 25 mg Oral Q12H   [MAR Hold] sodium chloride 10 mL Intravenous Q12H     Continuous Infusions:  heparin 10.4 Units/kg/hr Last Rate: Stopped (05/03/20 0920)   nitroglycerin 5-200 mcg/min Last Rate: 5 mcg/min (05/03/20 0918)     PRN Meds:.•  [MAR Hold] acetaminophen **OR** [MAR Hold] acetaminophen **OR** [MAR Hold] acetaminophen  •  [MAR Hold] aluminum-magnesium hydroxide-simethicone  •  [MAR Hold] bisacodyl  •  [MAR Hold] heparin  •  [MAR Hold] heparin  •  [MAR Hold] magnesium hydroxide  •  [MAR Hold] magnesium sulfate **OR** [MAR Hold] magnesium sulfate in D5W 1g/100mL (PREMIX)  •  [MAR Hold] melatonin  •  [MAR Hold] nitroglycerin  •  [MAR Hold] ondansetron **OR** [MAR Hold] ondansetron  •  [MAR Hold] potassium chloride  •  [COMPLETED] Insert peripheral IV **AND** [MAR Hold] sodium chloride  •  [MAR Hold] sodium chloride  •  [MAR Hold] valACYclovir    ECG/EMG Results (last 24 hours)     Procedure Component Value Units Date/Time    ECG 12 Lead [[id number]] Collected:  05/02/20 1412     Updated:  05/02/20 1415    Narrative:       HEART RATE= 69  bpm  RR Interval= 868  ms  TN Interval= 210  ms  P Horizontal Axis= -8  deg  P Front Axis= 43  deg  QRSD Interval= 76  ms  QT Interval= 382  ms  QRS Axis= 9  deg  T Wave Axis= 25  deg  - NORMAL ECG -  Sinus rhythm  No previous ECG available for comparison  Electronically Signed By:   Date and Time of Study: 2020-05-02 14:12:41          Imaging Results (Last 24 Hours)     Procedure Component Value Units Date/Time    XR Chest 1 View [[id number]] Collected:  05/02/20 1502        Updated:  05/02/20 1504    Narrative:       XR CHEST 1 VW-     Date of Exam: 5/2/2020 2:56 PM     Indication: pain  70-year-old male with chest discomfort, shortness of  breath     Comparison: None available.     Technique: 1 view(s) of the chest were obtained.     FINDINGS: The lungs are well expanded. Cardiac, hilar and  mediastinal silhouettes are normal. No pneumothorax, pleural effusion or  focal pulmonary parenchymal opacity. Pulmonary vascularity is within  normal limits. The trachea is midline. Visualized bony structures are  intact.       Impression:       No active cardiopulmonary disease.        Electronically Signed By-Albino Mckinley DO. On:5/2/2020 3:02 PM  This report was finalized on [id number] by  Albino Mckinley DO..          Assessment/Plan       Subendocardial MI first episode care (CMS/[facility])    Essential hypertension    Dyslipidemia        Eliezer Hall MD  05/03/20  11:21

## 2020-05-04 ENCOUNTER — ANESTHESIA (OUTPATIENT)
Dept: PERIOP | Facility: HOSPITAL | Age: 70
End: 2020-05-04

## 2020-05-04 ENCOUNTER — APPOINTMENT (OUTPATIENT)
Dept: GENERAL RADIOLOGY | Facility: HOSPITAL | Age: 70
End: 2020-05-04

## 2020-05-04 LAB
ACT BLD: 114 SECONDS (ref 89–137)
ACT BLD: 114 SECONDS (ref 89–137)
ACT BLD: 367 SECONDS (ref 89–137)
ACT BLD: 400 SECONDS (ref 89–137)
ACT BLD: 466 SECONDS (ref 89–137)
ALBUMIN SERPL-MCNC: 4.1 G/DL (ref 3.5–5.2)
ANION GAP SERPL CALCULATED.3IONS-SCNC: 11 MMOL/L (ref 5–15)
APTT PPP: 26.9 SECONDS (ref 24–31)
ARTERIAL PATENCY WRIST A: ABNORMAL
ATMOSPHERIC PRESS: ABNORMAL MM[HG]
BASE DEFICIT: ABNORMAL
BASE EXCESS BLDA CALC-SCNC: -1.4 MMOL/L (ref 0–3)
BASE EXCESS BLDA CALC-SCNC: -1.7 MMOL/L (ref 0–3)
BASE EXCESS BLDA CALC-SCNC: -2.4 MMOL/L (ref 0–3)
BASE EXCESS BLDA CALC-SCNC: -2.4 MMOL/L (ref 0–3)
BASE EXCESS BLDA CALC-SCNC: -2.7 MMOL/L (ref 0–3)
BASE EXCESS BLDA CALC-SCNC: -4.2 MMOL/L (ref 0–3)
BASE EXCESS BLDA CALC-SCNC: <0 MMOL/L (ref 0–3)
BASE EXCESS BLDV CALC-SCNC: ABNORMAL MMOL/L
BASOPHILS # BLD AUTO: 0 10*3/MM3 (ref 0–0.2)
BASOPHILS # BLD AUTO: 0.1 10*3/MM3 (ref 0–0.2)
BASOPHILS NFR BLD AUTO: 0.3 % (ref 0–1.5)
BASOPHILS NFR BLD AUTO: 1.2 % (ref 0–1.5)
BDY SITE: ABNORMAL
BH BB BLOOD EXPIRATION DATE: NORMAL
BH BB BLOOD EXPIRATION DATE: NORMAL
BH BB BLOOD TYPE BARCODE: 5100
BH BB BLOOD TYPE BARCODE: 5100
BH BB DISPENSE STATUS: NORMAL
BH BB DISPENSE STATUS: NORMAL
BH BB PRODUCT CODE: NORMAL
BH BB PRODUCT CODE: NORMAL
BH BB UNIT NUMBER: NORMAL
BH BB UNIT NUMBER: NORMAL
BUN BLD-MCNC: 18 MG/DL (ref 8–23)
BUN/CREAT SERPL: 14.3 (ref 7–25)
CA-I BLDA-SCNC: 0.87 MMOL/L (ref 1.15–1.33)
CA-I BLDA-SCNC: 0.93 MMOL/L (ref 1.15–1.33)
CA-I BLDA-SCNC: 0.99 MMOL/L (ref 1.15–1.33)
CA-I BLDA-SCNC: 1 MMOL/L (ref 1.12–1.32)
CA-I BLDA-SCNC: 1 MMOL/L (ref 1.12–1.32)
CA-I BLDA-SCNC: 1.01 MMOL/L (ref 1.12–1.32)
CA-I BLDA-SCNC: 1.01 MMOL/L (ref 1.12–1.32)
CA-I BLDA-SCNC: 1.06 MMOL/L (ref 1.15–1.33)
CA-I BLDA-SCNC: 1.1 MMOL/L (ref 1.12–1.32)
CA-I BLDA-SCNC: 1.14 MMOL/L (ref 1.12–1.32)
CA-I BLDA-SCNC: 1.15 MMOL/L (ref 1.15–1.33)
CA-I SERPL ISE-MCNC: 1.11 MMOL/L (ref 1.2–1.3)
CALCIUM SPEC-SCNC: 7.9 MG/DL (ref 8.6–10.5)
CHLORIDE SERPL-SCNC: 108 MMOL/L (ref 98–107)
CO2 BLDA-SCNC: 23.6 MMOL/L (ref 22–29)
CO2 BLDA-SCNC: 23.8 MMOL/L (ref 22–29)
CO2 BLDA-SCNC: 24.4 MMOL/L (ref 22–29)
CO2 BLDA-SCNC: 25 MMOL/L (ref 22–29)
CO2 BLDA-SCNC: 25.2 MMOL/L (ref 22–29)
CO2 BLDA-SCNC: 26 MMOL/L (ref 22–29)
CO2 BLDA-SCNC: 26 MMOL/L (ref 23–27)
CO2 BLDA-SCNC: 27 MMOL/L (ref 23–27)
CO2 BLDA-SCNC: 28 MMOL/L (ref 23–27)
CO2 CONTENT VENOUS: 26 MMOL/L (ref 24–29)
CO2 SERPL-SCNC: 21 MMOL/L (ref 22–29)
CREAT BLD-MCNC: 1.26 MG/DL (ref 0.76–1.27)
CROSSMATCH INTERPRETATION: NORMAL
CROSSMATCH INTERPRETATION: NORMAL
DEPRECATED RDW RBC AUTO: 45.5 FL (ref 37–54)
DEPRECATED RDW RBC AUTO: 47.3 FL (ref 37–54)
EOSINOPHIL # BLD AUTO: 0.1 10*3/MM3 (ref 0–0.4)
EOSINOPHIL # BLD AUTO: 0.2 10*3/MM3 (ref 0–0.4)
EOSINOPHIL NFR BLD AUTO: 1 % (ref 0.3–6.2)
EOSINOPHIL NFR BLD AUTO: 2.9 % (ref 0.3–6.2)
ERYTHROCYTE [DISTWIDTH] IN BLOOD BY AUTOMATED COUNT: 13.9 % (ref 12.3–15.4)
ERYTHROCYTE [DISTWIDTH] IN BLOOD BY AUTOMATED COUNT: 14.4 % (ref 12.3–15.4)
GFR SERPL CREATININE-BSD FRML MDRD: 57 ML/MIN/1.73
GLUCOSE BLD-MCNC: 78 MG/DL (ref 65–99)
GLUCOSE BLDC GLUCOMTR-MCNC: 101 MG/DL (ref 70–105)
GLUCOSE BLDC GLUCOMTR-MCNC: 110 MG/DL (ref 70–105)
GLUCOSE BLDC GLUCOMTR-MCNC: 112 MG/DL (ref 70–105)
GLUCOSE BLDC GLUCOMTR-MCNC: 115 MG/DL (ref 74–100)
GLUCOSE BLDC GLUCOMTR-MCNC: 115 MG/DL (ref 74–100)
GLUCOSE BLDC GLUCOMTR-MCNC: 120 MG/DL (ref 70–105)
GLUCOSE BLDC GLUCOMTR-MCNC: 126 MG/DL (ref 74–100)
GLUCOSE BLDC GLUCOMTR-MCNC: 127 MG/DL (ref 70–105)
GLUCOSE BLDC GLUCOMTR-MCNC: 128 MG/DL (ref 70–105)
GLUCOSE BLDC GLUCOMTR-MCNC: 129 MG/DL (ref 70–105)
GLUCOSE BLDC GLUCOMTR-MCNC: 134 MG/DL (ref 70–105)
GLUCOSE BLDC GLUCOMTR-MCNC: 141 MG/DL (ref 70–105)
GLUCOSE BLDC GLUCOMTR-MCNC: 146 MG/DL (ref 70–105)
GLUCOSE BLDC GLUCOMTR-MCNC: 147 MG/DL (ref 70–105)
GLUCOSE BLDC GLUCOMTR-MCNC: 157 MG/DL (ref 74–100)
GLUCOSE BLDC GLUCOMTR-MCNC: 79 MG/DL (ref 74–100)
HBA1C MFR BLD: 5.5 % (ref 3.5–5.6)
HCO3 BLDA-SCNC: 22.2 MMOL/L (ref 21–28)
HCO3 BLDA-SCNC: 22.6 MMOL/L (ref 21–28)
HCO3 BLDA-SCNC: 23.1 MMOL/L (ref 21–28)
HCO3 BLDA-SCNC: 23.7 MMOL/L (ref 21–28)
HCO3 BLDA-SCNC: 24 MMOL/L (ref 21–28)
HCO3 BLDA-SCNC: 24.1 MMOL/L (ref 22–26)
HCO3 BLDA-SCNC: 24.4 MMOL/L (ref 21–28)
HCO3 BLDA-SCNC: 24.8 MMOL/L (ref 22–26)
HCO3 BLDA-SCNC: 25.3 MMOL/L (ref 22–26)
HCO3 BLDA-SCNC: 25.5 MMOL/L (ref 22–26)
HCO3 BLDA-SCNC: 25.8 MMOL/L (ref 22–26)
HCO3 BLDV-SCNC: 24.7 MMOL/L (ref 23–28)
HCT VFR BLD AUTO: 35.3 % (ref 37.5–51)
HCT VFR BLD AUTO: 41.4 % (ref 37.5–51)
HCT VFR BLDA CALC: 30 % (ref 38–51)
HCT VFR BLDA CALC: 30 % (ref 38–51)
HCT VFR BLDA CALC: 33 % (ref 38–51)
HCT VFR BLDA CALC: 35 % (ref 38–51)
HCT VFR BLDA CALC: 36 % (ref 38–51)
HCT VFR BLDA CALC: 36 % (ref 38–51)
HCT VFR BLDA CALC: 37 % (ref 38–51)
HEMODILUTION: NO
HEMODILUTION: YES
HGB BLD-MCNC: 12 G/DL (ref 13–17.7)
HGB BLD-MCNC: 14.2 G/DL (ref 13–17.7)
HGB BLDA-MCNC: 10.2 G/DL (ref 12–17)
HGB BLDA-MCNC: 10.2 G/DL (ref 12–17)
HGB BLDA-MCNC: 11.2 G/DL (ref 12–17)
HGB BLDA-MCNC: 12 G/DL (ref 12–17)
HGB BLDA-MCNC: 12.3 G/DL (ref 12–17)
HGB BLDA-MCNC: 12.4 G/DL (ref 12–17)
HGB BLDA-MCNC: 12.5 G/DL (ref 12–17)
HGB BLDA-MCNC: 12.5 G/DL (ref 12–17)
HGB BLDA-MCNC: 12.6 G/DL (ref 12–17)
HOROWITZ INDEX BLD+IHG-RTO: 40 %
HOROWITZ INDEX BLD+IHG-RTO: 60 %
HOROWITZ INDEX BLD+IHG-RTO: 70 %
INR PPP: 1.18 (ref 0.9–1.1)
LYMPHOCYTES # BLD AUTO: 1 10*3/MM3 (ref 0.7–3.1)
LYMPHOCYTES # BLD AUTO: 1.9 10*3/MM3 (ref 0.7–3.1)
LYMPHOCYTES NFR BLD AUTO: 23.6 % (ref 19.6–45.3)
LYMPHOCYTES NFR BLD AUTO: 8.3 % (ref 19.6–45.3)
MAGNESIUM SERPL-MCNC: 2.1 MG/DL (ref 1.6–2.4)
MCH RBC QN AUTO: 31.7 PG (ref 26.6–33)
MCH RBC QN AUTO: 32 PG (ref 26.6–33)
MCHC RBC AUTO-ENTMCNC: 34.1 G/DL (ref 31.5–35.7)
MCHC RBC AUTO-ENTMCNC: 34.2 G/DL (ref 31.5–35.7)
MCV RBC AUTO: 93.1 FL (ref 79–97)
MCV RBC AUTO: 93.3 FL (ref 79–97)
MODALITY: ABNORMAL
MONOCYTES # BLD AUTO: 0.7 10*3/MM3 (ref 0.1–0.9)
MONOCYTES # BLD AUTO: 0.8 10*3/MM3 (ref 0.1–0.9)
MONOCYTES NFR BLD AUTO: 7 % (ref 5–12)
MONOCYTES NFR BLD AUTO: 9.2 % (ref 5–12)
NEUTROPHILS # BLD AUTO: 5 10*3/MM3 (ref 1.7–7)
NEUTROPHILS # BLD AUTO: 9.7 10*3/MM3 (ref 1.7–7)
NEUTROPHILS NFR BLD AUTO: 63.1 % (ref 42.7–76)
NEUTROPHILS NFR BLD AUTO: 83.4 % (ref 42.7–76)
NRBC BLD AUTO-RTO: 0 /100 WBC (ref 0–0.2)
NRBC BLD AUTO-RTO: 0.1 /100 WBC (ref 0–0.2)
PCO2 BLDA: 40 MM HG (ref 35–48)
PCO2 BLDA: 41.6 MM HG (ref 35–48)
PCO2 BLDA: 41.6 MM HG (ref 35–48)
PCO2 BLDA: 41.7 MM HG (ref 35–48)
PCO2 BLDA: 45 MM HG (ref 35–48)
PCO2 BLDA: 47 MM HG (ref 35–45)
PCO2 BLDA: 49.6 MM HG (ref 35–48)
PCO2 BLDA: 50 MM HG (ref 35–45)
PCO2 BLDA: 54 MM HG (ref 35–45)
PCO2 BLDA: 58 MM HG (ref 35–45)
PCO2 BLDA: 61 MM HG (ref 35–45)
PCO2 BLDV: 58 MM HG (ref 41–51)
PEEP RESPIRATORY: 5 CM[H2O]
PH BLDA: 7.23 PH UNITS (ref 7.35–7.45)
PH BLDA: 7.24 PH UNITS (ref 7.35–7.45)
PH BLDA: 7.28 PH UNITS (ref 7.35–7.45)
PH BLDA: 7.29 PH UNITS (ref 7.35–7.45)
PH BLDA: 7.3 PH UNITS (ref 7.35–7.45)
PH BLDA: 7.3 PH UNITS (ref 7.35–7.45)
PH BLDA: 7.34 PH UNITS (ref 7.35–7.45)
PH BLDA: 7.35 PH UNITS (ref 7.35–7.45)
PH BLDA: 7.36 PH UNITS (ref 7.35–7.45)
PH BLDA: 7.36 PH UNITS (ref 7.35–7.45)
PH BLDA: 7.37 PH UNITS (ref 7.35–7.45)
PH BLDV: 7.24 PH UNITS (ref 7.31–7.41)
PHOSPHATE SERPL-MCNC: 2 MG/DL (ref 2.5–4.5)
PLATELET # BLD AUTO: 153 10*3/MM3 (ref 140–450)
PLATELET # BLD AUTO: 228 10*3/MM3 (ref 140–450)
PMV BLD AUTO: 8.3 FL (ref 6–12)
PMV BLD AUTO: 8.7 FL (ref 6–12)
PO2 BLDA: 125.1 MM HG (ref 83–108)
PO2 BLDA: 128.9 MM HG (ref 83–108)
PO2 BLDA: 139.5 MM HG (ref 83–108)
PO2 BLDA: 378 MM HG (ref 80–105)
PO2 BLDA: 381 MM HG (ref 80–105)
PO2 BLDA: 413 MM HG (ref 80–105)
PO2 BLDA: 421 MM HG (ref 80–105)
PO2 BLDA: 439 MM HG (ref 80–105)
PO2 BLDA: 98.5 MM HG (ref 83–108)
PO2 BLDA: 98.8 MM HG (ref 83–108)
PO2 BLDA: 99 MM HG (ref 83–108)
PO2 BLDV: 57 MM HG
POTASSIUM BLD-SCNC: 4 MMOL/L (ref 3.5–5.2)
POTASSIUM BLD-SCNC: 4.5 MMOL/L (ref 3.5–5.2)
POTASSIUM BLDA-SCNC: 3.8 MMOL/L (ref 3.5–4.9)
POTASSIUM BLDA-SCNC: 4 MMOL/L (ref 3.5–4.5)
POTASSIUM BLDA-SCNC: 4.2 MMOL/L (ref 3.5–4.5)
POTASSIUM BLDA-SCNC: 4.3 MMOL/L (ref 3.5–4.5)
POTASSIUM BLDA-SCNC: 4.3 MMOL/L (ref 3.5–4.9)
POTASSIUM BLDA-SCNC: 4.7 MMOL/L (ref 3.5–4.9)
POTASSIUM BLDA-SCNC: 5.3 MMOL/L (ref 3.5–4.9)
POTASSIUM BLDA-SCNC: 6.1 MMOL/L (ref 3.5–4.9)
POTASSIUM BLDA-SCNC: 6.8 MMOL/L (ref 3.5–4.9)
PROTHROMBIN TIME: 12.1 SECONDS (ref 9.6–11.7)
PSV: 8 CMH2O
PSV: 8 CMH2O
RBC # BLD AUTO: 3.79 10*6/MM3 (ref 4.14–5.8)
RBC # BLD AUTO: 4.43 10*6/MM3 (ref 4.14–5.8)
RESPIRATORY RATE: 14
SAO2 % BLDCOA: 100 % (ref 95–98)
SAO2 % BLDCOA: 97.4 % (ref 94–98)
SAO2 % BLDCOA: 98.4 % (ref 94–98)
SAO2 % BLDCOA: 98.6 % (ref 94–98)
SAO2 % BLDCOA: 99 % (ref 94–98)
SAO2 % BLDCOV: 84 %
SODIUM BLD-SCNC: 140 MMOL/L (ref 136–145)
SODIUM BLDA-SCNC: 133 MMOL/L (ref 138–146)
SODIUM BLDA-SCNC: 134 MMOL/L (ref 138–146)
SODIUM BLDA-SCNC: 135 MMOL/L (ref 138–146)
SODIUM BLDA-SCNC: 136 MMOL/L (ref 138–146)
SODIUM BLDA-SCNC: 136 MMOL/L (ref 138–146)
SODIUM BLDA-SCNC: 140 MMOL/L (ref 138–146)
SODIUM BLDA-SCNC: 141 MMOL/L (ref 138–146)
SODIUM BLDA-SCNC: 142 MMOL/L (ref 138–146)
SODIUM BLDA-SCNC: 143 MMOL/L (ref 138–146)
SODIUM BLDA-SCNC: 143 MMOL/L (ref 138–146)
SODIUM BLDA-SCNC: 144 MMOL/L (ref 138–146)
UNIT  ABO: NORMAL
UNIT  ABO: NORMAL
UNIT  RH: NORMAL
UNIT  RH: NORMAL
VENTILATOR MODE: ABNORMAL
VT ON VENT VENT: 750 ML
WBC NRBC COR # BLD: 11.6 10*3/MM3 (ref 3.4–10.8)
WBC NRBC COR # BLD: 8 10*3/MM3 (ref 3.4–10.8)

## 2020-05-04 PROCEDURE — 85610 PROTHROMBIN TIME: CPT | Performed by: NURSE PRACTITIONER

## 2020-05-04 PROCEDURE — C1751 CATH, INF, PER/CENT/MIDLINE: HCPCS | Performed by: ANESTHESIOLOGY

## 2020-05-04 PROCEDURE — C1713 ANCHOR/SCREW BN/BN,TIS/BN: HCPCS | Performed by: THORACIC SURGERY (CARDIOTHORACIC VASCULAR SURGERY)

## 2020-05-04 PROCEDURE — 25010000003 CEFAZOLIN PER 500 MG: Performed by: ANESTHESIOLOGY

## 2020-05-04 PROCEDURE — 25010000002 PROTAMINE SULFATE PER 10 MG: Performed by: ANESTHESIOLOGY

## 2020-05-04 PROCEDURE — 06BQ0ZZ EXCISION OF LEFT SAPHENOUS VEIN, OPEN APPROACH: ICD-10-PCS | Performed by: THORACIC SURGERY (CARDIOTHORACIC VASCULAR SURGERY)

## 2020-05-04 PROCEDURE — 06BP4ZZ EXCISION OF RIGHT SAPHENOUS VEIN, PERCUTANEOUS ENDOSCOPIC APPROACH: ICD-10-PCS | Performed by: THORACIC SURGERY (CARDIOTHORACIC VASCULAR SURGERY)

## 2020-05-04 PROCEDURE — C1729 CATH, DRAINAGE: HCPCS | Performed by: THORACIC SURGERY (CARDIOTHORACIC VASCULAR SURGERY)

## 2020-05-04 PROCEDURE — 93005 ELECTROCARDIOGRAM TRACING: CPT | Performed by: NURSE PRACTITIONER

## 2020-05-04 PROCEDURE — 85347 COAGULATION TIME ACTIVATED: CPT

## 2020-05-04 PROCEDURE — 25010000002 FENTANYL CITRATE (PF) 250 MCG/5ML SOLUTION: Performed by: ANESTHESIOLOGY

## 2020-05-04 PROCEDURE — 85730 THROMBOPLASTIN TIME PARTIAL: CPT | Performed by: NURSE PRACTITIONER

## 2020-05-04 PROCEDURE — 80069 RENAL FUNCTION PANEL: CPT | Performed by: NURSE PRACTITIONER

## 2020-05-04 PROCEDURE — 71045 X-RAY EXAM CHEST 1 VIEW: CPT

## 2020-05-04 PROCEDURE — 25010000002 MAGNESIUM SULFATE IN D5W 1G/100ML (PREMIX) 1-5 GM/100ML-% SOLUTION: Performed by: NURSE PRACTITIONER

## 2020-05-04 PROCEDURE — 25010000002 ALBUMIN HUMAN 5% PER 50 ML

## 2020-05-04 PROCEDURE — 25010000002 PROPOFOL 1000 MG/100ML EMULSION: Performed by: ANESTHESIOLOGY

## 2020-05-04 PROCEDURE — 82330 ASSAY OF CALCIUM: CPT | Performed by: NURSE PRACTITIONER

## 2020-05-04 PROCEDURE — 25010000002 MIDAZOLAM PER 1 MG: Performed by: ANESTHESIOLOGY

## 2020-05-04 PROCEDURE — 25010000002 VANCOMYCIN 1 G RECONSTITUTED SOLUTION 1 EACH VIAL: Performed by: THORACIC SURGERY (CARDIOTHORACIC VASCULAR SURGERY)

## 2020-05-04 PROCEDURE — 93318 ECHO TRANSESOPHAGEAL INTRAOP: CPT | Performed by: ANESTHESIOLOGY

## 2020-05-04 PROCEDURE — 84132 ASSAY OF SERUM POTASSIUM: CPT

## 2020-05-04 PROCEDURE — 85018 HEMOGLOBIN: CPT

## 2020-05-04 PROCEDURE — 85025 COMPLETE CBC W/AUTO DIFF WBC: CPT | Performed by: NURSE PRACTITIONER

## 2020-05-04 PROCEDURE — P9041 ALBUMIN (HUMAN),5%, 50ML: HCPCS | Performed by: NURSE PRACTITIONER

## 2020-05-04 PROCEDURE — 25010000002 CEFAZOLIN PER 500 MG: Performed by: NURSE PRACTITIONER

## 2020-05-04 PROCEDURE — 82803 BLOOD GASES ANY COMBINATION: CPT

## 2020-05-04 PROCEDURE — 5A1221Z PERFORMANCE OF CARDIAC OUTPUT, CONTINUOUS: ICD-10-PCS | Performed by: THORACIC SURGERY (CARDIOTHORACIC VASCULAR SURGERY)

## 2020-05-04 PROCEDURE — P9041 ALBUMIN (HUMAN),5%, 50ML: HCPCS

## 2020-05-04 PROCEDURE — 33508 ENDOSCOPIC VEIN HARVEST: CPT | Performed by: THORACIC SURGERY (CARDIOTHORACIC VASCULAR SURGERY)

## 2020-05-04 PROCEDURE — 94002 VENT MGMT INPAT INIT DAY: CPT

## 2020-05-04 PROCEDURE — 82947 ASSAY GLUCOSE BLOOD QUANT: CPT

## 2020-05-04 PROCEDURE — 84295 ASSAY OF SERUM SODIUM: CPT

## 2020-05-04 PROCEDURE — 82330 ASSAY OF CALCIUM: CPT

## 2020-05-04 PROCEDURE — 94799 UNLISTED PULMONARY SVC/PX: CPT

## 2020-05-04 PROCEDURE — 33533 CABG ARTERIAL SINGLE: CPT | Performed by: THORACIC SURGERY (CARDIOTHORACIC VASCULAR SURGERY)

## 2020-05-04 PROCEDURE — B24BZZ4 ULTRASONOGRAPHY OF HEART WITH AORTA, TRANSESOPHAGEAL: ICD-10-PCS | Performed by: ANESTHESIOLOGY

## 2020-05-04 PROCEDURE — 82962 GLUCOSE BLOOD TEST: CPT

## 2020-05-04 PROCEDURE — 25010000002 HEPARIN (PORCINE) PER 1000 UNITS: Performed by: THORACIC SURGERY (CARDIOTHORACIC VASCULAR SURGERY)

## 2020-05-04 PROCEDURE — 83735 ASSAY OF MAGNESIUM: CPT | Performed by: INTERNAL MEDICINE

## 2020-05-04 PROCEDURE — 021209W BYPASS CORONARY ARTERY, THREE ARTERIES FROM AORTA WITH AUTOLOGOUS VENOUS TISSUE, OPEN APPROACH: ICD-10-PCS | Performed by: THORACIC SURGERY (CARDIOTHORACIC VASCULAR SURGERY)

## 2020-05-04 PROCEDURE — 25010000002 ALBUMIN HUMAN 5% PER 50 ML: Performed by: NURSE PRACTITIONER

## 2020-05-04 PROCEDURE — 25010000002 MAGNESIUM SULFATE PER 500 MG OF MAGNESIUM: Performed by: ANESTHESIOLOGY

## 2020-05-04 PROCEDURE — 84132 ASSAY OF SERUM POTASSIUM: CPT | Performed by: INTERNAL MEDICINE

## 2020-05-04 PROCEDURE — A4648 IMPLANTABLE TISSUE MARKER: HCPCS | Performed by: THORACIC SURGERY (CARDIOTHORACIC VASCULAR SURGERY)

## 2020-05-04 PROCEDURE — 25010000002 PAPAVERINE PER 60 MG: Performed by: THORACIC SURGERY (CARDIOTHORACIC VASCULAR SURGERY)

## 2020-05-04 PROCEDURE — 99232 SBSQ HOSP IP/OBS MODERATE 35: CPT | Performed by: INTERNAL MEDICINE

## 2020-05-04 PROCEDURE — 85014 HEMATOCRIT: CPT

## 2020-05-04 PROCEDURE — 25010000002 HEPARIN (PORCINE) PER 1000 UNITS: Performed by: ANESTHESIOLOGY

## 2020-05-04 PROCEDURE — 02100Z8 BYPASS CORONARY ARTERY, ONE ARTERY FROM RIGHT INTERNAL MAMMARY, OPEN APPROACH: ICD-10-PCS | Performed by: THORACIC SURGERY (CARDIOTHORACIC VASCULAR SURGERY)

## 2020-05-04 PROCEDURE — 80051 ELECTROLYTE PANEL: CPT

## 2020-05-04 PROCEDURE — 33519 CABG ARTERY-VEIN THREE: CPT | Performed by: THORACIC SURGERY (CARDIOTHORACIC VASCULAR SURGERY)

## 2020-05-04 PROCEDURE — 85025 COMPLETE CBC W/AUTO DIFF WBC: CPT | Performed by: INTERNAL MEDICINE

## 2020-05-04 DEVICE — SS SUTURE, 6 PER SLEEVE
Type: IMPLANTABLE DEVICE | Site: CHEST | Status: FUNCTIONAL
Brand: MYO/WIRE II

## 2020-05-04 DEVICE — DEV CONTRL TISS STRATAFIX SPIRAL MNCRYL UD 3/0 PLS 30CM: Type: IMPLANTABLE DEVICE | Site: CHEST | Status: FUNCTIONAL

## 2020-05-04 DEVICE — WAX,BONE,NATURAL
Type: IMPLANTABLE DEVICE | Site: CHEST | Status: FUNCTIONAL
Brand: MEDLINE INDUSTRIES

## 2020-05-04 DEVICE — SS SUTURE, 3 PER SLEEVE
Type: IMPLANTABLE DEVICE | Site: CHEST | Status: FUNCTIONAL
Brand: MYO/WIRE II

## 2020-05-04 DEVICE — DEV CONTRL TISS STRATAFIXSPIRALMNCRYL PLSPS2 REV3/0 15CM: Type: IMPLANTABLE DEVICE | Status: FUNCTIONAL

## 2020-05-04 RX ORDER — AMINOCAPROIC ACID 250 MG/ML
INJECTION, SOLUTION INTRAVENOUS AS NEEDED
Status: DISCONTINUED | OUTPATIENT
Start: 2020-05-04 | End: 2020-05-04 | Stop reason: SURG

## 2020-05-04 RX ORDER — ATORVASTATIN CALCIUM 40 MG/1
40 TABLET, FILM COATED ORAL NIGHTLY
Status: DISCONTINUED | OUTPATIENT
Start: 2020-05-05 | End: 2020-05-08 | Stop reason: HOSPADM

## 2020-05-04 RX ORDER — POTASSIUM CHLORIDE 1.5 G/1.77G
20 POWDER, FOR SOLUTION ORAL AS NEEDED
Status: DISCONTINUED | OUTPATIENT
Start: 2020-05-05 | End: 2020-05-08 | Stop reason: HOSPADM

## 2020-05-04 RX ORDER — POTASSIUM CHLORIDE 20 MEQ/1
20 TABLET, EXTENDED RELEASE ORAL AS NEEDED
Status: DISCONTINUED | OUTPATIENT
Start: 2020-05-05 | End: 2020-05-08 | Stop reason: HOSPADM

## 2020-05-04 RX ORDER — ONDANSETRON 2 MG/ML
4 INJECTION INTRAMUSCULAR; INTRAVENOUS EVERY 6 HOURS PRN
Status: DISCONTINUED | OUTPATIENT
Start: 2020-05-04 | End: 2020-05-08 | Stop reason: HOSPADM

## 2020-05-04 RX ORDER — POTASSIUM CHLORIDE 7.45 MG/ML
10 INJECTION INTRAVENOUS
Status: DISCONTINUED | OUTPATIENT
Start: 2020-05-04 | End: 2020-05-08 | Stop reason: HOSPADM

## 2020-05-04 RX ORDER — HYDROCODONE BITARTRATE AND ACETAMINOPHEN 5; 325 MG/1; MG/1
1 TABLET ORAL EVERY 4 HOURS PRN
Status: DISCONTINUED | OUTPATIENT
Start: 2020-05-04 | End: 2020-05-05

## 2020-05-04 RX ORDER — POLYETHYLENE GLYCOL 3350 17 G/17G
17 POWDER, FOR SOLUTION ORAL 2 TIMES DAILY
Status: DISCONTINUED | OUTPATIENT
Start: 2020-05-05 | End: 2020-05-08 | Stop reason: HOSPADM

## 2020-05-04 RX ORDER — NITROGLYCERIN 10 MG/100ML
INJECTION INTRAVENOUS CONTINUOUS PRN
Status: DISCONTINUED | OUTPATIENT
Start: 2020-05-04 | End: 2020-05-04 | Stop reason: SURG

## 2020-05-04 RX ORDER — ACETAMINOPHEN 325 MG/1
650 TABLET ORAL EVERY 6 HOURS
Status: COMPLETED | OUTPATIENT
Start: 2020-05-04 | End: 2020-05-05

## 2020-05-04 RX ORDER — ACETAMINOPHEN 650 MG
TABLET, EXTENDED RELEASE ORAL AS NEEDED
Status: DISCONTINUED | OUTPATIENT
Start: 2020-05-04 | End: 2020-05-04 | Stop reason: HOSPADM

## 2020-05-04 RX ORDER — ASPIRIN 81 MG/1
81 TABLET ORAL DAILY
Status: DISCONTINUED | OUTPATIENT
Start: 2020-05-05 | End: 2020-05-08 | Stop reason: HOSPADM

## 2020-05-04 RX ORDER — ACETAMINOPHEN 650 MG/1
650 SUPPOSITORY RECTAL EVERY 6 HOURS
Status: COMPLETED | OUTPATIENT
Start: 2020-05-04 | End: 2020-05-05

## 2020-05-04 RX ORDER — PANTOPRAZOLE SODIUM 40 MG/10ML
40 INJECTION, POWDER, LYOPHILIZED, FOR SOLUTION INTRAVENOUS ONCE
Status: COMPLETED | OUTPATIENT
Start: 2020-05-04 | End: 2020-05-04

## 2020-05-04 RX ORDER — POTASSIUM CHLORIDE 7.45 MG/ML
INJECTION INTRAVENOUS
Status: DISPENSED
Start: 2020-05-04 | End: 2020-05-04

## 2020-05-04 RX ORDER — FENTANYL CITRATE 50 UG/ML
INJECTION, SOLUTION INTRAMUSCULAR; INTRAVENOUS AS NEEDED
Status: DISCONTINUED | OUTPATIENT
Start: 2020-05-04 | End: 2020-05-04 | Stop reason: SURG

## 2020-05-04 RX ORDER — ALBUMIN, HUMAN INJ 5% 5 %
12.5 SOLUTION INTRAVENOUS AS NEEDED
Status: DISCONTINUED | OUTPATIENT
Start: 2020-05-04 | End: 2020-05-07

## 2020-05-04 RX ORDER — DEXMEDETOMIDINE HYDROCHLORIDE 4 UG/ML
.2-1.5 INJECTION, SOLUTION INTRAVENOUS
Status: DISCONTINUED | OUTPATIENT
Start: 2020-05-04 | End: 2020-05-07

## 2020-05-04 RX ORDER — CEFAZOLIN SODIUM 1 G/3ML
INJECTION, POWDER, FOR SOLUTION INTRAMUSCULAR; INTRAVENOUS AS NEEDED
Status: DISCONTINUED | OUTPATIENT
Start: 2020-05-04 | End: 2020-05-04 | Stop reason: SURG

## 2020-05-04 RX ORDER — NITROGLYCERIN 20 MG/100ML
5-50 INJECTION INTRAVENOUS CONTINUOUS PRN
Status: DISCONTINUED | OUTPATIENT
Start: 2020-05-04 | End: 2020-05-07

## 2020-05-04 RX ORDER — XYLITOL/YERBA SANTA
2 AEROSOL, SPRAY WITH PUMP (ML) MUCOUS MEMBRANE EVERY 4 HOURS PRN
Status: DISCONTINUED | OUTPATIENT
Start: 2020-05-04 | End: 2020-05-07

## 2020-05-04 RX ORDER — MAGNESIUM SULFATE 1 G/100ML
1 INJECTION INTRAVENOUS EVERY 8 HOURS
Status: COMPLETED | OUTPATIENT
Start: 2020-05-04 | End: 2020-05-05

## 2020-05-04 RX ORDER — AMIODARONE HCL/D5W 450 MG/250
0.5 PLASTIC BAG, INJECTION (ML) INTRAVENOUS CONTINUOUS
Status: DISCONTINUED | OUTPATIENT
Start: 2020-05-04 | End: 2020-05-04

## 2020-05-04 RX ORDER — MAGNESIUM HYDROXIDE 1200 MG/15ML
LIQUID ORAL AS NEEDED
Status: DISCONTINUED | OUTPATIENT
Start: 2020-05-04 | End: 2020-05-04 | Stop reason: HOSPADM

## 2020-05-04 RX ORDER — VECURONIUM BROMIDE 1 MG/ML
INJECTION, POWDER, LYOPHILIZED, FOR SOLUTION INTRAVENOUS AS NEEDED
Status: DISCONTINUED | OUTPATIENT
Start: 2020-05-04 | End: 2020-05-04 | Stop reason: SURG

## 2020-05-04 RX ORDER — SENNA PLUS 8.6 MG/1
1 TABLET ORAL 2 TIMES DAILY
Status: DISCONTINUED | OUTPATIENT
Start: 2020-05-05 | End: 2020-05-08 | Stop reason: HOSPADM

## 2020-05-04 RX ORDER — PHENYLEPHRINE HCL IN 0.9% NACL 0.5 MG/5ML
SYRINGE (ML) INTRAVENOUS AS NEEDED
Status: DISCONTINUED | OUTPATIENT
Start: 2020-05-04 | End: 2020-05-04 | Stop reason: SURG

## 2020-05-04 RX ORDER — AMIODARONE HCL/D5W 450 MG/250
0.5 PLASTIC BAG, INJECTION (ML) INTRAVENOUS CONTINUOUS
Status: DISCONTINUED | OUTPATIENT
Start: 2020-05-04 | End: 2020-05-04 | Stop reason: HOSPADM

## 2020-05-04 RX ORDER — ALBUMIN, HUMAN INJ 5% 5 %
SOLUTION INTRAVENOUS
Status: COMPLETED
Start: 2020-05-04 | End: 2020-05-04

## 2020-05-04 RX ORDER — CHLORHEXIDINE GLUCONATE 0.12 MG/ML
15 RINSE ORAL EVERY 12 HOURS
Status: DISCONTINUED | OUTPATIENT
Start: 2020-05-04 | End: 2020-05-07

## 2020-05-04 RX ORDER — NALOXONE HCL 0.4 MG/ML
0.4 VIAL (ML) INJECTION
Status: DISCONTINUED | OUTPATIENT
Start: 2020-05-04 | End: 2020-05-07

## 2020-05-04 RX ORDER — NITROGLYCERIN 5 MG/ML
INJECTION, SOLUTION INTRAVENOUS AS NEEDED
Status: DISCONTINUED | OUTPATIENT
Start: 2020-05-04 | End: 2020-05-04 | Stop reason: SURG

## 2020-05-04 RX ORDER — PANTOPRAZOLE SODIUM 40 MG/1
40 TABLET, DELAYED RELEASE ORAL
Status: DISCONTINUED | OUTPATIENT
Start: 2020-05-05 | End: 2020-05-08 | Stop reason: HOSPADM

## 2020-05-04 RX ORDER — MIDAZOLAM HYDROCHLORIDE 1 MG/ML
INJECTION INTRAMUSCULAR; INTRAVENOUS AS NEEDED
Status: DISCONTINUED | OUTPATIENT
Start: 2020-05-04 | End: 2020-05-04 | Stop reason: SURG

## 2020-05-04 RX ORDER — HEPARIN SODIUM 1000 [USP'U]/ML
INJECTION, SOLUTION INTRAVENOUS; SUBCUTANEOUS AS NEEDED
Status: DISCONTINUED | OUTPATIENT
Start: 2020-05-04 | End: 2020-05-04 | Stop reason: SURG

## 2020-05-04 RX ORDER — MORPHINE SULFATE 4 MG/ML
2 INJECTION, SOLUTION INTRAMUSCULAR; INTRAVENOUS
Status: DISCONTINUED | OUTPATIENT
Start: 2020-05-04 | End: 2020-05-07

## 2020-05-04 RX ORDER — ASPIRIN 325 MG
325 TABLET ORAL ONCE
Status: COMPLETED | OUTPATIENT
Start: 2020-05-04 | End: 2020-05-04

## 2020-05-04 RX ORDER — PROPOFOL 10 MG/ML
INJECTION, EMULSION INTRAVENOUS AS NEEDED
Status: DISCONTINUED | OUTPATIENT
Start: 2020-05-04 | End: 2020-05-04 | Stop reason: SURG

## 2020-05-04 RX ADMIN — CHLORHEXIDINE GLUCONATE 0.12% ORAL RINSE 15 ML: 1.2 LIQUID ORAL at 10:29

## 2020-05-04 RX ADMIN — CHLORHEXIDINE GLUCONATE 1 APPLICATION: 500 CLOTH TOPICAL at 06:05

## 2020-05-04 RX ADMIN — ALBUMIN HUMAN 12.5 G: 0.05 INJECTION, SOLUTION INTRAVENOUS at 18:52

## 2020-05-04 RX ADMIN — CEFAZOLIN SODIUM 2 G: 10 INJECTION, POWDER, FOR SOLUTION INTRAVENOUS at 22:23

## 2020-05-04 RX ADMIN — VECURONIUM BROMIDE 2 MG: 1 INJECTION, POWDER, LYOPHILIZED, FOR SOLUTION INTRAVENOUS at 11:35

## 2020-05-04 RX ADMIN — METOPROLOL TARTRATE 25 MG: 25 TABLET, FILM COATED ORAL at 09:00

## 2020-05-04 RX ADMIN — MIDAZOLAM HYDROCHLORIDE 2 MG: 1 INJECTION, SOLUTION INTRAMUSCULAR; INTRAVENOUS at 10:45

## 2020-05-04 RX ADMIN — PHENYLEPHRINE HYDROCHLORIDE 200 MCG: 10 INJECTION INTRAVENOUS at 11:12

## 2020-05-04 RX ADMIN — FENTANYL CITRATE 400 MCG: 50 INJECTION, SOLUTION INTRAMUSCULAR; INTRAVENOUS at 11:15

## 2020-05-04 RX ADMIN — MUPIROCIN 1 APPLICATION: 20 OINTMENT TOPICAL at 10:27

## 2020-05-04 RX ADMIN — FENTANYL CITRATE 250 MCG: 50 INJECTION, SOLUTION INTRAMUSCULAR; INTRAVENOUS at 13:40

## 2020-05-04 RX ADMIN — MUPIROCIN 1 APPLICATION: 20 OINTMENT TOPICAL at 20:01

## 2020-05-04 RX ADMIN — ASPIRIN 325 MG ORAL TABLET 325 MG: 325 PILL ORAL at 17:05

## 2020-05-04 RX ADMIN — FENTANYL CITRATE 250 MCG: 50 INJECTION, SOLUTION INTRAMUSCULAR; INTRAVENOUS at 14:12

## 2020-05-04 RX ADMIN — PANTOPRAZOLE SODIUM 40 MG: 40 INJECTION, POWDER, FOR SOLUTION INTRAVENOUS at 17:05

## 2020-05-04 RX ADMIN — NITROGLYCERIN 100 MCG: 5 INJECTION, SOLUTION INTRAVENOUS at 11:41

## 2020-05-04 RX ADMIN — VECURONIUM BROMIDE 4 MG: 1 INJECTION, POWDER, LYOPHILIZED, FOR SOLUTION INTRAVENOUS at 12:08

## 2020-05-04 RX ADMIN — PHENYLEPHRINE HYDROCHLORIDE 200 MCG: 10 INJECTION INTRAVENOUS at 11:06

## 2020-05-04 RX ADMIN — CEFAZOLIN SODIUM 2 G: 1 INJECTION, POWDER, FOR SOLUTION INTRAMUSCULAR; INTRAVENOUS at 13:57

## 2020-05-04 RX ADMIN — CEFAZOLIN SODIUM 2 G: 1 INJECTION, POWDER, FOR SOLUTION INTRAMUSCULAR; INTRAVENOUS at 11:15

## 2020-05-04 RX ADMIN — NITROGLYCERIN 50 MCG: 5 INJECTION, SOLUTION INTRAVENOUS at 11:44

## 2020-05-04 RX ADMIN — AMINOCAPROIC ACID 10 G: 250 INJECTION, SOLUTION INTRAVENOUS at 14:07

## 2020-05-04 RX ADMIN — PROTAMINE SULFATE 300 MG: 10 INJECTION, SOLUTION INTRAVENOUS at 13:58

## 2020-05-04 RX ADMIN — SODIUM CHLORIDE 0.5 MCG/KG/HR: 900 INJECTION INTRAVENOUS at 11:54

## 2020-05-04 RX ADMIN — VECURONIUM BROMIDE 3 MG: 1 INJECTION, POWDER, LYOPHILIZED, FOR SOLUTION INTRAVENOUS at 13:41

## 2020-05-04 RX ADMIN — MIDAZOLAM HYDROCHLORIDE 2 MG: 1 INJECTION, SOLUTION INTRAMUSCULAR; INTRAVENOUS at 10:47

## 2020-05-04 RX ADMIN — HEPARIN SODIUM 29000 UNITS: 1000 INJECTION, SOLUTION INTRAVENOUS; SUBCUTANEOUS at 12:36

## 2020-05-04 RX ADMIN — FENTANYL CITRATE 100 MCG: 50 INJECTION, SOLUTION INTRAMUSCULAR; INTRAVENOUS at 11:05

## 2020-05-04 RX ADMIN — AMINOCAPROIC ACID 10 G: 250 INJECTION, SOLUTION INTRAVENOUS at 11:37

## 2020-05-04 RX ADMIN — PROPOFOL 100 MG: 10 INJECTION, EMULSION INTRAVENOUS at 11:15

## 2020-05-04 RX ADMIN — NITROGLYCERIN 100 MCG: 5 INJECTION, SOLUTION INTRAVENOUS at 11:37

## 2020-05-04 RX ADMIN — VECURONIUM BROMIDE 4 MG: 1 INJECTION, POWDER, LYOPHILIZED, FOR SOLUTION INTRAVENOUS at 12:48

## 2020-05-04 RX ADMIN — MAGNESIUM SULFATE HEPTAHYDRATE 2 G: 500 INJECTION, SOLUTION INTRAMUSCULAR; INTRAVENOUS at 13:51

## 2020-05-04 RX ADMIN — MIDAZOLAM HYDROCHLORIDE 2 MG: 1 INJECTION, SOLUTION INTRAMUSCULAR; INTRAVENOUS at 13:41

## 2020-05-04 RX ADMIN — ALBUMIN HUMAN 12.5 G: 0.05 INJECTION, SOLUTION INTRAVENOUS at 23:58

## 2020-05-04 RX ADMIN — NITROGLYCERIN 25 MCG/KG/MIN: 10 INJECTION INTRAVENOUS at 12:05

## 2020-05-04 RX ADMIN — SODIUM CHLORIDE: 0.9 INJECTION, SOLUTION INTRAVENOUS at 10:44

## 2020-05-04 RX ADMIN — VECURONIUM BROMIDE 10 MG: 1 INJECTION, POWDER, LYOPHILIZED, FOR SOLUTION INTRAVENOUS at 11:15

## 2020-05-04 RX ADMIN — ACETAMINOPHEN 650 MG: 325 TABLET, FILM COATED ORAL at 17:05

## 2020-05-04 RX ADMIN — FENTANYL CITRATE 500 MCG: 50 INJECTION, SOLUTION INTRAMUSCULAR; INTRAVENOUS at 12:04

## 2020-05-04 RX ADMIN — ACETAMINOPHEN 650 MG: 325 TABLET, FILM COATED ORAL at 22:23

## 2020-05-04 RX ADMIN — CHLORHEXIDINE GLUCONATE 0.12% ORAL RINSE 15 ML: 1.2 LIQUID ORAL at 20:02

## 2020-05-04 RX ADMIN — MAGNESIUM SULFATE HEPTAHYDRATE 1 G: 1 INJECTION, SOLUTION INTRAVENOUS at 22:23

## 2020-05-04 NOTE — ANESTHESIA POSTPROCEDURE EVALUATION
Patient: Keshawn Lr    Procedure Summary     Date:  05/04/20 Room / Location:  [facility]OR 01 /  [facility]    Anesthesia Start:  1045 Anesthesia Stop:  1501    Procedures:       CORONARY ARTERY BYPASS WITH INTERNAL MAMMARY ARTERY GRAFT (N/A Chest)      TRANSESOPHAGEAL ECHOCARDIOGRAM WITH ANESTHESIA (N/A Chest) Diagnosis:       Coronary artery disease involving native coronary artery of native heart with unstable angina pectoris (CMS/HCC)      (Coronary artery disease involving native coronary artery of native heart with unstable angina pectoris (CMS/HCC) [I25.110])    Surgeon:  Yahir Ferris MD Provider:  Vega Pierson MD    Anesthesia Type:  general ASA Status:  4          Anesthesia Type: general    Vitals  Vitals Value Taken Time   BP     Temp     Pulse 87 5/4/2020  3:08 PM   Resp     SpO2 99 % 5/4/2020  3:08 PM   Vitals shown include unvalidated device data.        Post Anesthesia Care and Evaluation    Patient location during evaluation: ICU  Patient participation: complete - patient cannot participate  Level of consciousness: obtunded/minimal responses  Pain scale: See nurse's notes for pain score.  Pain management: adequate  Airway patency: patent  Anesthetic complications: No anesthetic complications  PONV Status: none  Cardiovascular status: acceptable  Respiratory status: acceptable  Hydration status: acceptable    Comments: Patient seen and examined postoperatively; vital signs stable; SpO2 greater than or equal to 90%; cardiopulmonary status stable; nausea/vomiting adequately controlled; pain adequately controlled; no apparent anesthesia complications; patient discharged from anesthesia care when discharge criteria were met

## 2020-05-04 NOTE — PROGRESS NOTES
"Cardiology Progress  Note      Patient Care Team:  Provider, No Known as PCP - General      PATIENT IDENTIFICATION    Name:@ Age: 70 y.o. Sex:@ : @ MRN:@    REASON FOR FOLLOW-UP:  Non-ST elevation MI  Significant obstructive coronary disease involving the RCA, left circumflex artery ramus branch and also diagonal system  Preserved LV function,        SUBJECTIVE    Patient seen immediately postoperatively.  Sedated but arousable.  Still on ventilator.  Had four-vessel CABG including DAWNA to RCA.      REVIEW OF SYSTEMS:  Review of systems could not be obtained due to   patient sedation status.    OBJECTIVE   In bed talking on telephone    ASSESSMENT/PLAN    Subendocardial MI first episode care (CMS/[facility])    Essential hypertension    Dyslipidemia    Coronary artery disease of native artery of native heart with stable angina pectoris (CMS/[facility])    Coronary artery disease involving native coronary artery of native heart with unstable angina pectoris (CMS/[facility])    Plan:  Routine postoperative care.  Wean ventilator and pressors as tolerated  Increase activity as tolerated        Vital Signs  Visit Vitals  /59   Pulse 56   Temp 98.2 °F (36.8 °C)   Resp (P) 16   Ht 188 cm (74.02\")   Wt 92.2 kg (203 lb 4.2 oz)   SpO2 99%   BMI 26.09 kg/m²     Oxygen Therapy  SpO2: 99 %  Pulse Oximetry Type: Continuous  Device (Oxygen Therapy): room air  Device (Oxygen Therapy): room air  Flowsheet Rows      First Filed Value   Admission Height  188 cm (74\") Documented at 2020 1403   Admission Weight  95.7 kg (210 lb 15.7 oz) Documented at 2020 1403        Intake & Output (last 3 days)        07 -  0700  07 -  0700  07 -  0700 / 07 -  0700    P.O.  480      I.V. (mL/kg)   41 (0.4)     Total Intake(mL/kg)  480 (5.1) 41 (0.4)     Urine (mL/kg/hr)  660 1075 (0.5)     Total Output  660 1075     Net  -180 -1034                 Lines, Drains & Airways    Active LDAs     Name:   " "Placement date:   Placement time:   Site:   Days:    Peripheral IV 05/02/20 1428 Right Antecubital   05/02/20 1428    Antecubital   1                       /59   Pulse 56   Temp 98.2 °F (36.8 °C)   Resp (P) 16   Ht 188 cm (74.02\")   Wt 92.2 kg (203 lb 4.2 oz)   SpO2 99%   BMI 26.09 kg/m²   Intake/Output last 3 shifts:  I/O last 3 completed shifts:  In: 281 [P.O.:240; I.V.:41]  Out: 1735 [Urine:1735]  Intake/Output this shift:  No intake/output data recorded.    PHYSICAL EXAM:    General: Sedated on ventilator  Head:  Normocephalic, atraumatic, mucous membranes moist  Eyes:  Conjunctiva/corneas clear, EOM's intact     Neck:  Lines  Lungs: Coarse and mechanical  Chest wall: No tenderness  Heart::  Regular rate and rhythm, S1 and S2 normal, no murmur, rub or gallop  Abdomen: Soft, non-tender, nondistended bowel sounds hypoactive  Extremities: Ace wraps bilateral lower extremities  Pulses: 2+ and symmetric all extremities  Skin:  No rashes or lesions  Neuro/psych: A&O x3. CN II through XII are grossly intact with appropriate affect      Scheduled Meds:        aspirin 81 mg Oral Daily   atorvastatin 40 mg Oral Nightly   ceFAZolin 2 g Intravenous Once   chlorhexidine 15 mL Mouth/Throat Q12H   lisinopril 20 mg Oral BID   metoprolol tartrate 25 mg Oral Q12H   mupirocin 1 application Each Nare Q12H   sodium chloride 10 mL Intravenous Q12H       Continuous Infusions:      nitroglycerin 5-200 mcg/min Last Rate: 15 mcg/min (05/04/20 0251)       PRN Meds:    •  acetaminophen **OR** acetaminophen **OR** acetaminophen  •  ALPRAZolam  •  aluminum-magnesium hydroxide-simethicone  •  atropine  •  bisacodyl  •  magnesium hydroxide  •  magnesium sulfate **OR** magnesium sulfate in D5W 1g/100mL (PREMIX)  •  melatonin  •  nitroglycerin  •  ondansetron **OR** ondansetron  •  potassium chloride  •  [COMPLETED] Insert peripheral IV **AND** sodium chloride  •  sodium chloride  •  sodium chloride  •  valACYclovir   "      Results Review:     I reviewed the patient's new clinical results.    CBC    Results from last 7 days   Lab Units 05/04/20  0330 05/03/20  1748 05/03/20  0548 05/02/20  1442   WBC 10*3/mm3 8.00  --  6.90 6.80   HEMOGLOBIN g/dL 14.2  --  14.3 15.8   HEMOGLOBIN, POC g/dL  --  15.0  --   --    PLATELETS 10*3/mm3 228  --  237 269     Cr Clearance Estimated Creatinine Clearance: 99.1 mL/min (by C-G formula based on SCr of 0.93 mg/dL).  Coag   Results from last 7 days   Lab Units 05/03/20  1217 05/03/20  0548 05/02/20  2206 05/02/20  1442   INR   --  1.06  --  1.00   APTT seconds 24.7* 42.3* 37.6* 25.2*     HbA1C No results found for: HGBA1C  Blood Glucose   Glucose   Date/Time Value Ref Range Status   05/03/2020 1748 118 (H) 74 - 100 mg/dL Final     Infection     CMP Results from last 7 days   Lab Units 05/04/20  0330 05/03/20  1334 05/03/20  0548 05/02/20  1442   SODIUM mmol/L  --  140 142 142   POTASSIUM mmol/L 4.0 4.2 4.7 4.0   CHLORIDE mmol/L  --  105 106 104   CO2 mmol/L  --  24.0 27.0 26.0   BUN mg/dL  --  11 14 17   CREATININE mg/dL  --  0.93 1.01 1.01   GLUCOSE mg/dL  --  121* 118* 108*   ALBUMIN g/dL  --  4.10  --   --    BILIRUBIN mg/dL  --  0.7  --   --    ALK PHOS U/L  --  82  --   --    AST (SGOT) U/L  --  35  --   --    ALT (SGPT) U/L  --  31  --   --      ABG  Results from last 7 days   Lab Units 05/03/20  1748   PH, ARTERIAL pH units 7.420   PCO2, ARTERIAL mm Hg 37.9   PO2 ART mm Hg 80.5*   O2 SATURATION ART % 96.1   BASE EXCESS ART mmol/L 0.3     UA  Results from last 7 days   Lab Units 05/03/20  1503   NITRITE UA  Negative   WBC UA /HPF None Seen   BACTERIA UA /HPF None Seen   SQUAM EPITHEL UA /HPF None Seen     LONNIE  No results found for: POCMETH, POCAMPHET, POCBARBITUR, POCBENZO, POCCOCAINE, POCOPIATES, POCOXYCODO, POCPHENCYC, POCPROPOXY, POCTHC, POCTRICYC  Lysis Labs Results from last 7 days   Lab Units 05/04/20  0330 05/03/20  1748 05/03/20  1334 05/03/20  1217 05/03/20  0548 05/02/20  2206  05/02/20  1442   INR   --   --   --   --  1.06  --  1.00   APTT seconds  --   --   --  24.7* 42.3* 37.6* 25.2*   HEMOGLOBIN g/dL 14.2  --   --   --  14.3  --  15.8   HEMOGLOBIN, POC g/dL  --  15.0  --   --   --   --   --    PLATELETS 10*3/mm3 228  --   --   --  237  --  269   CREATININE mg/dL  --   --  0.93  --  1.01  --  1.01     Radiology(recent) Xr Chest 1 View    Result Date: 5/2/2020  No active cardiopulmonary disease.   Electronically Signed By-Albino Mckinley DO. On:5/2/2020 3:02 PM This report was finalized on [id number] by  Albino Mckinley DO..        Results from last 7 days   Lab Units 05/03/20  0548   TROPONIN T ng/mL 0.400*       Xrays, labs reviewed personally by physician.    ECG/EMG Results (most recent)     Procedure Component Value Units Date/Time    ECG 12 Lead [[id number]] Collected:  05/02/20 1412     Updated:  05/03/20 1258    Narrative:       HEART RATE= 69  bpm  RR Interval= 868  ms  SC Interval= 210  ms  P Horizontal Axis= -8  deg  P Front Axis= 43  deg  QRSD Interval= 76  ms  QT Interval= 382  ms  QRS Axis= 9  deg  T Wave Axis= 25  deg  - NORMAL ECG -  Sinus rhythm  No previous ECG available for comparison  Electronically Signed By: Justin Toth ([facility]) 03-May-2020 12:58:00  Date and Time of Study: 2020-05-02 14:12:41            Medication Review:   I have reviewed the patient's current medication list  Scheduled Meds:  aspirin 81 mg Oral Daily   atorvastatin 40 mg Oral Nightly   ceFAZolin 2 g Intravenous Once   chlorhexidine 15 mL Mouth/Throat Q12H   lisinopril 20 mg Oral BID   metoprolol tartrate 25 mg Oral Q12H   mupirocin 1 application Each Nare Q12H   sodium chloride 10 mL Intravenous Q12H     Continuous Infusions:  nitroglycerin 5-200 mcg/min Last Rate: 15 mcg/min (05/04/20 0251)     PRN Meds:.•  acetaminophen **OR** acetaminophen **OR** acetaminophen  •  ALPRAZolam  •  aluminum-magnesium hydroxide-simethicone  •  atropine  •  bisacodyl  •  magnesium hydroxide  •  magnesium sulfate  "**OR** magnesium sulfate in D5W 1g/100mL (PREMIX)  •  melatonin  •  nitroglycerin  •  ondansetron **OR** ondansetron  •  potassium chloride  •  [COMPLETED] Insert peripheral IV **AND** sodium chloride  •  sodium chloride  •  sodium chloride  •  valACYclovir    Imaging:  Imaging Results (Last 72 Hours)     Procedure Component Value Units Date/Time    XR Chest 1 View [[id number]] Collected:  05/02/20 1502     Updated:  05/02/20 1504    Narrative:       XR CHEST 1 VW-     Date of Exam: 5/2/2020 2:56 PM     Indication: pain  70-year-old male with chest discomfort, shortness of  breath     Comparison: None available.     Technique: 1 view(s) of the chest were obtained.     FINDINGS: The lungs are well expanded. Cardiac, hilar and  mediastinal silhouettes are normal. No pneumothorax, pleural effusion or  focal pulmonary parenchymal opacity. Pulmonary vascularity is within  normal limits. The trachea is midline. Visualized bony structures are  intact.       Impression:       No active cardiopulmonary disease.        Electronically Signed By-Albino Mckinley DO. On:5/2/2020 3:02 PM  This report was finalized on [id number] by  Albino Mckinley DO..            EMMA Castillo  05/04/20  08:53       EMR Dragon/Transcription:   \"Dictated utilizing Dragon dictation\".           Electronically signed by EMMA Castillo, 05/04/20, 8:53 AM.    "

## 2020-05-04 NOTE — PLAN OF CARE
Problem: Patient Care Overview  Goal: Plan of Care Review  5/4/2020 0834 by Adali Morrison, PT  Outcome: Ongoing (interventions implemented as appropriate)  Flowsheets  Taken 5/3/2020 2000 by Crystal Fernandez RN  Plan of Care Reviewed With: patient  Taken 5/4/2020 0833 by Adali Morrison, PT  Outcome Summary: PT eval held today. Pt has cabg pending for 5/5/2020. PT did not enter room.

## 2020-05-04 NOTE — PROGRESS NOTES
Discharge Planning Assessment   John     Patient Name: Keshawn Lr  MRN: [id number]  Today's Date: 5/4/2020    Admit Date: 5/2/2020    Discharge Needs Assessment     Row Name 05/04/20 0922       Living Environment    Lives With  spouse    Current Living Arrangements  home/apartment/condo    Primary Care Provided by  self    Provides Primary Care For  no one    Family Caregiver if Needed  spouse;child(john), adult    Able to Return to Prior Arrangements  yes       Resource/Environmental Concerns    Resource/Environmental Concerns  none    Transportation Concerns  car, none       Transition Planning    Patient/Family Anticipates Transition to  home with family    Patient/Family Anticipated Services at Transition  none    Transportation Anticipated  family or friend will provide       Discharge Needs Assessment    Readmission Within the Last 30 Days  no previous admission in last 30 days    Concerns to be Addressed  -- Pending clinical course.     Equipment Currently Used at Home  none    Anticipated Changes Related to Illness  none    Equipment Needed After Discharge  -- Pending clinical course.     Discharge Coordination/Progress  Pending clinical course. Patient having open heart surgery today May 4.         Discharge Plan     Row Name 05/04/20 0924       Plan    Plan  Pending clinical course. Patient having open heart surgery today May 4. Anticipate routine home vs home health.     Patient/Family in Agreement with Plan  yes    Plan Comments   called patient and his wife, due to Covid 19 isolation. Patient has been independent. Patient drives and uses no DME at this time. Barriers to discharge: Open heart surgery scheduled for today 5/4.        Demographic Summary     Row Name 05/04/20 0922       General Information    Admission Type  inpatient    Arrived From  emergency department    Required Notices Provided  Important Message from Medicare    Referral Source  physician    Reason for Consult   discharge planning    Preferred Language  English     Used During This Interaction  no       Contact Information    Permission Granted to Share Info With          Functional Status     Row Name 05/04/20 0922       Functional Status    Usual Activity Tolerance  good    Current Activity Tolerance  good       Functional Status, IADL    Medications  independent    Meal Preparation  independent    Housekeeping  independent    Laundry  independent    Shopping  independent       Mental Status    General Appearance WDL  WDL       Mental Status Summary    Recent Changes in Mental Status/Cognitive Functioning  no changes       Employment/    Employment Status  retired          Anna [place] [address]   [phone]  office  [phone]  fax  [email or web address]

## 2020-05-04 NOTE — ANESTHESIA PROCEDURE NOTES
Airway  Urgency: elective    Date/Time: 5/4/2020 11:16 AM  Airway not difficult    General Information and Staff    Patient location during procedure: OR  Anesthesiologist: Neal Brennan MD    Indications and Patient Condition  Indications for airway management: airway protection    Preoxygenated: yes  MILS maintained throughout  Mask difficulty assessment: 1 - vent by mask    Final Airway Details  Final airway type: endotracheal airway      Successful airway: ETT  Cuffed: yes   Successful intubation technique: direct laryngoscopy  Endotracheal tube insertion site: oral  Blade: Grant  Blade size: 4  ETT size (mm): 8.0  Placement verified by: chest auscultation and capnometry   Measured from: gums  ETT/EBT to gums (cm): 23  Number of attempts at approach: 1  Assessment: lips, teeth, and gum same as pre-op and atraumatic intubation

## 2020-05-04 NOTE — NURSING NOTE
Patient scheduled for CABG today. Was able to speak with prior regarding cardiac rehab program. Brochure, exercise pamphlet, post op activity list given. Explained program. Would be interested, [place] location closer. Will follow up after surgery also.

## 2020-05-04 NOTE — ANESTHESIA PROCEDURE NOTES
Central Line      Patient reassessed immediately prior to procedure    Patient location during procedure: OR  Start time: 5/4/2020 11:28 AM  Stop Time:5/4/2020 11:30 AM  Indications: MD/Surgeon request and central pressure monitoring  Staff  Anesthesiologist: Neal Brennan MD  Preanesthetic Checklist  Completed: patient identified, site marked, surgical consent, pre-op evaluation, timeout performed, IV checked, risks and benefits discussed and monitors and equipment checked  Central Line Prep  Sterile Tech:cap, gloves, gown, mask and sterile barriers  Prep: chloraprep  Patient monitoring: blood pressure monitoring, continuous pulse oximetry and EKG  Central Line Procedure  Laterality:right  Location:internal jugular  Catheter Type:[place]-Anjana  Guidance:landmark technique and palpation techniqueImages: still images obtained, printed/placed on chart  Assessment  Post procedure:biopatch applied, line sutured and occlusive dressing applied  Assessement:blood return through all ports, free fluid flow and Ángel Test  Complications:no  Patient Tolerance:patient tolerated the procedure well with no apparent complications

## 2020-05-04 NOTE — ANESTHESIA PROCEDURE NOTES
Procedure Performed: Emergent/Open-Heart Anesthesia ALICE     Start Time:        End Time:      Preanesthesia Checklist:  Patient identified, IV assessed, risks and benefits discussed, monitors and equipment assessed, procedure being performed at surgeon's request and anesthesia consent obtained.    General Procedure Information  Diagnostic Indications for Echo:  assessment of surgical repair and hemodynamic monitoring  Location performed:  OR  Intubated  Bite block placed  Heart visualized  Probe Insertion:  Easy  Probe Type:  Biplane and multiplane  Modalities:  Color flow mapping, pulse wave Doppler and continuous wave Doppler    Echocardiographic and Doppler Measurements    Ventricles    Right Ventricle:  Cavity size normal.  Hypertrophy not present.  Thrombus not present.  Global function normal.  Ejection Fraction 60%.    Left Ventricle:  Cavity size normal.  Hypertrophy present.  Thrombus not present.  Global Function normal.  Ejection Fraction 60%.      Ventricular Regional Function:  1- Basal Anteroseptal:  normal  2- Basal Anterior:  normal  3- Basal Anterolateral:  normal  4- Basal Inferolateral:  normal  5- Basal Inferior:  normal  6- Basal Inferoseptal:  normal  7- Mid Anteroseptal:  normal  8- Mid Anterior:  normal  9- Mid Anterolateral:  normal  10- Mid Inferolateral:  normal  11- Mid Inferior:  normal  12- Mid Inferoseptal:  normal  13- Apical Anterior:  normal  14- Apical Lateral:  normal  15- Apical Inferior:  normal  16- Apical Septal:  normal  17- [place]:  normal      Valves    Aortic Valve:  Annulus normal.  Stenosis not present.  Regurgitation trace.  Leaflets normal.  Leaflet motions normal.      Mitral Valve:  Annulus normal.  Stenosis not present.  Regurgitation trace.  Leaflets normal.  Leaflet motions normal.      Tricuspid Valve:  Annulus normal.  Stenosis not present.  Regurgitation absent.  Leaflets normal.  Leaflet motions normal.    Pulmonic Valve:  Annulus normal.  Stenosis not present.   Regurgitation absent.          Aorta    Ascending Aorta:  Size normal.  Dissection not present.  Plaque thickness less than 3 mm.  Mobile plaque not present.    Aortic Arch:  Size normal.  Dissection not present.  Plaque thickness less than 3 mm.  Mobile plaque not present.    Descending Aorta:  Size normal.  Dissection not present.  Plaque thickness less than 3 mm.  Mobile plaque not present.          Atria    Right Atrium:  Size normal.    Left Atrium:  Size normal.  Spontaneous echo contrast not present.  Thrombus not present.  Tumor not present.  Device not present.    Left atrial appendage normal.      Septa    Atrial Septum:  Intra-atrial septal morphology normal.      Ventricular Septum:  Intra-ventricular septum morphology normal.          Other Findings  Pleural Effusion:  none  Pulmonary Arteries:  normal      Anesthesia Information  Performed Personally  Anesthesiologist:  Vega Pierson MD      Echocardiogram Comments:       Post cpb no change

## 2020-05-04 NOTE — ANESTHESIA PROCEDURE NOTES
Arterial Line      Patient reassessed immediately prior to procedure    Patient location during procedure: OR  Start time: 5/4/2020 10:46 AM  Stop Time:5/4/2020 10:58 AM       Line placed for hemodynamic monitoring.  Performed By   Anesthesiologist: Neal Brennan MD  Preanesthetic Checklist  Completed: patient identified, site marked, surgical consent, pre-op evaluation, timeout performed, IV checked, risks and benefits discussed and monitors and equipment checked  Arterial Line Prep   Sterile Tech: cap, gloves, gown, mask and sterile barriers  Prep: ChloraPrep  Patient monitoring: blood pressure monitoring, continuous pulse oximetry and EKG  Arterial Line Procedure   Laterality:left  Location:  radial artery  Catheter size: 20 G   Guidance: landmark technique and palpation technique  Number of attempts: 1  Successful placement: yes  Post Assessment   Dressing Type: secured with tape and wrist guard applied.   Complications no  Circ/Move/Sens Assessment: normal.   Patient Tolerance: patient tolerated the procedure well with no apparent complications

## 2020-05-04 NOTE — ANESTHESIA PREPROCEDURE EVALUATION
Anesthesia Evaluation                  Airway   Mallampati: I  TM distance: >3 FB  Neck ROM: full  No difficulty expected  Dental - normal exam     Pulmonary - normal exam   Cardiovascular - normal exam        Neuro/Psych  GI/Hepatic/Renal/Endo      Musculoskeletal     Abdominal  - normal exam    Bowel sounds: normal.   Substance History      OB/GYN          Other                        Anesthesia Plan    ASA 4     general     intravenous induction   Postoperative Plan: Expected vent after surgery  Anesthetic plan, all risks, benefits, and alternatives have been provided, discussed and informed consent has been obtained with: patient.

## 2020-05-04 NOTE — ANESTHESIA PROCEDURE NOTES
Central Line      Patient reassessed immediately prior to procedure    Patient location during procedure: OR  Start time: 5/4/2020 11:18 AM  Stop Time:5/4/2020 11:25 AM  Indications: vascular access and MD/Surgeon request  Staff  Anesthesiologist: Neal Brennan MD  Preanesthetic Checklist  Completed: patient identified, site marked, surgical consent, pre-op evaluation, timeout performed, IV checked, risks and benefits discussed and monitors and equipment checked  Central Line Prep  Sterile Tech:cap, gloves, gown, mask and sterile barriers  Prep: chloraprep  Patient monitoring: blood pressure monitoring, continuous pulse oximetry and EKG  Central Line Procedure  Laterality:right  Location:internal jugular  Catheter Type:triple lumen and Cordis  Catheter Size:8.5 Fr  Guidance:landmark technique and palpation technique  Assessment  Post procedure:biopatch applied, line sutured and occlusive dressing applied  Assessement:blood return through all ports, free fluid flow and Ángel Test  Complications:no  Patient Tolerance:patient tolerated the procedure well with no apparent complications

## 2020-05-05 ENCOUNTER — APPOINTMENT (OUTPATIENT)
Dept: GENERAL RADIOLOGY | Facility: HOSPITAL | Age: 70
End: 2020-05-05

## 2020-05-05 LAB
ALBUMIN SERPL-MCNC: 4.2 G/DL (ref 3.5–5.2)
ANION GAP SERPL CALCULATED.3IONS-SCNC: 14 MMOL/L (ref 5–15)
ARTERIAL PATENCY WRIST A: ABNORMAL
ARTERIAL PATENCY WRIST A: ABNORMAL
ATMOSPHERIC PRESS: ABNORMAL MM[HG]
ATMOSPHERIC PRESS: ABNORMAL MM[HG]
BASE EXCESS BLDA CALC-SCNC: -1.8 MMOL/L (ref 0–3)
BASE EXCESS BLDA CALC-SCNC: -2.4 MMOL/L (ref 0–3)
BASOPHILS # BLD AUTO: 0.1 10*3/MM3 (ref 0–0.2)
BASOPHILS NFR BLD AUTO: 1 % (ref 0–1.5)
BDY SITE: ABNORMAL
BDY SITE: ABNORMAL
BUN BLD-MCNC: 22 MG/DL (ref 8–23)
BUN/CREAT SERPL: 21.4 (ref 7–25)
CA-I BLDA-SCNC: 0.87 MMOL/L (ref 1.15–1.33)
CA-I BLDA-SCNC: 0.9 MMOL/L (ref 1.15–1.33)
CA-I BLDA-SCNC: 1.03 MMOL/L (ref 1.15–1.33)
CA-I SERPL ISE-MCNC: 1.16 MMOL/L (ref 1.2–1.3)
CALCIUM SPEC-SCNC: 8.1 MG/DL (ref 8.6–10.5)
CHLORIDE SERPL-SCNC: 111 MMOL/L (ref 98–107)
CO2 BLDA-SCNC: 24.1 MMOL/L (ref 22–29)
CO2 BLDA-SCNC: 24.3 MMOL/L (ref 22–29)
CO2 SERPL-SCNC: 20 MMOL/L (ref 22–29)
CREAT BLD-MCNC: 1.03 MG/DL (ref 0.76–1.27)
D-LACTATE SERPL-SCNC: 0.9 MMOL/L (ref 0.5–2)
DEPRECATED RDW RBC AUTO: 45.9 FL (ref 37–54)
EOSINOPHIL # BLD AUTO: 0 10*3/MM3 (ref 0–0.4)
EOSINOPHIL NFR BLD AUTO: 0 % (ref 0.3–6.2)
ERYTHROCYTE [DISTWIDTH] IN BLOOD BY AUTOMATED COUNT: 13.9 % (ref 12.3–15.4)
GFR SERPL CREATININE-BSD FRML MDRD: 72 ML/MIN/1.73
GLUCOSE BLD-MCNC: 134 MG/DL (ref 65–99)
GLUCOSE BLDC GLUCOMTR-MCNC: 101 MG/DL (ref 70–105)
GLUCOSE BLDC GLUCOMTR-MCNC: 107 MG/DL (ref 70–105)
GLUCOSE BLDC GLUCOMTR-MCNC: 113 MG/DL (ref 70–105)
GLUCOSE BLDC GLUCOMTR-MCNC: 113 MG/DL (ref 70–105)
GLUCOSE BLDC GLUCOMTR-MCNC: 115 MG/DL (ref 70–105)
GLUCOSE BLDC GLUCOMTR-MCNC: 121 MG/DL (ref 70–105)
GLUCOSE BLDC GLUCOMTR-MCNC: 122 MG/DL (ref 70–105)
GLUCOSE BLDC GLUCOMTR-MCNC: 125 MG/DL (ref 70–105)
GLUCOSE BLDC GLUCOMTR-MCNC: 127 MG/DL (ref 70–105)
GLUCOSE BLDC GLUCOMTR-MCNC: 134 MG/DL (ref 74–100)
GLUCOSE BLDC GLUCOMTR-MCNC: 135 MG/DL (ref 74–100)
GLUCOSE BLDC GLUCOMTR-MCNC: 136 MG/DL (ref 70–105)
GLUCOSE BLDC GLUCOMTR-MCNC: 136 MG/DL (ref 74–100)
GLUCOSE BLDC GLUCOMTR-MCNC: 140 MG/DL (ref 70–105)
GLUCOSE BLDC GLUCOMTR-MCNC: 141 MG/DL (ref 70–105)
GLUCOSE BLDC GLUCOMTR-MCNC: 144 MG/DL (ref 70–105)
HCO3 BLDA-SCNC: 23 MMOL/L (ref 21–28)
HCO3 BLDA-SCNC: 23.1 MMOL/L (ref 21–28)
HCT VFR BLD AUTO: 35.1 % (ref 37.5–51)
HCT VFR BLDA CALC: 36 % (ref 38–51)
HCT VFR BLDA CALC: 36 % (ref 38–51)
HCT VFR BLDA CALC: 38 % (ref 38–51)
HEMODILUTION: NO
HEMODILUTION: YES
HGB BLD-MCNC: 12.2 G/DL (ref 13–17.7)
HGB BLDA-MCNC: 12.2 G/DL (ref 12–17)
HGB BLDA-MCNC: 12.3 G/DL (ref 12–17)
HGB BLDA-MCNC: 12.8 G/DL (ref 12–17)
HOROWITZ INDEX BLD+IHG-RTO: 40 %
HOROWITZ INDEX BLD+IHG-RTO: 40 %
INR PPP: 1.13 (ref 0.9–1.1)
LYMPHOCYTES # BLD AUTO: 0.3 10*3/MM3 (ref 0.7–3.1)
LYMPHOCYTES NFR BLD AUTO: 2.8 % (ref 19.6–45.3)
MAGNESIUM SERPL-MCNC: 2.6 MG/DL (ref 1.6–2.4)
MCH RBC QN AUTO: 32.7 PG (ref 26.6–33)
MCHC RBC AUTO-ENTMCNC: 34.7 G/DL (ref 31.5–35.7)
MCV RBC AUTO: 94.5 FL (ref 79–97)
MODALITY: ABNORMAL
MODALITY: ABNORMAL
MONOCYTES # BLD AUTO: 0.9 10*3/MM3 (ref 0.1–0.9)
MONOCYTES NFR BLD AUTO: 8 % (ref 5–12)
NEUTROPHILS # BLD AUTO: 9.7 10*3/MM3 (ref 1.7–7)
NEUTROPHILS NFR BLD AUTO: 88.2 % (ref 42.7–76)
NRBC BLD AUTO-RTO: 0 /100 WBC (ref 0–0.2)
PCO2 BLDA: 38.2 MM HG (ref 35–48)
PCO2 BLDA: 41.2 MM HG (ref 35–48)
PEEP RESPIRATORY: 5 CM[H2O]
PEEP RESPIRATORY: 5 CM[H2O]
PH BLDA: 7.36 PH UNITS (ref 7.35–7.45)
PH BLDA: 7.39 PH UNITS (ref 7.35–7.45)
PHOSPHATE SERPL-MCNC: 4.2 MG/DL (ref 2.5–4.5)
PLATELET # BLD AUTO: 166 10*3/MM3 (ref 140–450)
PMV BLD AUTO: 8.9 FL (ref 6–12)
PO2 BLDA: 112.5 MM HG (ref 83–108)
PO2 BLDA: 119.7 MM HG (ref 83–108)
POTASSIUM BLD-SCNC: 4.1 MMOL/L (ref 3.5–5.2)
POTASSIUM BLDA-SCNC: 3.8 MMOL/L (ref 3.5–4.5)
POTASSIUM BLDA-SCNC: 3.9 MMOL/L (ref 3.5–4.5)
POTASSIUM BLDA-SCNC: 4.3 MMOL/L (ref 3.5–4.5)
PROTHROMBIN TIME: 11.6 SECONDS (ref 9.6–11.7)
PSV: 8 CMH2O
RBC # BLD AUTO: 3.71 10*6/MM3 (ref 4.14–5.8)
RESPIRATORY RATE: 14
SAO2 % BLDCOA: 98.2 % (ref 94–98)
SAO2 % BLDCOA: 98.6 % (ref 94–98)
SODIUM BLD-SCNC: 145 MMOL/L (ref 136–145)
SODIUM BLDA-SCNC: 140 MMOL/L (ref 138–146)
SODIUM BLDA-SCNC: 144 MMOL/L (ref 138–146)
SODIUM BLDA-SCNC: 145 MMOL/L (ref 138–146)
VENTILATOR MODE: ABNORMAL
VENTILATOR MODE: ABNORMAL
VT ON VENT VENT: 750 ML
WBC NRBC COR # BLD: 11 10*3/MM3 (ref 3.4–10.8)

## 2020-05-05 PROCEDURE — 85025 COMPLETE CBC W/AUTO DIFF WBC: CPT | Performed by: NURSE PRACTITIONER

## 2020-05-05 PROCEDURE — 82803 BLOOD GASES ANY COMBINATION: CPT

## 2020-05-05 PROCEDURE — 99024 POSTOP FOLLOW-UP VISIT: CPT | Performed by: NURSE PRACTITIONER

## 2020-05-05 PROCEDURE — 83605 ASSAY OF LACTIC ACID: CPT

## 2020-05-05 PROCEDURE — 94003 VENT MGMT INPAT SUBQ DAY: CPT

## 2020-05-05 PROCEDURE — 82962 GLUCOSE BLOOD TEST: CPT

## 2020-05-05 PROCEDURE — 25010000002 MAGNESIUM SULFATE IN D5W 1G/100ML (PREMIX) 1-5 GM/100ML-% SOLUTION: Performed by: NURSE PRACTITIONER

## 2020-05-05 PROCEDURE — 82330 ASSAY OF CALCIUM: CPT | Performed by: NURSE PRACTITIONER

## 2020-05-05 PROCEDURE — 85610 PROTHROMBIN TIME: CPT | Performed by: NURSE PRACTITIONER

## 2020-05-05 PROCEDURE — 94799 UNLISTED PULMONARY SVC/PX: CPT

## 2020-05-05 PROCEDURE — P9041 ALBUMIN (HUMAN),5%, 50ML: HCPCS | Performed by: NURSE PRACTITIONER

## 2020-05-05 PROCEDURE — 97162 PT EVAL MOD COMPLEX 30 MIN: CPT

## 2020-05-05 PROCEDURE — 93005 ELECTROCARDIOGRAM TRACING: CPT | Performed by: NURSE PRACTITIONER

## 2020-05-05 PROCEDURE — 85018 HEMOGLOBIN: CPT

## 2020-05-05 PROCEDURE — 25010000002 CALCIUM GLUCONATE 2-0.675 GM/100ML-% SOLUTION: Performed by: NURSE PRACTITIONER

## 2020-05-05 PROCEDURE — 80051 ELECTROLYTE PANEL: CPT

## 2020-05-05 PROCEDURE — 93010 ELECTROCARDIOGRAM REPORT: CPT | Performed by: INTERNAL MEDICINE

## 2020-05-05 PROCEDURE — 71045 X-RAY EXAM CHEST 1 VIEW: CPT

## 2020-05-05 PROCEDURE — 97165 OT EVAL LOW COMPLEX 30 MIN: CPT

## 2020-05-05 PROCEDURE — 25010000002 FUROSEMIDE PER 20 MG: Performed by: NURSE PRACTITIONER

## 2020-05-05 PROCEDURE — 99232 SBSQ HOSP IP/OBS MODERATE 35: CPT | Performed by: INTERNAL MEDICINE

## 2020-05-05 PROCEDURE — 25010000002 CEFAZOLIN PER 500 MG: Performed by: NURSE PRACTITIONER

## 2020-05-05 PROCEDURE — 97116 GAIT TRAINING THERAPY: CPT

## 2020-05-05 PROCEDURE — 82330 ASSAY OF CALCIUM: CPT

## 2020-05-05 PROCEDURE — 97530 THERAPEUTIC ACTIVITIES: CPT

## 2020-05-05 PROCEDURE — 83735 ASSAY OF MAGNESIUM: CPT | Performed by: NURSE PRACTITIONER

## 2020-05-05 PROCEDURE — 25010000002 ONDANSETRON PER 1 MG: Performed by: NURSE PRACTITIONER

## 2020-05-05 PROCEDURE — 80069 RENAL FUNCTION PANEL: CPT | Performed by: NURSE PRACTITIONER

## 2020-05-05 PROCEDURE — 25010000002 MORPHINE PER 10 MG: Performed by: NURSE PRACTITIONER

## 2020-05-05 PROCEDURE — 25010000002 ALBUMIN HUMAN 5% PER 50 ML: Performed by: NURSE PRACTITIONER

## 2020-05-05 RX ORDER — HYDROCODONE BITARTRATE AND ACETAMINOPHEN 5; 325 MG/1; MG/1
2 TABLET ORAL EVERY 4 HOURS PRN
Status: DISCONTINUED | OUTPATIENT
Start: 2020-05-05 | End: 2020-05-08 | Stop reason: HOSPADM

## 2020-05-05 RX ORDER — CALCIUM GLUCONATE 20 MG/ML
2 INJECTION, SOLUTION INTRAVENOUS ONCE
Status: COMPLETED | OUTPATIENT
Start: 2020-05-05 | End: 2020-05-05

## 2020-05-05 RX ORDER — HYDROCODONE BITARTRATE AND ACETAMINOPHEN 5; 325 MG/1; MG/1
1 TABLET ORAL EVERY 4 HOURS PRN
Status: DISCONTINUED | OUTPATIENT
Start: 2020-05-05 | End: 2020-05-08 | Stop reason: HOSPADM

## 2020-05-05 RX ORDER — FUROSEMIDE 10 MG/ML
20 INJECTION INTRAMUSCULAR; INTRAVENOUS ONCE
Status: COMPLETED | OUTPATIENT
Start: 2020-05-05 | End: 2020-05-05

## 2020-05-05 RX ADMIN — MORPHINE SULFATE 2 MG: 4 INJECTION INTRAVENOUS at 23:14

## 2020-05-05 RX ADMIN — MUPIROCIN 1 APPLICATION: 20 OINTMENT TOPICAL at 23:14

## 2020-05-05 RX ADMIN — ALBUMIN HUMAN 12.5 G: 0.05 INJECTION, SOLUTION INTRAVENOUS at 03:13

## 2020-05-05 RX ADMIN — HYDROCODONE BITARTRATE AND ACETAMINOPHEN 2 TABLET: 5; 325 TABLET ORAL at 17:49

## 2020-05-05 RX ADMIN — CHLORHEXIDINE GLUCONATE 0.12% ORAL RINSE 15 ML: 1.2 LIQUID ORAL at 08:37

## 2020-05-05 RX ADMIN — PANTOPRAZOLE SODIUM 40 MG: 40 TABLET, DELAYED RELEASE ORAL at 05:27

## 2020-05-05 RX ADMIN — CEFAZOLIN SODIUM 2 G: 10 INJECTION, POWDER, FOR SOLUTION INTRAVENOUS at 05:28

## 2020-05-05 RX ADMIN — ATORVASTATIN CALCIUM 40 MG: 40 TABLET, FILM COATED ORAL at 21:32

## 2020-05-05 RX ADMIN — HYDROCODONE BITARTRATE AND ACETAMINOPHEN 1 TABLET: 5; 325 TABLET ORAL at 05:28

## 2020-05-05 RX ADMIN — MAGNESIUM SULFATE HEPTAHYDRATE 1 G: 1 INJECTION, SOLUTION INTRAVENOUS at 13:45

## 2020-05-05 RX ADMIN — CALCIUM GLUCONATE 2 G: 20 INJECTION, SOLUTION INTRAVENOUS at 09:41

## 2020-05-05 RX ADMIN — MAGNESIUM SULFATE HEPTAHYDRATE 1 G: 1 INJECTION, SOLUTION INTRAVENOUS at 05:28

## 2020-05-05 RX ADMIN — ACETAMINOPHEN 650 MG: 325 TABLET, FILM COATED ORAL at 05:28

## 2020-05-05 RX ADMIN — SENNOSIDES 1 TABLET: 8.6 TABLET, FILM COATED ORAL at 21:32

## 2020-05-05 RX ADMIN — CEFAZOLIN SODIUM 2 G: 10 INJECTION, POWDER, FOR SOLUTION INTRAVENOUS at 13:46

## 2020-05-05 RX ADMIN — MUPIROCIN 1 APPLICATION: 20 OINTMENT TOPICAL at 08:37

## 2020-05-05 RX ADMIN — HYDROCODONE BITARTRATE AND ACETAMINOPHEN 1 TABLET: 5; 325 TABLET ORAL at 13:46

## 2020-05-05 RX ADMIN — ONDANSETRON 4 MG: 2 INJECTION INTRAMUSCULAR; INTRAVENOUS at 23:13

## 2020-05-05 RX ADMIN — POLYETHYLENE GLYCOL 3350 17 G: 17 POWDER, FOR SOLUTION ORAL at 08:37

## 2020-05-05 RX ADMIN — SENNOSIDES 1 TABLET: 8.6 TABLET, FILM COATED ORAL at 08:37

## 2020-05-05 RX ADMIN — HYDROCODONE BITARTRATE AND ACETAMINOPHEN 2 TABLET: 5; 325 TABLET ORAL at 21:32

## 2020-05-05 RX ADMIN — ACETAMINOPHEN 650 MG: 325 TABLET, FILM COATED ORAL at 11:06

## 2020-05-05 RX ADMIN — CHLORHEXIDINE GLUCONATE 0.12% ORAL RINSE 15 ML: 1.2 LIQUID ORAL at 21:32

## 2020-05-05 RX ADMIN — HYDROCODONE BITARTRATE AND ACETAMINOPHEN 1 TABLET: 5; 325 TABLET ORAL at 09:41

## 2020-05-05 RX ADMIN — ASPIRIN 81 MG: 81 TABLET, COATED ORAL at 08:37

## 2020-05-05 RX ADMIN — CEFAZOLIN SODIUM 2 G: 10 INJECTION, POWDER, FOR SOLUTION INTRAVENOUS at 21:32

## 2020-05-05 RX ADMIN — FUROSEMIDE 20 MG: 10 INJECTION, SOLUTION INTRAMUSCULAR; INTRAVENOUS at 16:54

## 2020-05-05 NOTE — PLAN OF CARE
Problem: Patient Care Overview  Goal: Plan of Care Review  Outcome: Ongoing (interventions implemented as appropriate)  Flowsheets (Taken 5/5/2020 1236)  Progress: improving  Plan of Care Reviewed With: patient  Outcome Summary: Pt is 71 yo male s/p CABG x4 on 5/4/2020.  Pt able to complete transfers with CGA and verbal cues for sternal precautions.  Pt able to ambulate 150 ft with RW, CGA, and assist for equipment.  Pt with c/o lightheadedness and SOA with ambulation, however O2 >90% and blood pressure WNL at end of tx session.  It is anticipated with removal of more lines pt will progress with functional mobility to return home with wife.  PT will follow 3x/week while at [facility] and assess gait without AD and stair training.  PPE donned: mask with faceshield, gloves.

## 2020-05-05 NOTE — THERAPY EVALUATION
Patient Name: Keshawn Lr  : 1950    MRN: [id number]                              Today's Date: 2020       Admit Date: 2020    Visit Dx:     ICD-10-CM ICD-9-CM   1. Subendocardial MI first episode care (CMS/HCC) I21.4 410.71   2. Coronary artery disease involving native coronary artery of native heart with unstable angina pectoris (CMS/HCC) I25.110 414.01     411.1     Patient Active Problem List   Diagnosis   • Subendocardial MI first episode care (CMS/[facility])   • Essential hypertension   • Dyslipidemia   • Coronary artery disease of native artery of native heart with stable angina pectoris (CMS/[facility])   • Coronary artery disease involving native coronary artery of native heart with unstable angina pectoris (CMS/[facility])     Past Medical History:   Diagnosis Date   • Hyperlipidemia    • Hypertension      Past Surgical History:   Procedure Laterality Date   • FINGER SURGERY     • UVULECTOMY       General Information     Row Name 20 1231          PT Evaluation Time/Intention    Document Type  evaluation  -HC     Mode of Treatment  physical therapy  -     Row Name 20 1231          General Information    Patient Profile Reviewed?  yes  -HC     Prior Level of Function  independent:  -HC     Existing Precautions/Restrictions  sternal  -HC     Row Name 20 1231          Resource/Environmental Concerns    Current Living Arrangements  home/apartment/condo  -     Row Name 20 1231          Home Main Entrance    Number of Stairs, Main Entrance  one  -HC     Row Name 20 1231          Cognitive Assessment/Intervention- PT/OT    Orientation Status (Cognition)  oriented x 4  -HC     Row Name 20 1231          Safety Issues, Functional Mobility    Impairments Affecting Function (Mobility)  endurance/activity tolerance;shortness of breath;strength;balance  -HC       User Key  (r) = Recorded By, (t) = Taken By, (c) = Cosigned By    Initials Name Provider Type    HC Aura Odom, PT  Physical Therapist        Mobility     Row Name 05/05/20 1231          Bed Mobility Assessment/Treatment    Bed Mobility Assessment/Treatment  sit-supine  -     Sit-Supine Berry (Bed Mobility)  2 person assist;moderate assist (50% patient effort)  -     Row Name 05/05/20 1231          Bed-Chair Transfer    Bed-Chair Berry (Transfers)  not tested  -     Row Name 05/05/20 1231          Sit-Stand Transfer    Sit-Stand Berry (Transfers)  contact guard  -[facility] Name 05/05/20 1231          Gait/Stairs Assessment/Training    Gait/Stairs Assessment/Training  gait/ambulation independence;gait/ambulation assistive device  -     Berry Level (Gait)  contact guard;1 person to manage equipment  -     Assistive Device (Gait)  walker, front-wheeled  -     Distance in Feet (Gait)  150 ft  -     Pattern (Gait)  step-through  -     Comment (Gait/Stairs)  two standing rest breaks due to lightheadedness or SOA however O2 >90% on room air, Cardiac Level III  -       User Key  (r) = Recorded By, (t) = Taken By, (c) = Cosigned By    Initials Name Provider Type    HC Aura Odom, PT Physical Therapist        Obj/Interventions     Row Name 05/05/20 1232          General ROM    GENERAL ROM COMMENTS  BLEs WFL  -     Row Name 05/05/20 1232          MMT (Manual Muscle Testing)    General MMT Comments  [facility] WFL  -[facility] Name 05/05/20 1232          Static Sitting Balance    Level of Berry (Unsupported Sitting, Static Balance)  supervision  -     Sitting Position (Unsupported Sitting, Static Balance)  sitting on edge of bed  -     Time Able to Maintain Position (Unsupported Sitting, Static Balance)  2 to 3 minutes  -     Row Name 05/05/20 1232          Dynamic Sitting Balance    Level of Berry, Reaches Outside Midline (Sitting, Dynamic Balance)  supervision  -     Sitting Position, Reaches Outside Midline (Sitting, Dynamic Balance)  sitting on edge of bed  -      Row Name 05/05/20 1232          Static Standing Balance    Level of [place] (Supported Standing, Static Balance)  standby assist  -HC     Time Able to Maintain Position (Supported Standing, Static Balance)  2 to 3 minutes  -HC     Assistive Device Utilized (Supported Standing, Static Balance)  walker, rolling  -HC     Row Name 05/05/20 1232          Dynamic Standing Balance    Level of [place], Reaches Outside Midline (Standing, Dynamic Balance)  contact guard assist  -HC     Time Able to Maintain Position, Reaches Outside Midline (Standing, Dynamic Balance)  more than 5 minutes 7 minutes  -HC     Assistive Device Utilized (Supported Standing, Dynamic Balance)  walker, rolling  -HC       User Key  (r) = Recorded By, (t) = Taken By, (c) = Cosigned By    Initials Name Provider Type    HC Aura Odom, PT Physical Therapist        Goals/Plan     Row Name 05/05/20 1235          Transfer Goal 1 (PT)    Activity/Assistive Device (Transfer Goal 1, PT)  transfers, all  -HC     [place]aughter Level/Cues Needed (Transfer Goal 1, PT)  independent  -HC     Time Frame (Transfer Goal 1, PT)  2 weeks  -HC     Row Name 05/05/20 1235          Gait Training Goal 1 (PT)    Activity/Assistive Device (Gait Training Goal 1, PT)  gait (walking locomotion)  -HC     [place] Level (Gait Training Goal 1, PT)  independent  -HC     Distance (Gait Goal 1, PT)  400 ft  -HC     Time Frame (Gait Training Goal 1, PT)  2 weeks  -HC     Row Name 05/05/20 1235          Stairs Goal 1 (PT)    Activity/Assistive Device (Stairs Goal 1, PT)  stairs, all skills  -HC     [place] Level/Cues Needed (Stairs Goal 1, PT)  standby assist  -HC     Number of Stairs (Stairs Goal 1, PT)  5  -HC     Time Frame (Stairs Goal 1, PT)  2 weeks  -HC       User Key  (r) = Recorded By, (t) = Taken By, (c) = Cosigned By    Initials Name Provider Type    Aura Traore, PT Physical Therapist        Clinical Impression     Row Name 05/05/20 1232           Pain Assessment    Additional Documentation  Pain Scale: Numbers Pre/Post-Treatment (Group)  -HC     Row Name 05/05/20 1233          Pain Scale: Numbers Pre/Post-Treatment    Pain Scale: Numbers, Pretreatment  2/10  -HC     Pain Scale: Numbers, Post-Treatment  5/10  -HC     Pre/Post Treatment Pain Comment  increased pain with lying in bed, RN present and aware   -     Pain Intervention(s)  Repositioned  -     Row Name 05/05/20 1233          Physical Therapy Clinical Impression    Patient/Family Goals Statement (PT Clinical Impression)  Pt is 71 yo male s/p CABG x4 on 5/4/2020.    -     Criteria for Skilled Interventions Met (PT Clinical Impression)  yes;treatment indicated  -     Rehab Potential (PT Clinical Summary)  good, to achieve stated therapy goals  -     Predicted Duration of Therapy (PT)  2 weeks   -     Row Name 05/05/20 1233          Vital Signs    Post Systolic BP Rehab  134  -HC     Post Treatment Diastolic BP  82  -HC     Posttreatment Heart Rate (beats/min)  81  -HC     Post SpO2 (%)  96  -HC     O2 Delivery Post Treatment  room air  -     Row Name 05/05/20 1233          Positioning and Restraints    Pre-Treatment Position  sitting in chair/recliner  -HC     Post Treatment Position  bed  -HC     In Bed  notified nsg;call light within reach;encouraged to call for assist;with nsg  -HC       User Key  (r) = Recorded By, (t) = Taken By, (c) = Cosigned By    Initials Name Provider Type    HC Aura Odom, PT Physical Therapist        Outcome Measures    No documentation.         PT Recommendation and Plan  Planned Therapy Interventions (PT Eval): balance training, gait training, postural re-education, transfer training, home exercise program, neuromuscular re-education, patient/family education, strengthening, stair training, bed mobility training  Outcome Summary/Treatment Plan (PT)  Anticipated Discharge Disposition (PT): home with assist  Plan of Care Reviewed With:  patient  Progress: improving  Outcome Summary: Pt is 71 yo male s/p CABG x4 on 5/4/2020.  Pt able to complete transfers with CGA and verbal cues for sternal precautions.  Pt able to ambulate 150 ft with RW, CGA, and assist for equipment.  Pt with c/o lightheadedness and SOA with ambulation, however O2 >90% and blood pressure WNL at end of tx session.  It is anticipated with removal of more lines pt will progress with functional mobility to return home with wife.  PT will follow 3x/week while at [facility] and assess gait without AD and stair training.  PPE donned: mask with faceshield, gloves.     Time Calculation:   PT Charges     Row Name 05/05/20 1238             Time Calculation    Start Time  1116  -      Stop Time  1143  -      Time Calculation (min)  27 min  -      PT Received On  05/05/20  -      PT - Next Appointment  05/06/20  -      PT Goal Re-Cert Due Date  05/19/20  -         Time Calculation- PT    Total Timed Code Minutes- PT  10 minute(s)  -        User Key  (r) = Recorded By, (t) = Taken By, (c) = Cosigned By    Initials Name Provider Type     Aura Odom, PT Physical Therapist        Therapy Charges for Today     Code Description Service Date Service Provider Modifiers Qty    [id number]  PT EVAL MOD COMPLEXITY 2 5/5/2020 Aura Odom, PT GP 1    [id number]  GAIT TRAINING EA 15 MIN 5/5/2020 Aura Odom, PT GP 1               Aura Odom PT  5/5/2020

## 2020-05-05 NOTE — OP NOTE
Operative Note    Date of Dictation: 05/04/20    Date of Procedure: 05/04/20    Referring Physician: David Hall M.D.    Preoperative diagnosis:   1.  Multivessel coronary artery disease  2.  Unstable angina  3.  Dyspnea with exertion    Postoperative diagnosis:   Same    Procedure:   1.  Urgent CABG x 4 with a DAWNA to the distal RCA and individual saphenous vein graft to the first diagonal, ramus intermedius obtuse marginal 1  2. EVH of the both legs converted to open on the left thigh due to poor vein    Surgeon: Yahir Ferris MD     Assistants: LISANDRO La    Anesthesia: General endotracheal anesthesia and ALICE    Findings:  The saphenous vein was harvested endoscopically form the both  leg. The vein had a diameter of 3 to 3.8 mm depending on the area and sent pieces were #4 bypass with prompted as an open left upper leg harvest.  We were able to obtain 3 pieces of vein which were usable for bypass.  The LIMA was not used because the LAD had no disease. the coronaries had diameters between 1.25 and 2.5 mm.    Estimated Blood Loss: Approximately 800 cc and he was returned to the patient using the Cell Saver device    STS Data:    The patient was explained the risks (STS risk score calculated), benefits and alternatives of surgery and agreed to proceed. The antibiotics and b blockers were given in the STS required window.        Description of the procedure:     The patient was placed supine on the operative table. General anesthesia was given and lines placed. The patient was prepped and draped using the usual sterile technique. A median sternotomy was performed with a scalpel and the layers carried down to the sternum using the electrocautery. The sternum was split in the midline using a vertical oscillating saw. Hemostasis was achieved. The DAWNA was harvested as a pedicle and prepared with papaverine. It was of good quality. 300 units/kg of IV heparin was given to an ACT over 400. FELICIA Glynn  retractor was placed and the mediastinum exposed, the pericardium was opened and the edges tacked to the wound. Cannulation sutures were placed in the ascending aorta and right atrium. Small cannulas were placed and aorto right atrial cardiopulmonary bypass was started. Cardioplegia cannulas were placed and then the ascending aorta was clamped. One liter of cold blood cardioplegia was given in an antegrade fashion to achieved diastolic arrest and further doses every 15 minutes thereafter. Constructions of grafts were done in the sequence distal - proximal between the reversed saphenous vein graft and each coronary targets. Grafts were constructed to the OM1, ramus intermedious and First diagonal coronary arteries and plegia was given between graft sequences after de-airing the aortic root and tying the proximal anastomosis. The DAWNA was anastomosed to the distal RCA using 7.0 prolene continuous suture with a small needle, then the warm dose of cardioplegia was given and then the aortic clamp removed as well as the DAWNA bulldog clamp. All anastomoses were checked and had good flow and morphology. The right pleural space was suctioned and the lungs ventilated. The heart was paced till regular atrial rhythm resumed. I allowed the heart to eject and once hemodynamics were acceptable, then the CPB was discontinued and the venous and cardioplegia cannulas removed. The matching dose of protamine was given and the aortic cannula removed as well. AV temporary wires and pleural and mediastinal chest tubes were placed and the wound sprayed with platelet rich plasma.  The sternum was closed with single and double wires and soft tissue in layers of reabsorbable material. The wounds were covered with sterile dressings.    CPB time: 71 minutes    Aortic clamp time: 60 minutes    Complications:  none           Disposition: Cardiovascular recovery room           Condition: Critical but stable.

## 2020-05-05 NOTE — PROGRESS NOTES
"Cardiology Progress  Note      Patient Care Team:  Provider, No Known as PCP - General      PATIENT IDENTIFICATION    Name:@ Age: 70 y.o. Sex:@ : @ MRN:@    REASON FOR FOLLOW-UP:  Non-ST elevation MI  Significant obstructive coronary disease involving the RCA, left circumflex artery ramus branch and also diagonal system status post CABG 2020  Preserved LV function        SUBJECTIVE    Patient reports sternal wound discomfort.  He denies any other complaints.  He reports he took a short walk today.  Sat at the bedside today as well.  Chest tubes out.  Still paced rhythm.  Becomes hypotensive with intrinsic rhythm.    REVIEW OF SYSTEMS:  Pertinent items are noted in HPI, all other systems reviewed and negative    OBJECTIVE   Sitting in chair watching television  Rhythm sinus-66  Blood pressure stable 118/74  Drips: Insulin      ASSESSMENT/PLAN    Subendocardial MI first episode care (CMS/HCC)    Essential hypertension    Dyslipidemia    Coronary artery disease of native artery of native heart with stable angina pectoris (CMS/HCC)    Coronary artery disease involving native coronary artery of native heart with unstable angina pectoris (CMS/[facility])    Plan:  Continue CTS postop protocol  Continue CV supportive care  Mobility as tolerated  Continue to monitor rhythm and hemodynamics closely  Further recommendations based on patient hospital course        Vital Signs  Visit Vitals  BP (!) 89/53   Pulse 61   Temp 97.9 °F (36.6 °C)   Resp (P) 16   Ht 188 cm (74.02\")   Wt 93.3 kg (205 lb 11 oz)   SpO2 99%   BMI 26.40 kg/m²     Oxygen Therapy  SpO2: 99 %  Pulse Oximetry Type: Continuous  Device (Oxygen Therapy): room air  Device (Oxygen Therapy): nasal cannula  Flow (L/min): 4  Flowsheet Rows      First Filed Value   Admission Height  188 cm (74\") Documented at 2020 1403   Admission Weight  95.7 kg (210 lb 15.7 oz) Documented at 2020 1403        Intake & Output (last 3 days)        0701 -  0700 0503 " "0701 - 05/04 0700 05/04 0701 - 05/05 0700 05/05 0701 - 05/06 0700    P.O. 480  120     I.V. (mL/kg)  41 (0.4) 2875 (30.8)     Blood   500     IV Piggyback   250     Total Intake(mL/kg) 480 (5.1) 41 (0.4) 3745 (40.1)     Urine (mL/kg/hr) 660 1075 (0.5) 2447 (1.1) 150 (0.3)    Other   1600     Blood   600     Chest Tube   340     Total Output 660 1075 4987 150    Net -180 -1034 -1242 -150                Lines, Drains & Airways    Active LDAs     Name:   Placement date:   Placement time:   Site:   Days:    Peripheral IV 05/02/20 1428 Right Antecubital   05/02/20 1428    Antecubital   1                       BP (!) 89/53   Pulse 61   Temp 97.9 °F (36.6 °C)   Resp (P) 16   Ht 188 cm (74.02\")   Wt 93.3 kg (205 lb 11 oz)   SpO2 99%   BMI 26.40 kg/m²   Intake/Output last 3 shifts:  I/O last 3 completed shifts:  In: 3786 [P.O.:120; I.V.:2916; Blood:500; IV Piggyback:250]  Out: 5662 [Urine:3122; Other:1600; Blood:600; Chest Tube:340]  Intake/Output this shift:  I/O this shift:  In: -   Out: 150 [Urine:150]    PHYSICAL EXAM:    General: Alert, cooperative, no distress, appears stated age  Head:  Normocephalic, atraumatic, mucous membranes moist  Eyes:  Conjunctiva/corneas clear, EOM's intact     Neck:  Lines  Lungs: Coarse and diminished  Chest wall: Sternal wound clean and dry  Heart::  Regular rate and rhythm, S1 and S2 normal, no murmur, rub or gallop  Abdomen: Soft, non-tender, nondistended bowel sounds active  Extremities: Ace wraps to both legs  Pulses: 2+ and symmetric all extremities  Skin:  No rashes or lesions  Neuro/psych: A&O x3. CN II through XII are grossly intact with appropriate affect      Scheduled Meds:        aspirin 81 mg Oral Daily   atorvastatin 40 mg Oral Nightly   ceFAZolin 2 g Intravenous Q8H   chlorhexidine 15 mL Mouth/Throat Q12H   [START ON 5/6/2020] enoxaparin 40 mg Subcutaneous Daily   magnesium sulfate 1 g Intravenous Q8H   mupirocin 1 application Each Nare BID   pantoprazole 40 mg " Oral Q AM   polyethylene glycol 17 g Oral BID   senna 1 tablet Oral BID       Continuous Infusions:      dexmedetomidine 0.2-1.5 mcg/kg/hr Last Rate: Stopped (05/04/20 1545)   insulin 0-50 Units/hr Last Rate: 0.9 Units/hr (05/05/20 1109)   nitroglycerin 5-50 mcg/min Last Rate: Stopped (05/04/20 1750)       PRN Meds:    •  albumin human  •  HYDROcodone-acetaminophen  •  insulin  •  Morphine **AND** naloxone  •  Mouth Kote  •  nitroglycerin  •  ondansetron  •  potassium chloride **OR** potassium chloride  •  potassium chloride **OR** potassium chloride        Results Review:     I reviewed the patient's new clinical results.    CBC    Results from last 7 days   Lab Units 05/05/20  0303 05/05/20  0207 05/05/20  0117 05/04/20  2255 05/04/20  2027 05/04/20  1834 05/04/20  1719  05/04/20  1506  05/04/20  0330  05/03/20  0548 05/02/20  1442   WBC 10*3/mm3 11.00*  --   --   --   --   --   --   --  11.60*  --  8.00  --  6.90 6.80   HEMOGLOBIN g/dL 12.2*  --   --   --   --   --   --   --  12.0*  --  14.2  --  14.3 15.8   HEMOGLOBIN, POC g/dL  --  12.8 12.2 12.5 12.5 12.4 12.3   < >  --    < >  --    < >  --   --    PLATELETS 10*3/mm3 166  --   --   --   --   --   --   --  153  --  228  --  237 269    < > = values in this interval not displayed.     Cr Clearance Estimated Creatinine Clearance: 90.6 mL/min (by C-G formula based on SCr of 1.03 mg/dL).  Coag   Results from last 7 days   Lab Units 05/05/20  0303 05/04/20  1506 05/03/20  1217 05/03/20  0548 05/02/20  2206 05/02/20  1442   INR  1.13* 1.18*  --  1.06  --  1.00   APTT seconds  --  26.9 24.7* 42.3* 37.6* 25.2*     HbA1C   Lab Results   Component Value Date    HGBA1C 5.5 05/03/2020     Blood Glucose   Glucose   Date/Time Value Ref Range Status   05/05/2020 1055 101 70 - 105 mg/dL Final     Comment:     Serial Number: [id number]:  [id number]   05/05/2020 0751 115 (H) 70 - 105 mg/dL Final     Comment:     Serial Number: [id number]:  [id number]   05/05/2020  0603 113 (H) 70 - 105 mg/dL Final     Comment:     Serial Number: [id number]:  [id number]   05/05/2020 0301 125 (H) 70 - 105 mg/dL Final     Comment:     Serial Number: [id number]:  [id number]   05/05/2020 0207 135 (H) 74 - 100 mg/dL Final   05/05/2020 0117 136 (H) 74 - 100 mg/dL Final   05/05/2020 0014 141 (H) 70 - 105 mg/dL Final     Comment:     Serial Number: [id number]:  [id number]   05/04/2020 2304 147 (H) 70 - 105 mg/dL Final     Comment:     Serial Number: [id number]:  [id number]     Infection     CMP   Results from last 7 days   Lab Units 05/05/20  0303 05/04/20  1506 05/04/20  0330 05/03/20  1334 05/03/20  0548 05/02/20  1442   SODIUM mmol/L 145 140  --  140 142 142   POTASSIUM mmol/L 4.1 4.5 4.0 4.2 4.7 4.0   CHLORIDE mmol/L 111* 108*  --  105 106 104   CO2 mmol/L 20.0* 21.0*  --  24.0 27.0 26.0   BUN mg/dL 22 18  --  11 14 17   CREATININE mg/dL 1.03 1.26  --  0.93 1.01 1.01   GLUCOSE mg/dL 134* 78  --  121* 118* 108*   ALBUMIN g/dL 4.20 4.10  --  4.10  --   --    BILIRUBIN mg/dL  --   --   --  0.7  --   --    ALK PHOS U/L  --   --   --  82  --   --    AST (SGOT) U/L  --   --   --  35  --   --    ALT (SGPT) U/L  --   --   --  31  --   --      ABG    Results from last 7 days   Lab Units 05/05/20  0207 05/05/20  0117 05/04/20  2255 05/04/20 2027 05/04/20  1834 05/04/20  1719 05/04/20  1629   PH, ARTERIAL pH units 7.356 7.387 7.302* 7.301* 7.368 7.360 7.353   PCO2, ARTERIAL mm Hg 41.2 38.2 45.0 49.6* 41.7 40.0 41.6   PO2 ART mm Hg 112.5* 119.7* 128.9* 125.1* 98.5 98.8 139.5*   O2 SATURATION ART % 98.2* 98.6* 98.6* 98.4* 97.4 97.4 99.0*   BASE EXCESS ART mmol/L -2.4* -1.8* -4.2* -2.4* -1.4* -2.7* -2.4*     UA    Results from last 7 days   Lab Units 05/03/20  1503   NITRITE UA  Negative   WBC UA /HPF None Seen   BACTERIA UA /HPF None Seen   SQUAM EPITHEL UA /HPF None Seen     LONNIE  No results found for: POCMETH, POCAMPHET, POCBARBITUR, POCBENZO, POCCOCAINE, POCOPIATES, POCOXYCODO,  POCPHENCYC, POCPROPOXY, POCTHC, POCTRICYC  Lysis Labs   Results from last 7 days   Lab Units 05/05/20  0303 05/05/20  0207 05/05/20  0117 05/04/20  2255 05/04/20 2027 05/04/20  1834 05/04/20  1719  05/04/20  1506  05/04/20  0330  05/03/20  1334 05/03/20  1217 05/03/20  0548 05/02/20  2206 05/02/20  1442   INR  1.13*  --   --   --   --   --   --   --  1.18*  --   --   --   --   --  1.06  --  1.00   APTT seconds  --   --   --   --   --   --   --   --  26.9  --   --   --   --  24.7* 42.3* 37.6* 25.2*   HEMOGLOBIN g/dL 12.2*  --   --   --   --   --   --   --  12.0*  --  14.2  --   --   --  14.3  --  15.8   HEMOGLOBIN, POC g/dL  --  12.8 12.2 12.5 12.5 12.4 12.3   < >  --    < >  --    < >  --   --   --   --   --    PLATELETS 10*3/mm3 166  --   --   --   --   --   --   --  153  --  228  --   --   --  237  --  269   CREATININE mg/dL 1.03  --   --   --   --   --   --   --  1.26  --   --   --  0.93  --  1.01  --  1.01    < > = values in this interval not displayed.     Radiology(recent) Xr Chest 1 View    Result Date: 5/5/2020   1. ET tube and NG tube have been removed. 2. Additional tubes and lines as above. 3. Minor bibasilar atelectasis with small left effusion.  Electronically Signed By-Hero Kumar On:5/5/2020 8:38 AM This report was finalized on [id number] by  Hero Kumar, .    Xr Chest 1 View    Result Date: 5/4/2020   1. Interval CABG procedure. 2. The lines and support tubes are in good position as described. 3. No pneumothorax. 4. Mild right basilar subsegmental atelectasis with suggestion of a tiny pleural effusion.  Electronically Signed By-Arya Casper On:5/4/2020 4:33 PM This report was finalized on [id number] by  Arya Casper, .        Results from last 7 days   Lab Units 05/03/20  0548   TROPONIN T ng/mL 0.400*       Xrays, labs reviewed personally by physician.    ECG/EMG Results (most recent)     Procedure Component Value Units Date/Time    ECG 12 Lead [[id number]] Collected:  05/02/20 1412     Updated:   05/03/20 1258    Narrative:       HEART RATE= 69  bpm  RR Interval= 868  ms  AR Interval= 210  ms  P Horizontal Axis= -8  deg  P Front Axis= 43  deg  QRSD Interval= 76  ms  QT Interval= 382  ms  QRS Axis= 9  deg  T Wave Axis= 25  deg  - NORMAL ECG -  Sinus rhythm  No previous ECG available for comparison  Electronically Signed By: Justin Toth (TONI) 03-May-2020 12:58:00  Date and Time of Study: 2020-05-02 14:12:41    ECG 12 Lead [[id number]] Collected:  05/04/20 1541     Updated:  05/04/20 1542    Narrative:       HEART RATE= 46  bpm  RR Interval= 1296  ms  AR Interval= 232  ms  P Horizontal Axis= 46  deg  P Front Axis= 73  deg  QRSD Interval= 90  ms  QT Interval= 466  ms  QRS Axis= 52  deg  T Wave Axis= 0  deg  - ABNORMAL ECG -  Sinus bradycardia  Prolonged AR interval  Electronically Signed By:   Date and Time of Study: 2020-05-04 15:41:37    ECG 12 Lead [[id number]] Collected:  05/05/20 0525     Updated:  05/05/20 0526    Narrative:       HEART RATE= 52  bpm  RR Interval= 1168  ms  AR Interval= 247  ms  P Horizontal Axis= 44  deg  P Front Axis= 24  deg  QRSD Interval= 81  ms  QT Interval= 406  ms  QRS Axis= 49  deg  T Wave Axis= 51  deg  - ABNORMAL ECG -  Sinus bradycardia  Atrial premature complex  Prolonged AR interval  ST elevation suggests acute pericarditis  Electronically Signed By:   Date and Time of Study: 2020-05-05 05:25:00            Medication Review:   I have reviewed the patient's current medication list  Scheduled Meds:    aspirin 81 mg Oral Daily   atorvastatin 40 mg Oral Nightly   ceFAZolin 2 g Intravenous Q8H   chlorhexidine 15 mL Mouth/Throat Q12H   [START ON 5/6/2020] enoxaparin 40 mg Subcutaneous Daily   magnesium sulfate 1 g Intravenous Q8H   mupirocin 1 application Each Nare BID   pantoprazole 40 mg Oral Q AM   polyethylene glycol 17 g Oral BID   senna 1 tablet Oral BID     Continuous Infusions:    dexmedetomidine 0.2-1.5 mcg/kg/hr Last Rate: Stopped (05/04/20 1545)   insulin 0-50 Units/hr  Last Rate: 0.9 Units/hr (05/05/20 1109)   nitroglycerin 5-50 mcg/min Last Rate: Stopped (05/04/20 1750)     PRN Meds:.•  albumin human  •  HYDROcodone-acetaminophen  •  insulin  •  Morphine **AND** naloxone  •  Mouth Kote  •  nitroglycerin  •  ondansetron  •  potassium chloride **OR** potassium chloride  •  potassium chloride **OR** potassium chloride    Imaging:  Imaging Results (Last 72 Hours)     Procedure Component Value Units Date/Time    XR Chest 1 View [[id number]] Collected:  05/05/20 0834     Updated:  05/05/20 0840    Narrative:       DATE OF EXAM:  5/5/2020 5:56 AM     PROCEDURE:  XR CHEST 1 VW-     INDICATIONS:  Post-Op Heart Surgery; I21.4-Non-ST elevation (NSTEMI) myocardial  infarction; I25.110-Atherosclerotic heart disease of native coronary  artery with unstable angina pectoris     COMPARISON: 05/04/2020 TECHNIQUE:   Single radiographic view of the chest was obtained.     FINDINGS:  Sternotomy wires again overlie the midline. ET tube and NG tube have  been removed. Right IJ introducer sheath and [place]-Anjana catheter again  noted with the tip projecting over the pulmonary outflow. Left basilar  and midline chest tubes and right basilar chest tube again noted. No  pneumothorax. Pulmonary vascularity is within normal limits. There is a  small left pleural effusion. Bibasilar linear opacities suggesting minor  atelectasis. Heart size and mediastinal contour appear stable. Probable  calcified right paratracheal lymph node. There are degenerative changes  in the shoulders and spine.       Impression:          1. ET tube and NG tube have been removed.  2. Additional tubes and lines as above.  3. Minor bibasilar atelectasis with small left effusion.     Electronically Signed By-Hero Kumar On:5/5/2020 8:38 AM  This report was finalized on [id number] by  Hero Kumar, .    XR Chest 1 View [[id number]] Collected:  05/04/20 1631     Updated:  05/04/20 1635    Narrative:       DATE OF EXAM:  5/4/2020 4:13 PM      PROCEDURE:  XR CHEST 1 VW-     INDICATIONS:  Myocardial infarction, status post a CABG procedure, postoperative  follow-up.      COMPARISON:  05/02/2020.     TECHNIQUE:   Single radiographic view of the chest was obtained.     FINDINGS:  The patient's had an interval CABG procedure. There are 2 mediastinal  chest tubes in place as well as a right-sided chest tube. There is no  pneumothorax. The endotracheal tube tip is in good position terminating  at the level of the aortic knob. The [place]-Anjana catheter terminates in  the main pulmonary artery segment. There is a nasogastric tube in place  with its tip terminating in the fundus of the stomach.  The pulmonary  vascular markings are normal. There are patchy linear densities in the  right lung base consistent with subsegmental atelectasis. There may be a  tiny right pleural effusion.       Impression:          1. Interval CABG procedure.  2. The lines and support tubes are in good position as described.  3. No pneumothorax.  4. Mild right basilar subsegmental atelectasis with suggestion of a tiny  pleural effusion.     Electronically Signed By-Arya Casper On:5/4/2020 4:33 PM  This report was finalized on [id number] by  Arya Casper, .    XR Chest 1 View [[id number]] Collected:  05/02/20 1502     Updated:  05/02/20 1504    Narrative:       XR CHEST 1 VW-     Date of Exam: 5/2/2020 2:56 PM     Indication: pain  70-year-old male with chest discomfort, shortness of  breath     Comparison: None available.     Technique: 1 view(s) of the chest were obtained.     FINDINGS: The lungs are well expanded. Cardiac, hilar and  mediastinal silhouettes are normal. No pneumothorax, pleural effusion or  focal pulmonary parenchymal opacity. Pulmonary vascularity is within  normal limits. The trachea is midline. Visualized bony structures are  intact.       Impression:       No active cardiopulmonary disease.        Electronically Signed By-Albino Mckinley DO. On:5/2/2020 3:02 PM  This  "report was finalized on [id number] by  Albino Mckinley DO..            EMMA Castillo  05/05/20  11:37       EMR Dragon/Transcription:   \"Dictated utilizing Dragon dictation\".           Electronically signed by EMMA Castillo, 05/05/20, 11:37 AM.    "

## 2020-05-05 NOTE — PROGRESS NOTES
S/P POD# 1 CABG with DAWNA--[place]  EF 55-60% (cath)    Subjective:  C/o mid sternal soreness    Extubated, no events overnight  Wt down 2 kgs from preop  Drips:  Insulin  CVP 14      Intake/Output Summary (Last 24 hours) at 5/5/2020 1109  Last data filed at 5/5/2020 0600  Gross per 24 hour   Intake 3745 ml   Output 4987 ml   Net -1242 ml     Temp:  [93.9 °F (34.4 °C)-98.2 °F (36.8 °C)] 97.9 °F (36.6 °C)  Heart Rate:  [55-99] 61  BP: ()/(50-87) 89/53  Arterial Line BP: ()/(51-95) 136/63  FiO2 (%):  [40 %-70 %] 40 %  RPCT 27/8  /8    Results from last 7 days   Lab Units 05/05/20  0303 05/05/20  0207  05/04/20  1506  05/03/20  0548   WBC 10*3/mm3 11.00*  --   --  11.60*   < > 6.90   HEMOGLOBIN g/dL 12.2*  --   --  12.0*   < > 14.3   HEMOGLOBIN, POC g/dL  --  12.8   < >  --    < >  --    HEMATOCRIT % 35.1*  --   --  35.3*   < > 41.3   HEMATOCRIT POC %  --  38   < >  --    < >  --    PLATELETS 10*3/mm3 166  --   --  153   < > 237   INR  1.13*  --   --  1.18*  --  1.06    < > = values in this interval not displayed.     Results from last 7 days   Lab Units 05/05/20  0303   CREATININE mg/dL 1.03   POTASSIUM mmol/L 4.1   SODIUM mmol/L 145   MAGNESIUM mg/dL 2.6*   PHOSPHORUS mg/dL 4.2     ica 1.16    Physical Exam:  Neuro intact, nad, up in chair  Tele:  AAI 80, underlying SR 60  Diminished bases, 2L 99%  dsg c/d/i, no drainage  Benign abd, no BM  No edema    Assessment/Plan:  Principal Problem:    Subendocardial MI first episode care (CMS/[facility])  Active Problems:    Essential hypertension    Dyslipidemia    Coronary artery disease of native artery of native heart with stable angina pectoris (CMS/HCC)    Coronary artery disease involving native coronary artery of native heart with unstable angina pectoris (CMS/HCC)    MV CAD,  EF 55-60%--s/p POD#1 CABG--doing well, add asa/statin, hold bb for now--soft BP  NSTEMI  HTN--stable  HLD--statin    Routine care--d/c nita Mcguire  D/w wife via  phone  De-escal  Anticipate home at discharge    Grecia Isaacs, APRN  5/5/2020  11:09

## 2020-05-05 NOTE — PLAN OF CARE
Problem: Patient Care Overview  Goal: Plan of Care Review  Outcome: Ongoing (interventions implemented as appropriate)  Flowsheets  Taken 5/5/2020 1236 by Aura Odom, PT  Progress: improving  Plan of Care Reviewed With: patient  Taken 5/5/2020 1400 by Cheo Barajas, OT  Outcome Summary: Pt is 71 yo male s/p CABG x4 on 5/4/2020. Pt able to complete transfers with CGA and verbal cues for sternal precautions. Pt. requires increased ADL support max A for LBD seocndary to multiple lines, reports I/ADL [place] at home. Pt. requires CGA for mobility w/ rolling walker support and increased assist for bed mobility lifting BLEs into bed. Anticipate progress to home w/ wife support, will continue to follow up w/ pt. daily at [facility] to assess and monitor pt. progress.  Note:   PPE: gloves, mask, face shield

## 2020-05-06 ENCOUNTER — APPOINTMENT (OUTPATIENT)
Dept: GENERAL RADIOLOGY | Facility: HOSPITAL | Age: 70
End: 2020-05-06

## 2020-05-06 LAB
ANION GAP SERPL CALCULATED.3IONS-SCNC: 12 MMOL/L (ref 5–15)
BUN BLD-MCNC: 23 MG/DL (ref 8–23)
BUN/CREAT SERPL: 26.4 (ref 7–25)
CALCIUM SPEC-SCNC: 8.4 MG/DL (ref 8.6–10.5)
CHLORIDE SERPL-SCNC: 104 MMOL/L (ref 98–107)
CO2 SERPL-SCNC: 23 MMOL/L (ref 22–29)
CREAT BLD-MCNC: 0.87 MG/DL (ref 0.76–1.27)
DEPRECATED RDW RBC AUTO: 47.7 FL (ref 37–54)
ERYTHROCYTE [DISTWIDTH] IN BLOOD BY AUTOMATED COUNT: 14.4 % (ref 12.3–15.4)
GFR SERPL CREATININE-BSD FRML MDRD: 87 ML/MIN/1.73
GLUCOSE BLD-MCNC: 146 MG/DL (ref 65–99)
GLUCOSE BLDC GLUCOMTR-MCNC: 105 MG/DL (ref 70–105)
GLUCOSE BLDC GLUCOMTR-MCNC: 115 MG/DL (ref 70–105)
GLUCOSE BLDC GLUCOMTR-MCNC: 127 MG/DL (ref 70–105)
GLUCOSE BLDC GLUCOMTR-MCNC: 132 MG/DL (ref 70–105)
GLUCOSE BLDC GLUCOMTR-MCNC: 134 MG/DL (ref 70–105)
GLUCOSE BLDC GLUCOMTR-MCNC: 134 MG/DL (ref 70–105)
GLUCOSE BLDC GLUCOMTR-MCNC: 137 MG/DL (ref 70–105)
GLUCOSE BLDC GLUCOMTR-MCNC: 144 MG/DL (ref 70–105)
GLUCOSE BLDC GLUCOMTR-MCNC: 155 MG/DL (ref 70–105)
GLUCOSE BLDC GLUCOMTR-MCNC: 99 MG/DL (ref 70–105)
HCT VFR BLD AUTO: 34.1 % (ref 37.5–51)
HGB BLD-MCNC: 11.8 G/DL (ref 13–17.7)
MCH RBC QN AUTO: 32.3 PG (ref 26.6–33)
MCHC RBC AUTO-ENTMCNC: 34.5 G/DL (ref 31.5–35.7)
MCV RBC AUTO: 93.5 FL (ref 79–97)
PLATELET # BLD AUTO: 166 10*3/MM3 (ref 140–450)
PMV BLD AUTO: 8.6 FL (ref 6–12)
POTASSIUM BLD-SCNC: 3.7 MMOL/L (ref 3.5–5.2)
RBC # BLD AUTO: 3.64 10*6/MM3 (ref 4.14–5.8)
SODIUM BLD-SCNC: 139 MMOL/L (ref 136–145)
WBC NRBC COR # BLD: 13 10*3/MM3 (ref 3.4–10.8)

## 2020-05-06 PROCEDURE — 25010000002 FUROSEMIDE PER 20 MG: Performed by: NURSE PRACTITIONER

## 2020-05-06 PROCEDURE — 99024 POSTOP FOLLOW-UP VISIT: CPT | Performed by: NURSE PRACTITIONER

## 2020-05-06 PROCEDURE — 25010000002 ENOXAPARIN PER 10 MG: Performed by: NURSE PRACTITIONER

## 2020-05-06 PROCEDURE — 85027 COMPLETE CBC AUTOMATED: CPT | Performed by: NURSE PRACTITIONER

## 2020-05-06 PROCEDURE — 25010000002 CEFAZOLIN PER 500 MG: Performed by: NURSE PRACTITIONER

## 2020-05-06 PROCEDURE — 63710000001 INSULIN REGULAR HUMAN PER 5 UNITS: Performed by: NURSE PRACTITIONER

## 2020-05-06 PROCEDURE — 80048 BASIC METABOLIC PNL TOTAL CA: CPT | Performed by: NURSE PRACTITIONER

## 2020-05-06 PROCEDURE — 97530 THERAPEUTIC ACTIVITIES: CPT

## 2020-05-06 PROCEDURE — 82962 GLUCOSE BLOOD TEST: CPT

## 2020-05-06 PROCEDURE — 93005 ELECTROCARDIOGRAM TRACING: CPT | Performed by: NURSE PRACTITIONER

## 2020-05-06 PROCEDURE — 93010 ELECTROCARDIOGRAM REPORT: CPT | Performed by: INTERNAL MEDICINE

## 2020-05-06 PROCEDURE — 97110 THERAPEUTIC EXERCISES: CPT

## 2020-05-06 PROCEDURE — 71045 X-RAY EXAM CHEST 1 VIEW: CPT

## 2020-05-06 PROCEDURE — 25010000002 MORPHINE PER 10 MG: Performed by: NURSE PRACTITIONER

## 2020-05-06 PROCEDURE — 99232 SBSQ HOSP IP/OBS MODERATE 35: CPT | Performed by: INTERNAL MEDICINE

## 2020-05-06 RX ORDER — NICOTINE POLACRILEX 4 MG
15 LOZENGE BUCCAL
Status: DISCONTINUED | OUTPATIENT
Start: 2020-05-06 | End: 2020-05-08 | Stop reason: HOSPADM

## 2020-05-06 RX ORDER — POTASSIUM CHLORIDE 750 MG/1
10 TABLET, FILM COATED, EXTENDED RELEASE ORAL ONCE
Status: COMPLETED | OUTPATIENT
Start: 2020-05-06 | End: 2020-05-06

## 2020-05-06 RX ORDER — POTASSIUM CHLORIDE 20 MEQ/1
20 TABLET, EXTENDED RELEASE ORAL ONCE
Status: DISCONTINUED | OUTPATIENT
Start: 2020-05-06 | End: 2020-05-06

## 2020-05-06 RX ORDER — FUROSEMIDE 10 MG/ML
20 INJECTION INTRAMUSCULAR; INTRAVENOUS ONCE
Status: COMPLETED | OUTPATIENT
Start: 2020-05-06 | End: 2020-05-06

## 2020-05-06 RX ORDER — DEXTROSE MONOHYDRATE 25 G/50ML
25 INJECTION, SOLUTION INTRAVENOUS
Status: DISCONTINUED | OUTPATIENT
Start: 2020-05-06 | End: 2020-05-08 | Stop reason: HOSPADM

## 2020-05-06 RX ADMIN — CHLORHEXIDINE GLUCONATE 0.12% ORAL RINSE 15 ML: 1.2 LIQUID ORAL at 20:22

## 2020-05-06 RX ADMIN — FUROSEMIDE 20 MG: 10 INJECTION, SOLUTION INTRAMUSCULAR; INTRAVENOUS at 10:38

## 2020-05-06 RX ADMIN — PANTOPRAZOLE SODIUM 40 MG: 40 TABLET, DELAYED RELEASE ORAL at 06:03

## 2020-05-06 RX ADMIN — POLYETHYLENE GLYCOL 3350 17 G: 17 POWDER, FOR SOLUTION ORAL at 08:23

## 2020-05-06 RX ADMIN — METOPROLOL TARTRATE 12.5 MG: 25 TABLET, FILM COATED ORAL at 20:22

## 2020-05-06 RX ADMIN — METOPROLOL TARTRATE 12.5 MG: 25 TABLET, FILM COATED ORAL at 10:38

## 2020-05-06 RX ADMIN — HYDROCODONE BITARTRATE AND ACETAMINOPHEN 2 TABLET: 5; 325 TABLET ORAL at 20:22

## 2020-05-06 RX ADMIN — CHLORHEXIDINE GLUCONATE 0.12% ORAL RINSE 15 ML: 1.2 LIQUID ORAL at 08:23

## 2020-05-06 RX ADMIN — MORPHINE SULFATE 2 MG: 4 INJECTION INTRAVENOUS at 01:59

## 2020-05-06 RX ADMIN — POTASSIUM CHLORIDE 10 MEQ: 750 TABLET, EXTENDED RELEASE ORAL at 10:38

## 2020-05-06 RX ADMIN — MORPHINE SULFATE 2 MG: 4 INJECTION INTRAVENOUS at 15:41

## 2020-05-06 RX ADMIN — MUPIROCIN 1 APPLICATION: 20 OINTMENT TOPICAL at 20:22

## 2020-05-06 RX ADMIN — MUPIROCIN 1 APPLICATION: 20 OINTMENT TOPICAL at 08:23

## 2020-05-06 RX ADMIN — MORPHINE SULFATE 2 MG: 4 INJECTION INTRAVENOUS at 06:18

## 2020-05-06 RX ADMIN — ATORVASTATIN CALCIUM 40 MG: 40 TABLET, FILM COATED ORAL at 20:22

## 2020-05-06 RX ADMIN — SENNOSIDES 1 TABLET: 8.6 TABLET, FILM COATED ORAL at 20:22

## 2020-05-06 RX ADMIN — ASPIRIN 81 MG: 81 TABLET, COATED ORAL at 08:23

## 2020-05-06 RX ADMIN — MORPHINE SULFATE 2 MG: 4 INJECTION INTRAVENOUS at 10:27

## 2020-05-06 RX ADMIN — ENOXAPARIN SODIUM 40 MG: 40 INJECTION SUBCUTANEOUS at 15:41

## 2020-05-06 RX ADMIN — SODIUM CHLORIDE 1.2 UNITS/HR: 9 INJECTION, SOLUTION INTRAVENOUS at 14:09

## 2020-05-06 RX ADMIN — CEFAZOLIN SODIUM 2 G: 10 INJECTION, POWDER, FOR SOLUTION INTRAVENOUS at 06:03

## 2020-05-06 RX ADMIN — SENNOSIDES 1 TABLET: 8.6 TABLET, FILM COATED ORAL at 08:23

## 2020-05-06 RX ADMIN — POLYETHYLENE GLYCOL 3350 17 G: 17 POWDER, FOR SOLUTION ORAL at 20:22

## 2020-05-06 NOTE — THERAPY TREATMENT NOTE
Acute Care - Occupational Therapy Treatment Note  [facility]     Patient Name: Keshawn Lr  : 1950  MRN: [id number]  Today's Date: 2020             Admit Date: 2020       ICD-10-CM ICD-9-CM   1. Subendocardial MI first episode care (CMS/HCC) I21.4 410.71   2. Coronary artery disease involving native coronary artery of native heart with unstable angina pectoris (CMS/[facility]) I25.110 414.01     411.1     Patient Active Problem List   Diagnosis   • Subendocardial MI first episode care (CMS/[facility])   • Essential hypertension   • Dyslipidemia   • Coronary artery disease of native artery of native heart with stable angina pectoris (CMS/[facility])   • Coronary artery disease involving native coronary artery of native heart with unstable angina pectoris (CMS/[facility])     Past Medical History:   Diagnosis Date   • Hyperlipidemia    • Hypertension      Past Surgical History:   Procedure Laterality Date   • FINGER SURGERY     • UVULECTOMY         Therapy Treatment    Rehabilitation Treatment Summary     Row Name 20 0946             Treatment Time/Intention    Discipline  occupational therapist  -SR      Document Type  therapy note (daily note)  -SR      Subjective Information  no complaints  -SR      Recorded by [SR] Lety Ricketts, OT 20 1626      Row Name 20 0946             Functional Mobility    Functional Mobility- Ind. Level  supervision required  -SR      Recorded by [SR] Lety Ricketts, OT 20 1626      Row Name 20 0946             Transfer Assessment/Treatment    Transfer Assessment/Treatment  sit-stand transfer  -SR      Recorded by [SR] Lety Ricketts, OT 20 1626      Row Name 20 0946             Sit-Stand Transfer    Sit-Stand [place] (Transfers)  supervision;verbal cues  -SR      Recorded by [SR] Lety Ricketts, OT 20 1626      Row Name 20 0946             Motor Skills Assessment/Interventions    Additional Documentation   Therapeutic Exercise Interventions (Group);Therapeutic Exercise (Group)  -SR      Recorded by [SR] Lety Ricketts, OT 05/06/20 1626      Row Name 05/06/20 0946             Therapeutic Exercise    Comment (Therapeutic Exercise)  Pt completed cardiac exericse program with OT following print out with mod cues.    -SR      Recorded by [SR] Lety Ricketts, OT 05/06/20 1626      Row Name 05/06/20 0946             Static Sitting Balance    Level of [place] (Unsupported Sitting, Static Balance)  independent  -SR      Recorded by [SR] Lety Ricketts, OT 05/06/20 1626      Row Name 05/06/20 0946             Dynamic Sitting Balance    Level of [place], Reaches Outside Midline (Sitting, Dynamic Balance)  independent  -SR      Recorded by [SR] Lety Ricketts, OT 05/06/20 1626      Row Name 05/06/20 0946             Static Standing Balance    Level of [place] (Supported Standing, Static Balance)  supervision  -SR      Recorded by [SR] Lety Ricketts, OT 05/06/20 1626      Row Name 05/06/20 0946             Dynamic Standing Balance    Level of [place], Reaches Outside Midline (Standing, Dynamic Balance)  supervision  -SR      Time Able to Maintain Position, Reaches Outside Midline (Standing, Dynamic Balance)  3 to 4 minutes  -SR      Recorded by [SR] Lety Ricketts, OT 05/06/20 1626      Row Name 05/06/20 0946             Positioning and Restraints    Pre-Treatment Position  bathroom  -SR      Post Treatment Position  chair  -SR      In Chair  encouraged to call for assist;call light within reach  -SR      Recorded by [SR] Lety Ricketts, OT 05/06/20 1626      Row Name 05/06/20 0946             Pain Scale: Numbers Pre/Post-Treatment    Pain Scale: Numbers, Pretreatment  7/10  -SR      Pain Scale: Numbers, Post-Treatment  7/10  -SR      Recorded by [SR] Lety Ricketts, OT 05/06/20 1626      Row Name                Wound 05/04/20 anterior;midline  sternal Incision    Wound - Properties Group Date first assessed: 05/04/20 [AT] Present on Hospital Admission: N [AT] Orientation: anterior;midline [AT] Location: sternal [AT] Primary Wound Type: Incision [AT] Recorded by:  [AT] Stuart Sanches RN 05/04/20 1333    Row Name                Wound 05/04/20 Right anterior;lower;proximal leg Incision    Wound - Properties Group Date first assessed: 05/04/20 [AT] Present on Hospital Admission: N [AT] Side: Right [AT] Orientation: anterior;lower;proximal [AT] Location: leg [AT] Primary Wound Type: Incision [AT] Recorded by:  [AT] Stuart Sanches RN 05/04/20 1335    Row Name                Wound 05/04/20 Left anterior groin Incision    Wound - Properties Group Date first assessed: 05/04/20 [AT] Present on Hospital Admission: N [AT] Side: Left [AT] Orientation: anterior [AT] Location: groin [AT] Primary Wound Type: Incision [AT] Recorded by:  [AT] Stuart Sanches RN 05/04/20 1335    Row Name 05/06/20 0946             Plan of Care Review    Outcome Summary  Pt progressing well with therapy.  Completes mobility and transfers with supervision and min cues for sternal precautions.  Pt was receptive of education on HEP.  Will continue to follow daily to assess progress.  PPE: mask, faceshield, gloves.   -SR      Recorded by [SR] Lety Ricketts OT 05/06/20 1626        User Key  (r) = Recorded By, (t) = Taken By, (c) = Cosigned By    Initials Name Effective Dates Discipline    SR Lety Ricketts OT 03/01/19 -  OT    AT Stuart Sanches RN 03/01/19 -  Nurse        Wound 05/04/20 anterior;midline sternal Incision (Active)   Dressing Appearance dry;intact;no drainage 5/6/2020  4:20 PM   Closure [place]YTON 5/6/2020  4:20 PM   Base dressing in place, unable to visualize 5/6/2020  4:20 PM   Drainage Amount none 5/6/2020  4:20 PM   Dressing Care, Wound silicone 5/6/2020  4:20 PM       Wound 05/04/20 Right anterior;lower;proximal leg Incision (Active)   Dressing Appearance dry;intact;no  drainage 5/6/2020  4:20 PM   Closure [place] 5/6/2020  4:20 PM   Base dressing in place, unable to visualize 5/6/2020  4:20 PM   Drainage Amount none 5/6/2020  4:20 PM   Dressing Care, Wound border dressing 5/6/2020  4:20 PM       Wound 05/04/20 Left anterior groin Incision (Active)   Dressing Appearance no drainage;intact;dry 5/6/2020  4:20 PM   Closure [place] 5/6/2020  4:20 PM   Base dressing in place, unable to visualize 5/6/2020  4:20 PM   Drainage Amount none 5/6/2020  4:20 PM   Dressing Care, Wound border dressing 5/6/2020  4:20 PM           OT Recommendation and Plan     Outcome Summary: Pt progressing well with therapy.  Completes mobility and transfers with supervision and min cues for sternal precautions.  Pt was receptive of education on HEP.  Will continue to follow daily to assess progress.  PPE: mask, faceshield, gloves.       Time Calculation:   Time Calculation- OT     Row Name 05/06/20 1626             Time Calculation- OT    OT Start Time  0946  -SR      OT Stop Time  1003  -SR      OT Time Calculation (min)  17 min  -SR      Total Timed Code Minutes- OT  17 minute(s)  -SR      OT Received On  05/06/20  -SR      OT - Next Appointment  05/07/20  -SR        User Key  (r) = Recorded By, (t) = Taken By, (c) = Cosigned By    Initials Name Provider Type    SR Lety Ricketts OT Occupational Therapist        Therapy Charges for Today     Code Description Service Date Service Provider Modifiers Qty    85277510470 HC OT THERAPEUTIC ACT EA 15 MIN 5/6/2020 Lety Ricketts OT GO 1    23174087114 HC OT THER PROC EA 15 MIN 5/6/2020 Lety Ricketts, OT GO 1               Lety Ricketts, OT  5/6/2020

## 2020-05-06 NOTE — CONSULTS
Cardiac Rehab evaluation s/p CABG. Patient sitting up chair. Follow up from prior discussion before surgery. Importance of coughing, deep breathing and keeping pain under control.

## 2020-05-06 NOTE — PROGRESS NOTES
"Cardiology Progress  Note      Patient Care Team:  Provider, No Known as PCP - General      PATIENT IDENTIFICATION    Name:@ Age: 70 y.o. Sex:@ : @ MRN:@    REASON FOR FOLLOW-UP:  Non-ST elevation MI  Significant obstructive coronary disease involving the RCA, left circumflex artery ramus branch and also diagonal system status post CABG 2020  Preserved LV function        SUBJECTIVE    Patient reports sternal wound discomfort.  He denies any other complaints. Chest tubes out.  Currently being paced.  Becomes hypotensive with intrinsic rhythm.    Discussed ongoing medical therapy at length with patient.    REVIEW OF SYSTEMS:  Pertinent items are noted in HPI, all other systems reviewed and negative    OBJECTIVE   Sitting in chair watching television  Rhythm AV paced  Blood pressure stable   Drips: Insulin      ASSESSMENT/PLAN    Subendocardial MI first episode care (CMS/[facility])    Essential hypertension    Dyslipidemia    Coronary artery disease of native artery of native heart with stable angina pectoris (CMS/[facility])    Coronary artery disease involving native coronary artery of native heart with unstable angina pectoris (CMS/[facility])    Recommendations  Continuation of his current cardiovascular regimen  Increase activity as tolerated  Lines per surgery  Monitor rhythm        Vital Signs  Visit Vitals  /63   Pulse 82   Temp 98.5 °F (36.9 °C) (Oral)   Resp 17   Ht 188 cm (74.02\")   Wt 99.1 kg (218 lb 7.6 oz)   SpO2 93%   BMI 28.04 kg/m²     Oxygen Therapy  SpO2: 93 %  Pulse Oximetry Type: Continuous  Device (Oxygen Therapy): room air  Device (Oxygen Therapy): room air  Flow (L/min): 4  Flowsheet Rows      First Filed Value   Admission Height  188 cm (74\") Documented at 2020 1403   Admission Weight  95.7 kg (210 lb 15.7 oz) Documented at 2020 1403        Intake & Output (last 3 days)        07 -  0700 / 07 -  0700  07 -  0700 / 07 -  0700    P.O.  120 1080 " "240    I.V. (mL/kg) 41 (0.4) 2875 (30.8) 567 (5.7)     Blood  500      IV Piggyback  250      Total Intake(mL/kg) 41 (0.4) 3745 (40.1) 1647 (16.6) 240 (2.4)    Urine (mL/kg/hr) 1075 (0.5) 2447 (1.1) 775 (0.3)     Other  1600      Blood  600      Chest Tube  340 100     Total Output 1075 4987 875     Net -1034 -1242 +772 +240                Lines, Drains & Airways    Active LDAs     Name:   Placement date:   Placement time:   Site:   Days:    Peripheral IV 05/02/20 1428 Right Antecubital   05/02/20    1428    Antecubital   1                       /63   Pulse 82   Temp 98.5 °F (36.9 °C) (Oral)   Resp 17   Ht 188 cm (74.02\")   Wt 99.1 kg (218 lb 7.6 oz)   SpO2 93%   BMI 28.04 kg/m²   Intake/Output last 3 shifts:  I/O last 3 completed shifts:  In: 3242 [P.O.:1200; I.V.:2042]  Out: 1730 [Urine:1397; Chest Tube:333]  Intake/Output this shift:  I/O this shift:  In: 240 [P.O.:240]  Out: -     PHYSICAL EXAM:    General: Alert, cooperative, no distress, appears stated age  Head:  Normocephalic, atraumatic, mucous membranes moist  Eyes:  Conjunctiva/corneas clear, EOM's intact     Neck:  Lines  Lungs: Crackles,  Chest wall: Sternal wound clean and dry  Heart::  Regular rate and rhythm, S1 and S2 normal, no murmur, rub or gallop  Abdomen: Soft, non-tender, nondistended bowel sounds active  Extremities: 2+ dorsalis pedis and radial pulses  Pulses: 2+ and symmetric all extremities  Skin:  No rashes or lesions  Neuro/psych: A&O x3. CN II through XII are grossly intact with appropriate affect      Scheduled Meds:        aspirin 81 mg Oral Daily   atorvastatin 40 mg Oral Nightly   chlorhexidine 15 mL Mouth/Throat Q12H   enoxaparin 40 mg Subcutaneous Daily   metoprolol tartrate 12.5 mg Oral Q12H   mupirocin 1 application Each Nare BID   pantoprazole 40 mg Oral Q AM   polyethylene glycol 17 g Oral BID   senna 1 tablet Oral BID       Continuous Infusions:      dexmedetomidine 0.2-1.5 mcg/kg/hr Last Rate: Stopped (05/04/20 " 2842)   insulin 0-50 Units/hr Last Rate: 1.2 Units/hr (05/06/20 1043)   nitroglycerin 5-50 mcg/min Last Rate: Stopped (05/04/20 1750)       PRN Meds:    •  albumin human  •  HYDROcodone-acetaminophen **OR** HYDROcodone-acetaminophen  •  insulin  •  Morphine **AND** naloxone  •  Mouth Kote  •  nitroglycerin  •  ondansetron  •  potassium chloride **OR** potassium chloride  •  potassium chloride **OR** potassium chloride        Results Review:     I reviewed the patient's new clinical results.    CBC    Results from last 7 days   Lab Units 05/06/20  0419 05/05/20  0303 05/05/20  0207 05/05/20  0117 05/04/20  2255 05/04/20 2027 05/04/20  1834  05/04/20  1506  05/04/20  0330  05/03/20  0548 05/02/20  1442   WBC 10*3/mm3 13.00* 11.00*  --   --   --   --   --   --  11.60*  --  8.00  --  6.90 6.80   HEMOGLOBIN g/dL 11.8* 12.2*  --   --   --   --   --   --  12.0*  --  14.2  --  14.3 15.8   HEMOGLOBIN, POC g/dL  --   --  12.8 12.2 12.5 12.5 12.4   < >  --    < >  --    < >  --   --    PLATELETS 10*3/mm3 166 166  --   --   --   --   --   --  153  --  228  --  237 269    < > = values in this interval not displayed.     Cr Clearance Estimated Creatinine Clearance: 102.3 mL/min (by C-G formula based on SCr of 0.87 mg/dL).  Coag   Results from last 7 days   Lab Units 05/05/20  0303 05/04/20  1506 05/03/20  1217 05/03/20  0548 05/02/20  2206 05/02/20  1442   INR  1.13* 1.18*  --  1.06  --  1.00   APTT seconds  --  26.9 24.7* 42.3* 37.6* 25.2*     HbA1C   Lab Results   Component Value Date    HGBA1C 5.5 05/03/2020     Blood Glucose   Glucose   Date/Time Value Ref Range Status   05/06/2020 1204 127 (H) 70 - 105 mg/dL Final     Comment:     Serial Number: [id number]:  [id number]   05/06/2020 1042 115 (H) 70 - 105 mg/dL Final     Comment:     Serial Number: [id number]:  [id number]   05/06/2020 0809 132 (H) 70 - 105 mg/dL Final     Comment:     Serial Number: [id number]:  [id number]   05/06/2020 0417 144 (H) 70 - 105  mg/dL Final     Comment:     Serial Number: [id number]:  [id number]   05/06/2020 0156 105 70 - 105 mg/dL Final     Comment:     Serial Number: [id number]:  [id number]   05/05/2020 2335 136 (H) 70 - 105 mg/dL Final     Comment:     Serial Number: [id number]:  [id number]   05/05/2020 2249 127 (H) 70 - 105 mg/dL Final     Comment:     Serial Number: [id number]:  [id number]   05/05/2020 2114 107 (H) 70 - 105 mg/dL Final     Comment:     Serial Number: [id number]:  [id number]     Infection     CMP   Results from last 7 days   Lab Units 05/06/20  0419 05/05/20  0303 05/04/20  1506 05/04/20  0330 05/03/20  1334 05/03/20  0548 05/02/20  1442   SODIUM mmol/L 139 145 140  --  140 142 142   POTASSIUM mmol/L 3.7 4.1 4.5 4.0 4.2 4.7 4.0   CHLORIDE mmol/L 104 111* 108*  --  105 106 104   CO2 mmol/L 23.0 20.0* 21.0*  --  24.0 27.0 26.0   BUN mg/dL 23 22 18  --  11 14 17   CREATININE mg/dL 0.87 1.03 1.26  --  0.93 1.01 1.01   GLUCOSE mg/dL 146* 134* 78  --  121* 118* 108*   ALBUMIN g/dL  --  4.20 4.10  --  4.10  --   --    BILIRUBIN mg/dL  --   --   --   --  0.7  --   --    ALK PHOS U/L  --   --   --   --  82  --   --    AST (SGOT) U/L  --   --   --   --  35  --   --    ALT (SGPT) U/L  --   --   --   --  31  --   --      ABG    Results from last 7 days   Lab Units 05/05/20  0207 05/05/20  0117 05/04/20  2255 05/04/20 2027 05/04/20  1834 05/04/20  1719 05/04/20  1629   PH, ARTERIAL pH units 7.356 7.387 7.302* 7.301* 7.368 7.360 7.353   PCO2, ARTERIAL mm Hg 41.2 38.2 45.0 49.6* 41.7 40.0 41.6   PO2 ART mm Hg 112.5* 119.7* 128.9* 125.1* 98.5 98.8 139.5*   O2 SATURATION ART % 98.2* 98.6* 98.6* 98.4* 97.4 97.4 99.0*   BASE EXCESS ART mmol/L -2.4* -1.8* -4.2* -2.4* -1.4* -2.7* -2.4*     UA    Results from last 7 days   Lab Units 05/03/20  1503   NITRITE UA  Negative   WBC UA /HPF None Seen   BACTERIA UA /HPF None Seen   SQUAM EPITHEL UA /HPF None Seen     LONNIE  No results found for: POCMETH, POCAMPHET,  POCBARBITUR, POCBENZO, POCCOCAINE, POCOPIATES, POCOXYCODO, POCPHENCYC, POCPROPOXY, POCTHC, POCTRICYC  Lysis Labs   Results from last 7 days   Lab Units 05/06/20  0419 05/05/20  0303 05/05/20  0207 05/05/20  0117 05/04/20  2255 05/04/20 2027 05/04/20  1834  05/04/20  1506  05/04/20  0330  05/03/20  1334 05/03/20  1217 05/03/20  0548 05/02/20  2206 05/02/20  1442   INR   --  1.13*  --   --   --   --   --   --  1.18*  --   --   --   --   --  1.06  --  1.00   APTT seconds  --   --   --   --   --   --   --   --  26.9  --   --   --   --  24.7* 42.3* 37.6* 25.2*   HEMOGLOBIN g/dL 11.8* 12.2*  --   --   --   --   --   --  12.0*  --  14.2  --   --   --  14.3  --  15.8   HEMOGLOBIN, POC g/dL  --   --  12.8 12.2 12.5 12.5 12.4   < >  --    < >  --    < >  --   --   --   --   --    PLATELETS 10*3/mm3 166 166  --   --   --   --   --   --  153  --  228  --   --   --  237  --  269   CREATININE mg/dL 0.87 1.03  --   --   --   --   --   --  1.26  --   --   --  0.93  --  1.01  --  1.01    < > = values in this interval not displayed.     Radiology(recent) Xr Chest 1 View    Result Date: 5/6/2020  1.increased bibasilar opacities likely atelectasis with probable small pleural effusion left larger than right. 2.Cardiomegaly postoperative changes the heart. 3.No significant pneumothorax.  Electronically Signed By-Ama Pinzon MD On:5/6/2020 7:19 AM This report was finalized on [id number] by  Ama Pinzon MD.    Xr Chest 1 View    Result Date: 5/5/2020   1. Status post CABG. 2. Interim removal of the right-sided chest tube with no pneumothorax. 3. Mild bibasilar atelectasis.  Electronically Signed ByBraydon Plasencia On:5/5/2020 3:29 PM This report was finalized on [id number] by  Vikas Plasencia, .    Xr Chest 1 View    Result Date: 5/5/2020   1. ET tube and NG tube have been removed. 2. Additional tubes and lines as above. 3. Minor bibasilar atelectasis with small left effusion.  Electronically Signed By-Hero Kumar On:5/5/2020 8:38 AM This  report was finalized on [id number] by  Hero Kumar, .    Xr Chest 1 View    Result Date: 5/4/2020   1. Interval CABG procedure. 2. The lines and support tubes are in good position as described. 3. No pneumothorax. 4. Mild right basilar subsegmental atelectasis with suggestion of a tiny pleural effusion.  Electronically Signed By-Arya Casper On:5/4/2020 4:33 PM This report was finalized on [id number] by  Arya Casper, .        Results from last 7 days   Lab Units 05/03/20  0548   TROPONIN T ng/mL 0.400*       Xrays, labs reviewed personally by physician.    ECG/EMG Results (most recent)     Procedure Component Value Units Date/Time    ECG 12 Lead [[id number]] Collected:  05/02/20 1412     Updated:  05/03/20 1258    Narrative:       HEART RATE= 69  bpm  RR Interval= 868  ms  IA Interval= 210  ms  P Horizontal Axis= -8  deg  P Front Axis= 43  deg  QRSD Interval= 76  ms  QT Interval= 382  ms  QRS Axis= 9  deg  T Wave Axis= 25  deg  - NORMAL ECG -  Sinus rhythm  No previous ECG available for comparison  Electronically Signed By: Justin Toth ([facility]) 03-May-2020 12:58:00  Date and Time of Study: 2020-05-02 14:12:41    ECG 12 Lead [[id number]] Collected:  05/05/20 0525     Updated:  05/05/20 0526    Narrative:       HEART RATE= 52  bpm  RR Interval= 1168  ms  IA Interval= 247  ms  P Horizontal Axis= 44  deg  P Front Axis= 24  deg  QRSD Interval= 81  ms  QT Interval= 406  ms  QRS Axis= 49  deg  T Wave Axis= 51  deg  - ABNORMAL ECG -  Sinus bradycardia  Atrial premature complex  Prolonged IA interval  ST elevation suggests acute pericarditis  Electronically Signed By:   Date and Time of Study: 2020-05-05 05:25:00    ECG 12 Lead [[id number]] Collected:  05/04/20 1541     Updated:  05/05/20 1748    Narrative:       HEART RATE= 46  bpm  RR Interval= 1296  ms  IA Interval= 232  ms  P Horizontal Axis= 46  deg  P Front Axis= 73  deg  QRSD Interval= 90  ms  QT Interval= 466  ms  QRS Axis= 52  deg  T Wave Axis= 0  deg  - ABNORMAL  ECG -  Sinus bradycardia  First-degree AV block  When compared with ECG of 02-May-2020 14:12:41,  Significant rate decrease  Electronically Signed By: Steve Yan ([facility]) 05-May-2020 17:48:18  Date and Time of Study: 2020-05-04 15:41:37    ECG 12 Lead [[id number]] Collected:  05/06/20 0411     Updated:  05/06/20 0413    Narrative:       HEART RATE= 80  bpm  RR Interval= 756  ms  WV Interval= 207  ms  P Horizontal Axis= -16  deg  P Front Axis= 52  deg  QRSD Interval= 95  ms  QT Interval= 348  ms  QRS Axis= 19  deg  T Wave Axis= -28  deg  - NORMAL ECG -  Sinus rhythm  When compared with ECG of 05-May-2020 5:25:00,  Significant rate increase  Significant repolarization change  Electronically Signed By:   Date and Time of Study: 2020-05-06 04:11:30            Medication Review:   I have reviewed the patient's current medication list  Scheduled Meds:    aspirin 81 mg Oral Daily   atorvastatin 40 mg Oral Nightly   chlorhexidine 15 mL Mouth/Throat Q12H   enoxaparin 40 mg Subcutaneous Daily   metoprolol tartrate 12.5 mg Oral Q12H   mupirocin 1 application Each Nare BID   pantoprazole 40 mg Oral Q AM   polyethylene glycol 17 g Oral BID   senna 1 tablet Oral BID     Continuous Infusions:    dexmedetomidine 0.2-1.5 mcg/kg/hr Last Rate: Stopped (05/04/20 1545)   insulin 0-50 Units/hr Last Rate: 1.2 Units/hr (05/06/20 1043)   nitroglycerin 5-50 mcg/min Last Rate: Stopped (05/04/20 1750)     PRN Meds:.•  albumin human  •  HYDROcodone-acetaminophen **OR** HYDROcodone-acetaminophen  •  insulin  •  Morphine **AND** naloxone  •  Mouth Kote  •  nitroglycerin  •  ondansetron  •  potassium chloride **OR** potassium chloride  •  potassium chloride **OR** potassium chloride    Imaging:  Imaging Results (Last 72 Hours)     Procedure Component Value Units Date/Time    XR Chest 1 View [[id number]] Collected:  05/06/20 0715     Updated:  05/06/20 0721    Narrative:       DATE OF EXAM:  5/6/2020 5:25 AM     PROCEDURE:  XR CHEST 1 VW-      INDICATIONS:  Post-Op Heart Surgery; I21.4-Non-ST elevation (NSTEMI) myocardial  infarction; I25.110-Atherosclerotic heart disease of native coronary  artery with unstable angina pectoris     COMPARISON:  05/05/2020     TECHNIQUE:   Single radiographic view of the chest was obtained.     FINDINGS:  The right internal jugular catheter has unchanged position. Heart is  enlarged changes of CABG. There is increased bibasilar opacities likely  atelectasis. Probable small pleural effusions. No significant  pneumothorax is seen.       Impression:       1.increased bibasilar opacities likely atelectasis with probable small  pleural effusion left larger than right.  2.Cardiomegaly postoperative changes the heart.  3.No significant pneumothorax.     Electronically Signed By-Ama Pinzon MD On:5/6/2020 7:19 AM  This report was finalized on [id number] by  Ama Pinzon MD.    XR Chest 1 View [[id number]] Collected:  05/05/20 1527     Updated:  05/05/20 1531    Narrative:       DATE OF EXAM:  5/5/2020 3:12 PM     PROCEDURE:  XR CHEST 1 VW-     INDICATIONS:  chest tube removal; I21.4-Non-ST elevation (NSTEMI) myocardial  infarction; I25.110-Atherosclerotic heart disease of native coronary  artery with unstable angina pectoris     COMPARISON:  05/05/2020 5:59 AM     TECHNIQUE:   Single radiographic AP view of the chest was obtained.     FINDINGS:  There has been interim removal of the patient's right-sided chest tube.  No pneumothorax is identified. Cardiac size is enlarged and  postoperative changes of prior sternotomy. The Ernul-Anjana catheter has  been removed and the sheath remains in the right internal jugular vein.  There is mild bibasilar atelectasis.        Impression:          1. Status post CABG.  2. Interim removal of the right-sided chest tube with no pneumothorax.  3. Mild bibasilar atelectasis.     Electronically Signed By-Vikas Plasencia On:5/5/2020 3:29 PM  This report was finalized on [id number] by  Vikas Plasencia, .     XR Chest 1 View [[id number]] Collected:  05/05/20 0834     Updated:  05/05/20 0840    Narrative:       DATE OF EXAM:  5/5/2020 5:56 AM     PROCEDURE:  XR CHEST 1 VW-     INDICATIONS:  Post-Op Heart Surgery; I21.4-Non-ST elevation (NSTEMI) myocardial  infarction; I25.110-Atherosclerotic heart disease of native coronary  artery with unstable angina pectoris     COMPARISON: 05/04/2020 TECHNIQUE:   Single radiographic view of the chest was obtained.     FINDINGS:  Sternotomy wires again overlie the midline. ET tube and NG tube have  been removed. Right IJ introducer sheath and Stendal-Anjana catheter again  noted with the tip projecting over the pulmonary outflow. Left basilar  and midline chest tubes and right basilar chest tube again noted. No  pneumothorax. Pulmonary vascularity is within normal limits. There is a  small left pleural effusion. Bibasilar linear opacities suggesting minor  atelectasis. Heart size and mediastinal contour appear stable. Probable  calcified right paratracheal lymph node. There are degenerative changes  in the shoulders and spine.       Impression:          1. ET tube and NG tube have been removed.  2. Additional tubes and lines as above.  3. Minor bibasilar atelectasis with small left effusion.     Electronically Signed By-Hero Kumar On:5/5/2020 8:38 AM  This report was finalized on [id number] by  Hero Kumar, .    XR Chest 1 View [[id number]] Collected:  05/04/20 1631     Updated:  05/04/20 1635    Narrative:       DATE OF EXAM:  5/4/2020 4:13 PM     PROCEDURE:  XR CHEST 1 VW-     INDICATIONS:  Myocardial infarction, status post a CABG procedure, postoperative  follow-up.      COMPARISON:  05/02/2020.     TECHNIQUE:   Single radiographic view of the chest was obtained.     FINDINGS:  The patient's had an interval CABG procedure. There are 2 mediastinal  chest tubes in place as well as a right-sided chest tube. There is no  pneumothorax. The endotracheal tube tip is in good position  "terminating  at the level of the aortic knob. The [place]-Anjana catheter terminates in  the main pulmonary artery segment. There is a nasogastric tube in place  with its tip terminating in the fundus of the stomach.  The pulmonary  vascular markings are normal. There are patchy linear densities in the  right lung base consistent with subsegmental atelectasis. There may be a  tiny right pleural effusion.       Impression:          1. Interval CABG procedure.  2. The lines and support tubes are in good position as described.  3. No pneumothorax.  4. Mild right basilar subsegmental atelectasis with suggestion of a tiny  pleural effusion.     Electronically Signed By-Arya Casper On:5/4/2020 4:33 PM  This report was finalized on [id number] by  Arya Casper, .            EMMA Castillo  05/06/20  12:47       [facility] Dragon/Transcription:   \"Dictated utilizing Dragon dictation\".           Electronically signed by EMMA Castillo, 05/06/20, 12:48 PM.    "

## 2020-05-06 NOTE — PROGRESS NOTES
Continued Stay Note  [facility]     Patient Name: Keshawn Lr  MRN: [id number]  Today's Date: 5/6/2020    Admit Date: 5/2/2020    Discharge Plan     Row Name 05/06/20 0808       Plan    Plan  Anticipate home with spouse. Anticipate need for RW and possible shower chair per wife request.     Plan Comments  Barriers to discharge: POD#2 CABG with RIMA Anna [place] [address]   [phone]  office  [phone]  fax  [email or web address]

## 2020-05-06 NOTE — PLAN OF CARE
Problem: Patient Care Overview  Goal: Plan of Care Review  Outcome: Ongoing (interventions implemented as appropriate)  Flowsheets (Taken 5/6/2020 0367)  Outcome Summary: Pt progressing well with therapy.  Completes mobility and transfers with supervision and min cues for sternal precautions.  Pt was receptive of education on HEP.  Will continue to follow daily to assess progress.  PPE: mask, faceshield, gloves.

## 2020-05-06 NOTE — PROGRESS NOTES
S/P POD# 2 CABG with DAWNA--[place]  EF 55-60% (cath)    Subjective:  C/o mid sternal soreness    No events overnight  Drips:  Insulin  CVP 12      Intake/Output Summary (Last 24 hours) at 5/6/2020 0952  Last data filed at 5/6/2020 0500  Gross per 24 hour   Intake 1647 ml   Output 875 ml   Net 772 ml     Temp:  [97.8 °F (36.6 °C)-100.1 °F (37.8 °C)] 97.8 °F (36.6 °C)  Heart Rate:  [55-92] 80  Resp:  [17-20] 17  BP: ()/(50-84) 131/76    Results from last 7 days   Lab Units 05/06/20 0419 05/05/20  0303  05/04/20  1506  05/03/20  0548   WBC 10*3/mm3 13.00* 11.00*  --  11.60*   < > 6.90   HEMOGLOBIN g/dL 11.8* 12.2*  --  12.0*   < > 14.3   HEMOGLOBIN, POC   --   --    < >  --    < >  --    HEMATOCRIT % 34.1* 35.1*  --  35.3*   < > 41.3   HEMATOCRIT POC   --   --    < >  --    < >  --    PLATELETS 10*3/mm3 166 166  --  153   < > 237   INR   --  1.13*  --  1.18*  --  1.06    < > = values in this interval not displayed.     Results from last 7 days   Lab Units 05/06/20 0419 05/05/20  0303   CREATININE mg/dL 0.87 1.03   POTASSIUM mmol/L 3.7 4.1   SODIUM mmol/L 139 145   MAGNESIUM mg/dL  --  2.6*   PHOSPHORUS mg/dL  --  4.2       Physical Exam:  Neuro intact, nad, up in chair  Tele: SR 70's  Diminished bases, 94% Room Air  dsg c/d/i, no drainage  Benign abd, no BM  No edema    Assessment/Plan:  Principal Problem:    Subendocardial MI first episode care (CMS/[facility])  Active Problems:    Essential hypertension    Dyslipidemia    Coronary artery disease of native artery of native heart with stable angina pectoris (CMS/[facility])    Coronary artery disease involving native coronary artery of native heart with unstable angina pectoris (CMS/[facility])    MV CAD,  EF 55-60%--s/p POD#2 CABG--doing well, asa/statin, add low dose BB  NSTEMI  HTN--stable  HLD--statin    Routine care  Mobilize  Gentle diuresis  DC central line  Add low dose BB  Anticipate home at discharge    KRISTINA BULLOCK, APRN  5/6/2020  09:52

## 2020-05-06 NOTE — PLAN OF CARE
Problem: Patient Care Overview  Goal: Plan of Care Review  Outcome: Ongoing (interventions implemented as appropriate)  Flowsheets (Taken 5/6/2020 1640)  Progress: improving  Plan of Care Reviewed With: patient  Outcome Summary: POD#2 urgent CABG x4 w/ LIMA. External pacemaker turned off. Wires still connected to pacemaker & secured to pt's chest. NSR with HR in 70s. Right IJ d/c'd. Pt states he is experiencing gas/cramps in stomach, wanting to have BM. Receiving scheduled miralax & senna BID. Ambulating in room, sitting in chair majority of day. Spends little time in bed. Per patient, pain is more under control today. Will continue monitoring closely.    Problem: Patient Care Overview  Goal: Individualization and Mutuality  Outcome: Ongoing (interventions implemented as appropriate)     Problem: Patient Care Overview  Goal: Discharge Needs Assessment  Outcome: Ongoing (interventions implemented as appropriate)     Problem: Patient Care Overview  Goal: Interprofessional Rounds/Family Conf  Outcome: Ongoing (interventions implemented as appropriate)     Problem: Cardiac: ACS (Acute Coronary Syndrome) (Adult)  Goal: Signs and Symptoms of Listed Potential Problems Will be Absent, Minimized or Managed (Cardiac: ACS)  Outcome: Ongoing (interventions implemented as appropriate)     Problem: Hypertensive Disease/Crisis (Arterial) (Adult)  Goal: Signs and Symptoms of Listed Potential Problems Will be Absent, Minimized or Managed (Hypertensive Disease/Crisis)  Outcome: Ongoing (interventions implemented as appropriate)     Problem: Skin Injury Risk (Adult)  Goal: Skin Health and Integrity  Outcome: Ongoing (interventions implemented as appropriate)     Problem: Skin Injury Risk (Adult)  Goal: Identify Related Risk Factors and Signs and Symptoms  Outcome: Ongoing (interventions implemented as appropriate)     Problem: Fall Risk (Adult)  Goal: Identify Related Risk Factors and Signs and Symptoms  Outcome: Ongoing  (interventions implemented as appropriate)     Problem: Fall Risk (Adult)  Goal: Absence of Fall  Outcome: Ongoing (interventions implemented as appropriate)

## 2020-05-07 LAB
ANION GAP SERPL CALCULATED.3IONS-SCNC: 12 MMOL/L (ref 5–15)
BUN BLD-MCNC: 31 MG/DL (ref 8–23)
BUN/CREAT SERPL: 34.1 (ref 7–25)
CALCIUM SPEC-SCNC: 8.3 MG/DL (ref 8.6–10.5)
CHLORIDE SERPL-SCNC: 103 MMOL/L (ref 98–107)
CO2 SERPL-SCNC: 23 MMOL/L (ref 22–29)
CREAT BLD-MCNC: 0.91 MG/DL (ref 0.76–1.27)
DEPRECATED RDW RBC AUTO: 45.5 FL (ref 37–54)
ERYTHROCYTE [DISTWIDTH] IN BLOOD BY AUTOMATED COUNT: 14 % (ref 12.3–15.4)
GFR SERPL CREATININE-BSD FRML MDRD: 83 ML/MIN/1.73
GLUCOSE BLD-MCNC: 126 MG/DL (ref 65–99)
GLUCOSE BLDC GLUCOMTR-MCNC: 102 MG/DL (ref 70–105)
GLUCOSE BLDC GLUCOMTR-MCNC: 104 MG/DL (ref 70–105)
GLUCOSE BLDC GLUCOMTR-MCNC: 109 MG/DL (ref 70–105)
GLUCOSE BLDC GLUCOMTR-MCNC: 114 MG/DL (ref 70–105)
GLUCOSE BLDC GLUCOMTR-MCNC: 118 MG/DL (ref 70–105)
GLUCOSE BLDC GLUCOMTR-MCNC: 135 MG/DL (ref 70–105)
HCT VFR BLD AUTO: 33.2 % (ref 37.5–51)
HGB BLD-MCNC: 11.5 G/DL (ref 13–17.7)
MCH RBC QN AUTO: 32.4 PG (ref 26.6–33)
MCHC RBC AUTO-ENTMCNC: 34.7 G/DL (ref 31.5–35.7)
MCV RBC AUTO: 93.3 FL (ref 79–97)
PLATELET # BLD AUTO: 171 10*3/MM3 (ref 140–450)
PMV BLD AUTO: 9.6 FL (ref 6–12)
POTASSIUM BLD-SCNC: 4.1 MMOL/L (ref 3.5–5.2)
RBC # BLD AUTO: 3.56 10*6/MM3 (ref 4.14–5.8)
SODIUM BLD-SCNC: 138 MMOL/L (ref 136–145)
WBC NRBC COR # BLD: 12.5 10*3/MM3 (ref 3.4–10.8)

## 2020-05-07 PROCEDURE — 97530 THERAPEUTIC ACTIVITIES: CPT

## 2020-05-07 PROCEDURE — 82962 GLUCOSE BLOOD TEST: CPT

## 2020-05-07 PROCEDURE — 94762 N-INVAS EAR/PLS OXIMTRY CONT: CPT

## 2020-05-07 PROCEDURE — 85027 COMPLETE CBC AUTOMATED: CPT | Performed by: NURSE PRACTITIONER

## 2020-05-07 PROCEDURE — 99232 SBSQ HOSP IP/OBS MODERATE 35: CPT | Performed by: INTERNAL MEDICINE

## 2020-05-07 PROCEDURE — 97112 NEUROMUSCULAR REEDUCATION: CPT

## 2020-05-07 PROCEDURE — 97116 GAIT TRAINING THERAPY: CPT

## 2020-05-07 PROCEDURE — 99024 POSTOP FOLLOW-UP VISIT: CPT | Performed by: NURSE PRACTITIONER

## 2020-05-07 PROCEDURE — 25010000002 ENOXAPARIN PER 10 MG: Performed by: NURSE PRACTITIONER

## 2020-05-07 PROCEDURE — 80048 BASIC METABOLIC PNL TOTAL CA: CPT | Performed by: NURSE PRACTITIONER

## 2020-05-07 RX ORDER — FUROSEMIDE 40 MG/1
40 TABLET ORAL DAILY
Status: DISCONTINUED | OUTPATIENT
Start: 2020-05-07 | End: 2020-05-08 | Stop reason: HOSPADM

## 2020-05-07 RX ORDER — POTASSIUM CHLORIDE 750 MG/1
10 TABLET, FILM COATED, EXTENDED RELEASE ORAL DAILY
Status: DISCONTINUED | OUTPATIENT
Start: 2020-05-07 | End: 2020-05-08 | Stop reason: HOSPADM

## 2020-05-07 RX ORDER — LACTULOSE 10 G/15ML
30 SOLUTION ORAL DAILY
Status: DISCONTINUED | OUTPATIENT
Start: 2020-05-07 | End: 2020-05-08 | Stop reason: HOSPADM

## 2020-05-07 RX ADMIN — ENOXAPARIN SODIUM 40 MG: 40 INJECTION SUBCUTANEOUS at 17:00

## 2020-05-07 RX ADMIN — FUROSEMIDE 40 MG: 40 TABLET ORAL at 10:26

## 2020-05-07 RX ADMIN — HYDROCODONE BITARTRATE AND ACETAMINOPHEN 2 TABLET: 5; 325 TABLET ORAL at 03:14

## 2020-05-07 RX ADMIN — MUPIROCIN 1 APPLICATION: 20 OINTMENT TOPICAL at 08:01

## 2020-05-07 RX ADMIN — ATORVASTATIN CALCIUM 40 MG: 40 TABLET, FILM COATED ORAL at 20:00

## 2020-05-07 RX ADMIN — CHLORHEXIDINE GLUCONATE 0.12% ORAL RINSE 15 ML: 1.2 LIQUID ORAL at 08:01

## 2020-05-07 RX ADMIN — METOPROLOL TARTRATE 12.5 MG: 25 TABLET, FILM COATED ORAL at 08:00

## 2020-05-07 RX ADMIN — HYDROCODONE BITARTRATE AND ACETAMINOPHEN 1 TABLET: 5; 325 TABLET ORAL at 18:55

## 2020-05-07 RX ADMIN — LACTULOSE 30 G: 10 SOLUTION ORAL at 08:01

## 2020-05-07 RX ADMIN — PANTOPRAZOLE SODIUM 40 MG: 40 TABLET, DELAYED RELEASE ORAL at 05:19

## 2020-05-07 RX ADMIN — ASPIRIN 81 MG: 81 TABLET, COATED ORAL at 08:00

## 2020-05-07 RX ADMIN — METOPROLOL TARTRATE 12.5 MG: 25 TABLET, FILM COATED ORAL at 20:00

## 2020-05-07 RX ADMIN — POLYETHYLENE GLYCOL 3350 17 G: 17 POWDER, FOR SOLUTION ORAL at 08:00

## 2020-05-07 RX ADMIN — HYDROCODONE BITARTRATE AND ACETAMINOPHEN 1 TABLET: 5; 325 TABLET ORAL at 08:48

## 2020-05-07 RX ADMIN — HYDROCODONE BITARTRATE AND ACETAMINOPHEN 1 TABLET: 5; 325 TABLET ORAL at 13:32

## 2020-05-07 RX ADMIN — SENNOSIDES 1 TABLET: 8.6 TABLET, FILM COATED ORAL at 08:01

## 2020-05-07 RX ADMIN — POTASSIUM CHLORIDE 10 MEQ: 750 TABLET, EXTENDED RELEASE ORAL at 10:26

## 2020-05-07 RX ADMIN — MUPIROCIN 1 APPLICATION: 20 OINTMENT TOPICAL at 20:00

## 2020-05-07 NOTE — PLAN OF CARE
Pt resting comfortably throughout the night. Pt states pain has been well controlled this shift. Pacer wires remain in place. Pt up to bathroom with standby assistance.

## 2020-05-07 NOTE — THERAPY DISCHARGE NOTE
Patient Name: Keshawn Lr  : 1950    MRN: [id number]                              Today's Date: 2020       Admit Date: 2020    Visit Dx:     ICD-10-CM ICD-9-CM   1. Subendocardial MI first episode care (CMS/HCC) I21.4 410.71   2. Coronary artery disease involving native coronary artery of native heart with unstable angina pectoris (CMS/[facility]) I25.110 414.01     411.1     Patient Active Problem List   Diagnosis   • Subendocardial MI first episode care (CMS/[facility])   • Essential hypertension   • Dyslipidemia   • Coronary artery disease of native artery of native heart with stable angina pectoris (CMS/[facility])   • Coronary artery disease involving native coronary artery of native heart with unstable angina pectoris (CMS/[facility])     Past Medical History:   Diagnosis Date   • Hyperlipidemia    • Hypertension      Past Surgical History:   Procedure Laterality Date   • FINGER SURGERY     • UVULECTOMY       General Information     Row Name 20 1527          PT Evaluation Time/Intention    Document Type  therapy note (daily note)  -HC     Mode of Treatment  physical therapy  -     Row Name 20 1527          General Information    Patient Profile Reviewed?  yes  -HC     Existing Precautions/Restrictions  sternal  -     Row Name 20 1527          Cognitive Assessment/Intervention- PT/OT    Orientation Status (Cognition)  oriented x 4  -HC       User Key  (r) = Recorded By, (t) = Taken By, (c) = Cosigned By    Initials Name Provider Type    HC Aura Odom, PT Physical Therapist        Mobility     Row Name 20 1532          Bed Mobility Assessment/Treatment    Bed Mobility Assessment/Treatment  -- up in chair  -     Row Name 20 1532          Bed-Chair Transfer    Bed-Chair [place] (Transfers)  not tested  -     Row Name 20 1532          Sit-Stand Transfer    Sit-Stand [place] (Transfers)  independent  -     Row Name 20 1532          Gait/Stairs  Assessment/Training    Gait/Stairs Assessment/Training  gait/ambulation independence  -HC     [place] Level (Gait)  independent  -HC     Distance in Feet (Gait)  200 ft, 450 ft   -HC     Pattern (Gait)  step-through  -HC     Negotiation (Stairs)  stairs independence  -HC     [place] Level (Stairs)  supervision  -HC     Number of Steps (Stairs)  11  -HC     Ascending Technique (Stairs)  step-over-step  -HC     Descending Technique (Stairs)  step-over-step  -HC     Comment (Gait/Stairs)  Cardiac level V  -HC       User Key  (r) = Recorded By, (t) = Taken By, (c) = Cosigned By    Initials Name Provider Type     Aura Odom, PT Physical Therapist        Obj/Interventions     Row Name 05/07/20 1533          Static Sitting Balance    Level of [place] (Unsupported Sitting, Static Balance)  independent  -HC     Sitting Position (Unsupported Sitting, Static Balance)  sitting in chair  -     Row Name 05/07/20 1533          Dynamic Sitting Balance    Level of [place], Reaches Outside Midline (Sitting, Dynamic Balance)  independent  -HC     Sitting Position, Reaches Outside Midline (Sitting, Dynamic Balance)  sitting in chair  -     Row Name 05/07/20 1533          Static Standing Balance    Level of [place] (Supported Standing, Static Balance)  independent  -HC     Time Able to Maintain Position (Supported Standing, Static Balance)  3 to 4 minutes  -     Row Name 05/07/20 1533          Dynamic Standing Balance    Level of [place], Reaches Outside Midline (Standing, Dynamic Balance)  independent  -HC     Time Able to Maintain Position, Reaches Outside Midline (Standing, Dynamic Balance)  4 to 5 minutes  -       User Key  (r) = Recorded By, (t) = Taken By, (c) = Cosigned By    Initials Name Provider Type     Aura Odom, PT Physical Therapist        Goals/Plan     Row Name 05/07/20 1534          Transfer Goal 1 (PT)    Progress/Outcome (Transfer Goal 1, PT)  goal met  -      Row Name 05/07/20 1534          Gait Training Goal 1 (PT)    Progress/Outcome (Gait Training Goal 1, PT)  goal met  -HC     Row Name 05/07/20 1534          Stairs Goal 1 (PT)    Progress/Outcome (Stairs Goal 1, PT)  goal met  -HC       User Key  (r) = Recorded By, (t) = Taken By, (c) = Cosigned By    Initials Name Provider Type     Aura Odom, PT Physical Therapist        Clinical Impression     Row Name 05/07/20 1533          Pain Scale: Numbers Pre/Post-Treatment    Pain Scale: Numbers, Pretreatment  0/10 - no pain  -HC     Pain Scale: Numbers, Post-Treatment  0/10 - no pain  -HC     Row Name 05/07/20 1533          Physical Therapy Clinical Impression    Rehab Potential (PT Clinical Summary)  good, to achieve stated therapy goals  -     Row Name 05/07/20 1533          Positioning and Restraints    Pre-Treatment Position  sitting in chair/recliner  -HC     Post Treatment Position  chair  -HC     In Chair  notified nsg;call light within reach;encouraged to call for assist  -HC       User Key  (r) = Recorded By, (t) = Taken By, (c) = Cosigned By    Initials Name Provider Type     Aura Odom, PT Physical Therapist        Outcome Measures    No documentation.       Physical Therapy Education                 Title: PT OT SLP Therapies (In Progress)     Topic: Physical Therapy (Done)     Point: Mobility training (Done)     Description:   Instruct learner(s) on safety and technique for assisting patient out of bed, chair or wheelchair.  Instruct in the proper use of assistive devices, such as walker, crutches, cane or brace.              Patient Friendly Description:   It's important to get you on your feet again, but we need to do so in a way that is safe for you. Falling has serious consequences, and your personal safety is the most important thing of all.        When it's time to get out of bed, one of us or a family member will sit next to you on the bed to give you support.     If your doctor or  nurse tells you to use a walker, crutches, a cane, or a brace, be sure you use it every time you get out of bed, even if you think you don't need it.    Learning Progress Summary           Patient Acceptance, E, VU by  at 5/7/2020 1535    Acceptance, E, VU by  at 5/5/2020 1236                   Point: Precautions (Done)     Description:   Instruct learner(s) on prescribed precautions during mobility and gait tasks              Learning Progress Summary           Patient Acceptance, E, VU by  at 5/7/2020 1535    Acceptance, E, VU by  at 5/5/2020 1236                               User Key     Initials Effective Dates Name Provider Type [facility] 04/17/20 -  Aura Odom, PT Physical Therapist PT              PT Recommendation and Plan  Planned Therapy Interventions (PT Eval): balance training, gait training, postural re-education, transfer training, home exercise program, neuromuscular re-education, patient/family education, strengthening, stair training, bed mobility training  Outcome Summary/Treatment Plan (PT)  Anticipated Discharge Disposition (PT): home  Plan of Care Reviewed With: patient  Progress: improving  Outcome Summary: Pt able to complete transfers with indep and ambulation x450 ft with indep.  Pt able to ascend/descend 11 steps with SUP using one handrail for light UE support as needed.  Pt has met all therapy goals at this time.  Plan home with wife.  No further inpatient PT needs.  PPE donned: mask with faceshield, gloves.     Time Calculation:   PT Charges     Row Name 05/07/20 1533             Time Calculation    Start Time  1455  -      Stop Time  1524  -      Time Calculation (min)  29 min  -      PT Received On  05/07/20  -         Time Calculation- PT    Total Timed Code Minutes- PT  29 minute(s)  -        User Key  (r) = Recorded By, (t) = Taken By, (c) = Cosigned By    Initials Name Provider Type     Aura Odom, PT Physical Therapist        Therapy  Charges for Today     Code Description Service Date Service Provider Modifiers Qty    63251351569 HC GAIT TRAINING EA 15 MIN 5/7/2020 Aura Odom, PT GP 1    76054688339 HC PT THERAPEUTIC ACT EA 15 MIN 5/7/2020 Aura Odom, PT GP 1    87500399063 HC PT NEUROMUSC RE EDUCATION EA 15 MIN 5/7/2020 Aura Odom, PT GP 1               PT Discharge Summary  Anticipated Discharge Disposition (PT): home  Reason for Discharge: All goals achieved  Outcomes Achieved: Able to achieve all goals within established timeline  Discharge Destination: Home    Aura Odom, PT  5/7/2020

## 2020-05-07 NOTE — PROGRESS NOTES
"Cardiology Progress  Note      Patient Care Team:  Provider, No Known as PCP - General    PATIENT IDENTIFICATION  Name: Keshawn Lr  Age: 70 y.o.  Sex: male  :  1950  MRN: [id number]             REASON FOR FOLLOW-UP:  Non-ST elevation MI  Significant obstructive coronary disease involving the RCA, left circumflex artery ramus branch and also diagonal system status post CABG 2020  Preserved LV function        SUBJECTIVE    Patient reports sternal wound discomfort controlled with pain medication.  Denies any shortness of breath  No nausea, dizziness    REVIEW OF SYSTEMS:  Pertinent items are noted in HPI, all other systems reviewed and negative    OBJECTIVE   Patient now maintaining good hemodynamics off pacer.  Rhythm sinus, 70s.  Blood pressure 120s systolic.  Ambulating well    ASSESSMENT/PLAN    Coronary artery disease of native artery of native heart with stable angina pectoris (CMS/[facility])    Subendocardial MI first episode care (CMS/[facility])    Essential hypertension    Dyslipidemia    Coronary artery disease involving native coronary artery of native heart with unstable angina pectoris (CMS/[facility])    Recommendations:  Continue current cardiovascular therapies  Continue to increase activity as tolerated  Anticipate discharge tomorrow      Vital Signs  Visit Vitals  /69   Pulse 76   Temp 98.3 °F (36.8 °C)   Resp 17   Ht 188 cm (74.02\")   Wt 99.1 kg (218 lb 7.6 oz)   SpO2 98%   BMI 28.04 kg/m²     Oxygen Therapy  SpO2: 98 %  Pulse Oximetry Type: Continuous  Device (Oxygen Therapy): room air  Device (Oxygen Therapy): room air  Flow (L/min): 4  Flowsheet Rows      First Filed Value   Admission Height  188 cm (74\") Documented at 2020 1403   Admission Weight  95.7 kg (210 lb 15.7 oz) Documented at 2020 1403        Intake & Output (last 3 days)        07 -  0700  07 -  0700  07 -  0700 / 07 -  0700    P.O. 120 1080 480 240    I.V. (mL/kg) 2875 " "(30.8) 567 (5.7) 87 (0.9)     Blood 500       IV Piggyback 250       Total Intake(mL/kg) 3745 (40.1) 1647 (16.6) 567 (5.7) 240 (2.4)    Urine (mL/kg/hr) 2447 (1.1) 775 (0.3)      Other 1600       Blood 600       Chest Tube 340 100      Total Output 4987 875      Net -1242 +772 +567 +240            Urine Unmeasured Occurrence   4 x         Lines, Drains & Airways    Active LDAs     Name:   Placement date:   Placement time:   Site:   Days:    Peripheral IV 05/02/20 1428 Right Antecubital   05/02/20    1428    Antecubital   4    Pacer Wires   05/04/20    --    Atrial and Ventricular   3                       /69   Pulse 76   Temp 98.3 °F (36.8 °C)   Resp 17   Ht 188 cm (74.02\")   Wt 99.1 kg (218 lb 7.6 oz)   SpO2 98%   BMI 28.04 kg/m²   Intake/Output last 3 shifts:  I/O last 3 completed shifts:  In: 1734 [P.O.:1080; I.V.:654]  Out: 300 [Urine:300]  Intake/Output this shift:  I/O this shift:  In: 240 [P.O.:240]  Out: -     PHYSICAL EXAM:    General: Well-developed, well-nourished 70-year-old  male who is alert, cooperative, no distress, appears stated age  Head:  Normocephalic, atraumatic, mucous membranes moist  Eyes:  Conjunctiva/corneas clear, EOM's intact     Neck:  Supple,  bandages  Lungs: Clear to auscultation bilaterally, no wheezes rhonchi rales are noted  Chest wall: Sternal wound well approximated, no erythema or drainage.  Heart::  Regular rate and rhythm, S1 and S2 normal, no murmur, rub or gallop  Abdomen: Soft, non-tender, nondistended bowel sounds active  Extremities: No cyanosis, clubbing, or edema   Pulses: 2+ and symmetric all extremities  Skin:  No rashes or lesions  Neuro/psych: A&O x3. CN II through XII are grossly intact with appropriate affect      Scheduled Meds:        aspirin 81 mg Oral Daily   atorvastatin 40 mg Oral Nightly   chlorhexidine 15 mL Mouth/Throat Q12H   enoxaparin 40 mg Subcutaneous Daily   insulin lispro 0-9 Units Subcutaneous TID AC   lactulose 30 g Oral " Daily   metoprolol tartrate 12.5 mg Oral Q12H   mupirocin 1 application Each Nare BID   pantoprazole 40 mg Oral Q AM   polyethylene glycol 17 g Oral BID   senna 1 tablet Oral BID       Continuous Infusions:      insulin 0-50 Units/hr Last Rate: Stopped (05/07/20 0545)       PRN Meds:    dextrose  •  dextrose  •  glucagon (human recombinant)  •  HYDROcodone-acetaminophen **OR** HYDROcodone-acetaminophen  •  insulin lispro **AND** insulin lispro  •  insulin  •  ondansetron  •  potassium chloride **OR** potassium chloride  •  potassium chloride **OR** potassium chloride        Results Review:     I reviewed the patient's new clinical results.    CBC    Results from last 7 days   Lab Units 05/07/20  0431 05/06/20  0419 05/05/20  0303 05/05/20  0207 05/05/20  0117 05/04/20  2255 05/04/20 2027  05/04/20  1506  05/04/20  0330  05/03/20  0548 05/02/20  1442   WBC 10*3/mm3 12.50* 13.00* 11.00*  --   --   --   --   --  11.60*  --  8.00  --  6.90 6.80   HEMOGLOBIN g/dL 11.5* 11.8* 12.2*  --   --   --   --   --  12.0*  --  14.2  --  14.3 15.8   HEMOGLOBIN, POC g/dL  --   --   --  12.8 12.2 12.5 12.5   < >  --    < >  --    < >  --   --    PLATELETS 10*3/mm3 171 166 166  --   --   --   --   --  153  --  228  --  237 269    < > = values in this interval not displayed.     Cr Clearance Estimated Creatinine Clearance: 97.8 mL/min (by C-G formula based on SCr of 0.91 mg/dL).  Coag   Results from last 7 days   Lab Units 05/05/20  0303 05/04/20  1506 05/03/20  1217 05/03/20  0548 05/02/20  2206 05/02/20  1442   INR  1.13* 1.18*  --  1.06  --  1.00   APTT seconds  --  26.9 24.7* 42.3* 37.6* 25.2*     HbA1C   Lab Results   Component Value Date    HGBA1C 5.5 05/03/2020     Blood Glucose   Glucose   Date/Time Value Ref Range Status   05/07/2020 0738 102 70 - 105 mg/dL Final     Comment:     Serial Number: [id number]:  [id number]   05/07/2020 0540 109 (H) 70 - 105 mg/dL Final     Comment:     Serial Number: [id number]:   [id number]   05/07/2020 0221 104 70 - 105 mg/dL Final     Comment:     Serial Number: [id number]:  [id number]   05/06/2020 2341 155 (H) 70 - 105 mg/dL Final     Comment:     Serial Number: [id number]:  [id number]   05/06/2020 2029 134 (H) 70 - 105 mg/dL Final     Comment:     Serial Number: [id number]:  [id number]   05/06/2020 1744 99 70 - 105 mg/dL Final     Comment:     Serial Number: [id number]:  [id number]   05/06/2020 1642 134 (H) 70 - 105 mg/dL Final     Comment:     Serial Number: [id number]:  [id number]   05/06/2020 1406 137 (H) 70 - 105 mg/dL Final     Comment:     Serial Number: [id number]:  [id number]     Infection     CMP Results from last 7 days   Lab Units 05/07/20  0431 05/06/20  0419 05/05/20  0303 05/04/20  1506 05/04/20  0330 05/03/20  1334 05/03/20  0548 05/02/20  1442   SODIUM mmol/L 138 139 145 140  --  140 142 142   POTASSIUM mmol/L 4.1 3.7 4.1 4.5 4.0 4.2 4.7 4.0   CHLORIDE mmol/L 103 104 111* 108*  --  105 106 104   CO2 mmol/L 23.0 23.0 20.0* 21.0*  --  24.0 27.0 26.0   BUN mg/dL 31* 23 22 18  --  11 14 17   CREATININE mg/dL 0.91 0.87 1.03 1.26  --  0.93 1.01 1.01   GLUCOSE mg/dL 126* 146* 134* 78  --  121* 118* 108*   ALBUMIN g/dL  --   --  4.20 4.10  --  4.10  --   --    BILIRUBIN mg/dL  --   --   --   --   --  0.7  --   --    ALK PHOS U/L  --   --   --   --   --  82  --   --    AST (SGOT) U/L  --   --   --   --   --  35  --   --    ALT (SGPT) U/L  --   --   --   --   --  31  --   --      ABG  Results from last 7 days   Lab Units 05/05/20  0207 05/05/20  0117 05/04/20  2255 05/04/20 2027 05/04/20  1834 05/04/20  1719 05/04/20  1629   PH, ARTERIAL pH units 7.356 7.387 7.302* 7.301* 7.368 7.360 7.353   PCO2, ARTERIAL mm Hg 41.2 38.2 45.0 49.6* 41.7 40.0 41.6   PO2 ART mm Hg 112.5* 119.7* 128.9* 125.1* 98.5 98.8 139.5*   O2 SATURATION ART % 98.2* 98.6* 98.6* 98.4* 97.4 97.4 99.0*   BASE EXCESS ART mmol/L -2.4* -1.8* -4.2* -2.4* -1.4* -2.7* -2.4*     UA  Results  from last 7 days   Lab Units 05/03/20  1503   NITRITE UA  Negative   WBC UA /HPF None Seen   BACTERIA UA /HPF None Seen   SQUAM EPITHEL UA /HPF None Seen     LONNIE  No results found for: POCMETH, POCAMPHET, POCBARBITUR, POCBENZO, POCCOCAINE, POCOPIATES, POCOXYCODO, POCPHENCYC, POCPROPOXY, POCTHC, POCTRICYC  Lysis Labs Results from last 7 days   Lab Units 05/07/20  0431 05/06/20  0419 05/05/20  0303 05/05/20  0207 05/05/20  0117 05/04/20  2255 05/04/20  2027  05/04/20  1506  05/04/20  0330  05/03/20  1334 05/03/20  1217 05/03/20  0548 05/02/20  2206 05/02/20  1442   INR   --   --  1.13*  --   --   --   --   --  1.18*  --   --   --   --   --  1.06  --  1.00   APTT seconds  --   --   --   --   --   --   --   --  26.9  --   --   --   --  24.7* 42.3* 37.6* 25.2*   HEMOGLOBIN g/dL 11.5* 11.8* 12.2*  --   --   --   --   --  12.0*  --  14.2  --   --   --  14.3  --  15.8   HEMOGLOBIN, POC g/dL  --   --   --  12.8 12.2 12.5 12.5   < >  --    < >  --    < >  --   --   --   --   --    PLATELETS 10*3/mm3 171 166 166  --   --   --   --   --  153  --  228  --   --   --  237  --  269   CREATININE mg/dL 0.91 0.87 1.03  --   --   --   --   --  1.26  --   --   --  0.93  --  1.01  --  1.01    < > = values in this interval not displayed.     Radiology(recent) Xr Chest 1 View    Result Date: 5/6/2020  1.increased bibasilar opacities likely atelectasis with probable small pleural effusion left larger than right. 2.Cardiomegaly postoperative changes the heart. 3.No significant pneumothorax.  Electronically Signed By-Ama Pinzon MD On:5/6/2020 7:19 AM This report was finalized on [id number] by  Ama Pinzon MD.    Xr Chest 1 View    Result Date: 5/5/2020   1. Status post CABG. 2. Interim removal of the right-sided chest tube with no pneumothorax. 3. Mild bibasilar atelectasis.  Electronically Signed By-Vikas Plasencia On:5/5/2020 3:29 PM This report was finalized on [id number] by  Vikas Plasencia, .        Results from last 7 days   Lab Units  05/03/20  0548   TROPONIN T ng/mL 0.400*       Xrays, labs reviewed personally by physician.    ECG/EMG Results (most recent)     Procedure Component Value Units Date/Time    ECG 12 Lead [[id number]] Collected:  05/02/20 1412     Updated:  05/03/20 1258    Narrative:       HEART RATE= 69  bpm  RR Interval= 868  ms  AZ Interval= 210  ms  P Horizontal Axis= -8  deg  P Front Axis= 43  deg  QRSD Interval= 76  ms  QT Interval= 382  ms  QRS Axis= 9  deg  T Wave Axis= 25  deg  - NORMAL ECG -  Sinus rhythm  No previous ECG available for comparison  Electronically Signed By: Justin Toth ([facility]) 03-May-2020 12:58:00  Date and Time of Study: 2020-05-02 14:12:41    ECG 12 Lead [[id number]] Collected:  05/05/20 0525     Updated:  05/05/20 0526    Narrative:       HEART RATE= 52  bpm  RR Interval= 1168  ms  AZ Interval= 247  ms  P Horizontal Axis= 44  deg  P Front Axis= 24  deg  QRSD Interval= 81  ms  QT Interval= 406  ms  QRS Axis= 49  deg  T Wave Axis= 51  deg  - ABNORMAL ECG -  Sinus bradycardia  Atrial premature complex  Prolonged AZ interval  ST elevation suggests acute pericarditis  Electronically Signed By:   Date and Time of Study: 2020-05-05 05:25:00    ECG 12 Lead [[id number]] Collected:  05/04/20 1541     Updated:  05/05/20 1748    Narrative:       HEART RATE= 46  bpm  RR Interval= 1296  ms  AZ Interval= 232  ms  P Horizontal Axis= 46  deg  P Front Axis= 73  deg  QRSD Interval= 90  ms  QT Interval= 466  ms  QRS Axis= 52  deg  T Wave Axis= 0  deg  - ABNORMAL ECG -  Sinus bradycardia  First-degree AV block  When compared with ECG of 02-May-2020 14:12:41,  Significant rate decrease  Electronically Signed By: Steve Yan ([facility]) 05-May-2020 17:48:18  Date and Time of Study: 2020-05-04 15:41:37    ECG 12 Lead [[id number]] Collected:  05/06/20 0411     Updated:  05/06/20 1337    Narrative:       HEART RATE= 80  bpm  RR Interval= 756  ms  AZ Interval= 207  ms  P Horizontal Axis= -16  deg  P Front Axis= 52  deg  QRSD  Interval= 95  ms  QT Interval= 348  ms  QRS Axis= 19  deg  T Wave Axis= -28  deg  - NORMAL ECG -  Sinus rhythm  When compared with ECG of 05-May-2020 5:25:00,  Significant repolarization change  Electronically Signed By: Kenneth Magaña ([facility]) 06-May-2020 13:36:49  Date and Time of Study: 2020-05-06 04:11:30            Medication Review:   I have reviewed the patient's current medication list  Scheduled Meds:  aspirin 81 mg Oral Daily   atorvastatin 40 mg Oral Nightly   chlorhexidine 15 mL Mouth/Throat Q12H   enoxaparin 40 mg Subcutaneous Daily   insulin lispro 0-9 Units Subcutaneous TID AC   lactulose 30 g Oral Daily   metoprolol tartrate 12.5 mg Oral Q12H   mupirocin 1 application Each Nare BID   pantoprazole 40 mg Oral Q AM   polyethylene glycol 17 g Oral BID   senna 1 tablet Oral BID     Continuous Infusions:  insulin 0-50 Units/hr Last Rate: Stopped (05/07/20 0545)     PRN Meds:.dextrose  •  dextrose  •  glucagon (human recombinant)  •  HYDROcodone-acetaminophen **OR** HYDROcodone-acetaminophen  •  insulin lispro **AND** insulin lispro  •  insulin  •  ondansetron  •  potassium chloride **OR** potassium chloride  •  potassium chloride **OR** potassium chloride    Imaging:  Imaging Results (Last 72 Hours)     Procedure Component Value Units Date/Time    XR Chest 1 View [[id number]] Collected:  05/06/20 0715     Updated:  05/06/20 0721    Narrative:       DATE OF EXAM:  5/6/2020 5:25 AM     PROCEDURE:  XR CHEST 1 VW-     INDICATIONS:  Post-Op Heart Surgery; I21.4-Non-ST elevation (NSTEMI) myocardial  infarction; I25.110-Atherosclerotic heart disease of native coronary  artery with unstable angina pectoris     COMPARISON:  05/05/2020     TECHNIQUE:   Single radiographic view of the chest was obtained.     FINDINGS:  The right internal jugular catheter has unchanged position. Heart is  enlarged changes of CABG. There is increased bibasilar opacities likely  atelectasis. Probable small pleural effusions. No  significant  pneumothorax is seen.       Impression:       1.increased bibasilar opacities likely atelectasis with probable small  pleural effusion left larger than right.  2.Cardiomegaly postoperative changes the heart.  3.No significant pneumothorax.     Electronically Signed By-Ama Pinzon MD On:5/6/2020 7:19 AM  This report was finalized on [id number] by  Ama Pinzon MD.    XR Chest 1 View [[id number]] Collected:  05/05/20 1527     Updated:  05/05/20 1531    Narrative:       DATE OF EXAM:  5/5/2020 3:12 PM     PROCEDURE:  XR CHEST 1 VW-     INDICATIONS:  chest tube removal; I21.4-Non-ST elevation (NSTEMI) myocardial  infarction; I25.110-Atherosclerotic heart disease of native coronary  artery with unstable angina pectoris     COMPARISON:  05/05/2020 5:59 AM     TECHNIQUE:   Single radiographic AP view of the chest was obtained.     FINDINGS:  There has been interim removal of the patient's right-sided chest tube.  No pneumothorax is identified. Cardiac size is enlarged and  postoperative changes of prior sternotomy. The [place]-Anj[place] catheter has  been removed and the sheath remains in the right internal jugular vein.  There is mild bibasilar atelectasis.        Impression:          1. Status post CABG.  2. Interim removal of the right-sided chest tube with no pneumothorax.  3. Mild bibasilar atelectasis.     Electronically Signed By-Vikas Plasencia On:5/5/2020 3:29 PM  This report was finalized on [id number] by  Vikas Plasencia, .    XR Chest 1 View [[id number]] Collected:  05/05/20 0834     Updated:  05/05/20 0840    Narrative:       DATE OF EXAM:  5/5/2020 5:56 AM     PROCEDURE:  XR CHEST 1 VW-     INDICATIONS:  Post-Op Heart Surgery; I21.4-Non-ST elevation (NSTEMI) myocardial  infarction; I25.110-Atherosclerotic heart disease of native coronary  artery with unstable angina pectoris     COMPARISON: 05/04/2020 TECHNIQUE:   Single radiographic view of the chest was obtained.     FINDINGS:  Sternotomy wires again  overlie the midline. ET tube and NG tube have  been removed. Right IJ introducer sheath and [place] catheter again  noted with the tip projecting over the pulmonary outflow. Left basilar  and midline chest tubes and right basilar chest tube again noted. No  pneumothorax. Pulmonary vascularity is within normal limits. There is a  small left pleural effusion. Bibasilar linear opacities suggesting minor  atelectasis. Heart size and mediastinal contour appear stable. Probable  calcified right paratracheal lymph node. There are degenerative changes  in the shoulders and spine.       Impression:          1. ET tube and NG tube have been removed.  2. Additional tubes and lines as above.  3. Minor bibasilar atelectasis with small left effusion.     Electronically Signed By-Hero Kumar On:5/5/2020 8:38 AM  This report was finalized on [id number] by  Hero Kumar, .    XR Chest 1 View [[id number]] Collected:  05/04/20 1631     Updated:  05/04/20 1635    Narrative:       DATE OF EXAM:  5/4/2020 4:13 PM     PROCEDURE:  XR CHEST 1 VW-     INDICATIONS:  Myocardial infarction, status post a CABG procedure, postoperative  follow-up.      COMPARISON:  05/02/2020.     TECHNIQUE:   Single radiographic view of the chest was obtained.     FINDINGS:  The patient's had an interval CABG procedure. There are 2 mediastinal  chest tubes in place as well as a right-sided chest tube. There is no  pneumothorax. The endotracheal tube tip is in good position terminating  at the level of the aortic knob. The [place] catheter terminates in  the main pulmonary artery segment. There is a nasogastric tube in place  with its tip terminating in the fundus of the stomach.  The pulmonary  vascular markings are normal. There are patchy linear densities in the  right lung base consistent with subsegmental atelectasis. There may be a  tiny right pleural effusion.       Impression:          1. Interval CABG procedure.  2. The lines and support tubes are  "in good position as described.  3. No pneumothorax.  4. Mild right basilar subsegmental atelectasis with suggestion of a tiny  pleural effusion.     Electronically Signed By-Arya Casper On:5/4/2020 4:33 PM  This report was finalized on [id number] by  Arya Casper, .            EMMA Castillo  05/07/20  08:48       EMR Dragon/Transcription:   \"Dictated utilizing Dragon dictation\".           Electronically signed by EMMA Castillo, 05/07/20, 8:48 AM.    "

## 2020-05-07 NOTE — PLAN OF CARE
Problem: Patient Care Overview  Goal: Plan of Care Review  Outcome: Outcome(s) achieved  Flowsheets (Taken 5/7/2020 9233)  Progress: improving  Plan of Care Reviewed With: patient  Outcome Summary: Pt able to complete transfers with indep and ambulation x450 ft with indep.  Pt able to ascend/descend 11 steps with SUP using one handrail for light UE support as needed.  Pt has met all therapy goals at this time.  Pt can be up ad josephine.  Pt able to state sternal precautions and follows precautions during functional mobility.  Plan home with wife.  No further inpatient PT needs.  PPE donned: mask with faceshield, gloves.

## 2020-05-07 NOTE — PROGRESS NOTES
S/P POD# 3 CABG with DAWNA--[place]  EF 55-60% (cath)    Subjective: Denies complaints. States he slept well overnight.    No events overnight  Drips: None      Intake/Output Summary (Last 24 hours) at 5/7/2020 0944  Last data filed at 5/7/2020 0820  Gross per 24 hour   Intake 567 ml   Output --   Net 567 ml     Temp:  [98.3 °F (36.8 °C)-99.4 °F (37.4 °C)] 98.3 °F (36.8 °C)  Heart Rate:  [64-86] 76  BP: ()/(52-77) 126/69    Results from last 7 days   Lab Units 05/07/20  0431 05/06/20  0419 05/05/20  0303  05/04/20  1506  05/03/20  0548   WBC 10*3/mm3 12.50* 13.00* 11.00*  --  11.60*   < > 6.90   HEMOGLOBIN g/dL 11.5* 11.8* 12.2*  --  12.0*   < > 14.3   HEMOGLOBIN, POC   --   --   --    < >  --    < >  --    HEMATOCRIT % 33.2* 34.1* 35.1*  --  35.3*   < > 41.3   HEMATOCRIT POC   --   --   --    < >  --    < >  --    PLATELETS 10*3/mm3 171 166 166  --  153   < > 237   INR   --   --  1.13*  --  1.18*  --  1.06    < > = values in this interval not displayed.     Results from last 7 days   Lab Units 05/07/20  0431  05/05/20  0303   CREATININE mg/dL 0.91   < > 1.03   POTASSIUM mmol/L 4.1   < > 4.1   SODIUM mmol/L 138   < > 145   MAGNESIUM mg/dL  --   --  2.6*   PHOSPHORUS mg/dL  --   --  4.2    < > = values in this interval not displayed.       Physical Exam:  Neuro intact, nad, up in chair  Tele: SR 70's  Diminished bases, 94% Room Air  Sternal incision healing well  Benign abd, no BM  No edema    Assessment/Plan:  Principal Problem:    Coronary artery disease of native artery of native heart with stable angina pectoris (CMS/HCC)  Active Problems:    Subendocardial MI first episode care (CMS/[facility])    Essential hypertension    Dyslipidemia    Coronary artery disease involving native coronary artery of native heart with unstable angina pectoris (CMS/[facility])    MV CAD,  EF 55-60%--s/p POD#3 CABG--asa/statin/BB  NSTEMI  HTN--stable  HLD--statin    Routine care  Mobilize  Add PO Lasix  DC AV Wires  Nocturnal  oximetry  Anticipate home at discharge-- possibly tomorrow    KRISTINA BULLOCK, APRN  5/7/2020  09:44

## 2020-05-07 NOTE — PLAN OF CARE
Problem: Patient Care Overview  Goal: Individualization and Mutuality  5/7/2020 1640 by Swomya Hidalgo RN  Outcome: Ongoing (interventions implemented as appropriate)  Note:   Pt doing very well today. States pain is much more controlled, has been staying at 2 or 3 out of 10 on pain scale. Two doses of PO Norco 5 today. Had several discussions r/t patient's concern with taking narcotics & potential risk for dependency. Pt had good questions r/t slowly stopping pain medication. Provided a lot of education & reassurance. Pt verbalized understanding. AV wires d/c'd. Betadine to L groin incision. Overnight sleep oximetry study ordered. Pt stated he has been keeping wife updated through text/calls. Will continue monitoring closely.    5/7/2020 1633 by Sowmya Hidalgo RN  Outcome: Ongoing (interventions implemented as appropriate)     Problem: Patient Care Overview  Goal: Discharge Needs Assessment  5/7/2020 1640 by Sowmya Hidalgo RN  Outcome: Ongoing (interventions implemented as appropriate)  5/7/2020 1633 by Sowmya Hidalgo RN  Outcome: Ongoing (interventions implemented as appropriate)     Problem: Patient Care Overview  Goal: Interprofessional Rounds/Family Conf  5/7/2020 1640 by Sowmya Hidalgo RN  Outcome: Ongoing (interventions implemented as appropriate)  5/7/2020 1633 by Sowmya Hidalgo RN  Outcome: Ongoing (interventions implemented as appropriate)     Problem: Cardiac: ACS (Acute Coronary Syndrome) (Adult)  Goal: Signs and Symptoms of Listed Potential Problems Will be Absent, Minimized or Managed (Cardiac: ACS)  5/7/2020 1640 by Sowmya Hidalgo RN  Outcome: Ongoing (interventions implemented as appropriate)  5/7/2020 1633 by Sowmya Hidalgo RN  Outcome: Ongoing (interventions implemented as appropriate)     Problem: Hypertensive Disease/Crisis (Arterial) (Adult)  Goal: Signs and Symptoms of Listed Potential Problems Will be Absent, Minimized or Managed (Hypertensive Disease/Crisis)  5/7/2020 1640 by Rocky  ASMITA Deng  Outcome: Ongoing (interventions implemented as appropriate)  5/7/2020 1633 by Sowmya Hidalgo RN  Outcome: Ongoing (interventions implemented as appropriate)     Problem: Cardiac Surgery (Adult)  Goal: Signs and Symptoms of Listed Potential Problems Will be Absent, Minimized or Managed (Cardiac Surgery)  5/7/2020 1640 by Sowmya Hidalgo RN  Outcome: Ongoing (interventions implemented as appropriate)  5/7/2020 1633 by Sowmya Hidalgo RN  Outcome: Ongoing (interventions implemented as appropriate)     Problem: Cardiac Surgery (Adult)  Goal: Anesthesia/Sedation Recovery  5/7/2020 1640 by Sowmya Hidalgo RN  Outcome: Ongoing (interventions implemented as appropriate)  5/7/2020 1633 by Sowmya Hidalgo RN  Outcome: Ongoing (interventions implemented as appropriate)     Problem: Chest Tube Drainage Device (Adult)  Goal: Signs and Symptoms of Listed Potential Problems Will be Absent, Minimized or Managed (Chest Tube Drainage Device)  5/7/2020 1640 by Sowmya Hidalgo RN  Outcome: Ongoing (interventions implemented as appropriate)  5/7/2020 1633 by Sowmya Hidalgo RN  Outcome: Ongoing (interventions implemented as appropriate)     Problem: Skin Injury Risk (Adult)  Goal: Identify Related Risk Factors and Signs and Symptoms  5/7/2020 1640 by Sowmya Hidalgo RN  Outcome: Ongoing (interventions implemented as appropriate)  5/7/2020 1633 by Sowmya Hidalgo RN  Outcome: Ongoing (interventions implemented as appropriate)     Problem: Skin Injury Risk (Adult)  Goal: Skin Health and Integrity  5/7/2020 1640 by Sowmya Hidalgo RN  Outcome: Ongoing (interventions implemented as appropriate)  5/7/2020 1633 by Sowmya Hidalgo RN  Outcome: Ongoing (interventions implemented as appropriate)     Problem: Fall Risk (Adult)  Goal: Identify Related Risk Factors and Signs and Symptoms  5/7/2020 1640 by Sowmya Hidalgo RN  Outcome: Ongoing (interventions implemented as appropriate)  5/7/2020 1633 by Sowmya Hidalgo RN  Outcome:  Ongoing (interventions implemented as appropriate)     Problem: Fall Risk (Adult)  Goal: Absence of Fall  5/7/2020 1640 by Sowmya Hidalgo, RN  Outcome: Ongoing (interventions implemented as appropriate)  5/7/2020 1633 by Sowmya Hidalgo, RN  Outcome: Ongoing (interventions implemented as appropriate)

## 2020-05-08 ENCOUNTER — READMISSION MANAGEMENT (OUTPATIENT)
Dept: CALL CENTER | Facility: HOSPITAL | Age: 70
End: 2020-05-08

## 2020-05-08 VITALS
OXYGEN SATURATION: 95 % | DIASTOLIC BLOOD PRESSURE: 62 MMHG | RESPIRATION RATE: 17 BRPM | BODY MASS INDEX: 27.42 KG/M2 | HEART RATE: 72 BPM | WEIGHT: 213.63 LBS | HEIGHT: 74 IN | TEMPERATURE: 98.3 F | SYSTOLIC BLOOD PRESSURE: 107 MMHG

## 2020-05-08 PROBLEM — Z95.1 S/P CABG X 4: Status: ACTIVE | Noted: 2020-05-08

## 2020-05-08 LAB
ANION GAP SERPL CALCULATED.3IONS-SCNC: 12 MMOL/L (ref 5–15)
BUN BLD-MCNC: 21 MG/DL (ref 8–23)
BUN/CREAT SERPL: 22.8 (ref 7–25)
CALCIUM SPEC-SCNC: 8.4 MG/DL (ref 8.6–10.5)
CHLORIDE SERPL-SCNC: 100 MMOL/L (ref 98–107)
CO2 SERPL-SCNC: 25 MMOL/L (ref 22–29)
CREAT BLD-MCNC: 0.92 MG/DL (ref 0.76–1.27)
DEPRECATED RDW RBC AUTO: 45.9 FL (ref 37–54)
ERYTHROCYTE [DISTWIDTH] IN BLOOD BY AUTOMATED COUNT: 14 % (ref 12.3–15.4)
GFR SERPL CREATININE-BSD FRML MDRD: 82 ML/MIN/1.73
GLUCOSE BLD-MCNC: 128 MG/DL (ref 65–99)
GLUCOSE BLDC GLUCOMTR-MCNC: 113 MG/DL (ref 70–105)
GLUCOSE BLDC GLUCOMTR-MCNC: 87 MG/DL (ref 70–105)
HCT VFR BLD AUTO: 35.4 % (ref 37.5–51)
HGB BLD-MCNC: 12 G/DL (ref 13–17.7)
MCH RBC QN AUTO: 31.7 PG (ref 26.6–33)
MCHC RBC AUTO-ENTMCNC: 33.9 G/DL (ref 31.5–35.7)
MCV RBC AUTO: 93.5 FL (ref 79–97)
PLATELET # BLD AUTO: 235 10*3/MM3 (ref 140–450)
PMV BLD AUTO: 9.1 FL (ref 6–12)
POTASSIUM BLD-SCNC: 3.8 MMOL/L (ref 3.5–5.2)
RBC # BLD AUTO: 3.79 10*6/MM3 (ref 4.14–5.8)
SODIUM BLD-SCNC: 137 MMOL/L (ref 136–145)
WBC NRBC COR # BLD: 10.7 10*3/MM3 (ref 3.4–10.8)

## 2020-05-08 PROCEDURE — 80048 BASIC METABOLIC PNL TOTAL CA: CPT | Performed by: NURSE PRACTITIONER

## 2020-05-08 PROCEDURE — 82962 GLUCOSE BLOOD TEST: CPT

## 2020-05-08 PROCEDURE — 85027 COMPLETE CBC AUTOMATED: CPT | Performed by: NURSE PRACTITIONER

## 2020-05-08 RX ORDER — ASPIRIN 81 MG/1
81 TABLET ORAL DAILY
Qty: 30 TABLET | Refills: 3 | Status: SHIPPED | OUTPATIENT
Start: 2020-05-09

## 2020-05-08 RX ORDER — ATORVASTATIN CALCIUM 40 MG/1
40 TABLET, FILM COATED ORAL NIGHTLY
Qty: 30 TABLET | Refills: 3 | Status: SHIPPED | OUTPATIENT
Start: 2020-05-08 | End: 2020-05-22 | Stop reason: SDUPTHER

## 2020-05-08 RX ORDER — POTASSIUM CHLORIDE 20 MEQ/1
20 TABLET, EXTENDED RELEASE ORAL DAILY
Qty: 5 TABLET | Refills: 0 | Status: SHIPPED | OUTPATIENT
Start: 2020-05-09 | End: 2020-05-22

## 2020-05-08 RX ORDER — HYDROCODONE BITARTRATE AND ACETAMINOPHEN 5; 325 MG/1; MG/1
1 TABLET ORAL EVERY 6 HOURS PRN
Qty: 30 TABLET | Refills: 0 | Status: SHIPPED | OUTPATIENT
Start: 2020-05-08 | End: 2020-05-15

## 2020-05-08 RX ORDER — FUROSEMIDE 40 MG/1
40 TABLET ORAL DAILY
Qty: 5 TABLET | Refills: 0 | Status: SHIPPED | OUTPATIENT
Start: 2020-05-09 | End: 2020-05-22

## 2020-05-08 RX ADMIN — ASPIRIN 81 MG: 81 TABLET, COATED ORAL at 08:00

## 2020-05-08 RX ADMIN — HYDROCODONE BITARTRATE AND ACETAMINOPHEN 1 TABLET: 5; 325 TABLET ORAL at 09:44

## 2020-05-08 RX ADMIN — METOPROLOL TARTRATE 12.5 MG: 25 TABLET, FILM COATED ORAL at 08:00

## 2020-05-08 RX ADMIN — FUROSEMIDE 40 MG: 40 TABLET ORAL at 08:00

## 2020-05-08 RX ADMIN — PANTOPRAZOLE SODIUM 40 MG: 40 TABLET, DELAYED RELEASE ORAL at 04:03

## 2020-05-08 RX ADMIN — HYDROCODONE BITARTRATE AND ACETAMINOPHEN 1 TABLET: 5; 325 TABLET ORAL at 04:02

## 2020-05-08 RX ADMIN — MUPIROCIN 1 APPLICATION: 20 OINTMENT TOPICAL at 08:00

## 2020-05-08 RX ADMIN — POTASSIUM CHLORIDE 10 MEQ: 750 TABLET, EXTENDED RELEASE ORAL at 08:00

## 2020-05-08 NOTE — PLAN OF CARE
Pt up in chair and appeared asleep most of the night. Pt is a standby assist when ambulating. Vitals are stable.

## 2020-05-08 NOTE — PROGRESS NOTES
Continued Stay Note  [facility]     Patient Name: Keshawn Lr  MRN: [id number]  Today's Date: 5/8/2020    Admit Date: 5/2/2020    Discharge Plan     Row Name 05/08/20 1124       Plan    Plan  Home with spouse with possible home health (read home health list over the phone to the wife, educated about medicare.gov). Waiting on Wife to call back with Home Health choice. Referral to [facility] for [facility].      Provided Post Acute Provider List?  Yes    Post Acute Provider List  Home Health    Delivered To  Support Person    Method of Delivery  Telephone    Plan Comments  Barriers to discharge: POD#4 CABG with DAWNA. Possible discharge today.             Expected Discharge Date and Time     Expected Discharge Date Expected Discharge Time    May 8, 2020               Anna Naegele [place] [address]   [phone]  office  [phone]  fax  [email or web address]  [facility]

## 2020-05-08 NOTE — PROGRESS NOTES
Continued Stay Note  [facility]     Patient Name: Keshawn Lr  MRN: [id number]  Today's Date: 5/8/2020    Admit Date: 5/2/2020    Discharge Plan     Row Name 05/08/20 1146       Plan    Plan  Home with spouse and [facility]  Home Health (referral and order in [facility]).  Referral to [facility] for [facility] (order in [facility]).    Plan Comments  Possible discharge today                     Provided Post Acute Provider List?  Yes    Post Acute Provider List  Home Health    Delivered To  Support Person    Method of Delivery  Telephone    Plan Comments  Barriers to discharge: POD#4 CABG with DAWNA. Possible discharge today.           Expected Discharge Date and Time     Expected Discharge Date Expected Discharge Time    May 8, 2020               Anna Naegele [place] [address]   [phone]  office  [phone]  fax  [email or web address]  [facility]

## 2020-05-08 NOTE — DISCHARGE SUMMARY
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 male presented to [facility] ED with complaints of chest pain and ruled in for NSTEMI. He was taken to the cath lab and found to have multivessel CAD. [facility] [place], operated by [facility] Cardiac Surgery was consulted and on 5/4/2020 he underwent CABG X4. He was transferred to the recovery room in stable condition and extubated shortly after. He recovered well and suffered no postop complications. He passed a nocturnal oximetry prior to discharge and will not require home oxygen. He is being discharged to home with family and home health.    Procedures Performed  Procedure(s):  5/4/2020-- CORONARY ARTERY BYPASS WITH INTERNAL MAMMARY ARTERY GRAFT  TRANSESOPHAGEAL ECHOCARDIOGRAM WITH ANESTHESIA       Consults:   Consults     Date and Time Order Name Status Description    5/4/2020 1457 Inpatient Cardiology Consult      5/3/2020 1241 Inpatient Cardiothoracic Surgery Consult Completed     5/2/2020 1531 Cardiology (on-call MD unless specified) Completed     5/2/2020 1531 Hospitalist (on-call MD unless specified) Completed           Pertinent Test Results:    Lab Results   Component Value Date    WBC 10.70 05/08/2020    HGB 12.0 (L) 05/08/2020    HCT 35.4 (L) 05/08/2020    MCV 93.5 05/08/2020     05/08/2020      Lab Results   Component Value Date    GLUCOSE 128 (H) 05/08/2020    CALCIUM 8.4 (L) 05/08/2020     05/08/2020    K 3.8 05/08/2020    CO2 25.0 05/08/2020     05/08/2020    BUN 21 05/08/2020    CREATININE 0.92 05/08/2020    EGFRIFNONA 82 05/08/2020    BCR 22.8 05/08/2020    ANIONGAP 12.0 05/08/2020     Lab Results   Component Value Date    INR 1.13 (H) 05/05/2020    PROTIME 11.6 05/05/2020          Condition on Discharge: Stable for discharge to home with family and home health    Vital Signs  Temp:  [97.8 °F (36.6 °C)-99 °F (37.2 °C)] 97.9 °F (36.6 °C)  Heart Rate:  [63-79] 66  BP: (103-141)/(56-81) 108/56      Discharge Disposition  Home-Health Care [facility]    Discharge Medications     Discharge Medications      New Medications      Instructions Start Date   aspirin 81 MG EC tablet   81 mg, Oral, Daily   Start Date:  May 9, 2020     atorvastatin 40 MG tablet  Commonly known as:  LIPITOR   40 mg, Oral, Nightly      furosemide 40 MG tablet  Commonly known as:  LASIX   40 mg, Oral, Daily   Start Date:  May 9, 2020     HYDROcodone-acetaminophen 5-325 MG per tablet  Commonly known as:  NORCO   1 tablet, Oral, Every 6 Hours PRN      potassium chloride 20 MEQ CR tablet  Commonly known as:  K-DUR,KLOR-CON   20 mEq, Oral, Daily   Start Date:  May 9, 2020        Changes to Medications      Instructions Start Date   metoprolol tartrate 25 MG tablet  Commonly known as:  LOPRESSOR  What changed:    · how much to take  · when to take this   12.5 mg, Oral, Every 12 Hours Scheduled         Continue These Medications      Instructions Start Date   valACYclovir 500 MG tablet  Commonly known as:  VALTREX   1,000 mg, Oral, 2 Times Daily PRN         Stop These Medications    lisinopril 10 MG tablet  Commonly known as:  PRINIVIL,ZESTRIL     meloxicam 15 MG tablet  Commonly known as:  MOBIC     NON FORMULARY            Discharge Diet:   Healthy Heart Diet    Activity at Discharge:   As tolerated, no lifting > 10 pounds x 6 weeks    Follow-up Appointments  Future Appointments   Date Time Provider Department Center   6/15/2020 11:15 AM Grecia Isaacs APRN [facility] None     Additional Instructions for the Follow-ups that You Need to Schedule     Ambulatory Referral to Cardiac Rehab   As directed      Discharge Follow-up with PCP   As directed       Currently Documented PCP:    Provider, No Known    PCP Phone  Number:    [phone]     Follow Up Details:  in one month         Discharge Follow-up with Specialty: Cardiology; 2 Weeks   As directed      Specialty:  Cardiology    Follow Up:  2 Weeks    Follow Up Details:  Follow up with cardiologist Dr. Hall in 2 weeks.         Discharge Follow-up with Specified Provider: Cardiac Surgery; 6 Weeks   As directed      To:  Cardiac Surgery    Follow Up:  6 Weeks    Follow Up Details:  Follow up with [facility] on 6/15/2020 at 11am.         Referral to Home Health   As directed      Face to Face Visit Date:  5/8/2020    Follow-up provider for Plan of Care?:  I will be treating the patient on an ongoing basis.  Please send me the Plan of Care for signature.    Follow-up provider:  DAHLIA MONCADA [[id number]]    Reason/Clinical Findings:  S/P Cardiac Surgery    Describe mobility limitations that make leaving home difficult:  S/p Cardiac Surgery    Nursing/Therapeutic Services Requested:  Skilled Nursing    Skilled nursing orders:  Other (Post Cardiac Surgery Care)    Frequency:  Other (3 times weekly)         Call MD With Problems / Concerns   May 09, 2020      Instructions: Call for temp >101 or surgical wound drainage    Order Comments:  Instructions: Call for temp >101 or surgical wound drainage                Test Results Pending at Discharge    STSinfomation:  Patient discharged on aspirin, statin, and beta blocker.       EMMA RAND  05/08/20  10:42

## 2020-05-08 NOTE — SIGNIFICANT NOTE
05/08/20 1246   Discharge of Care   Discharge Mode wheel chair   Discharge Destination home   Discharged Accompanied by spouse   Discharge Contact Information if Applicable Sadaf [phone]   Discharge Teaching Done  Yes   Learning Method Explanation;Teach Back;Written Materials   All discharge information given to patient. All questions answered at this time. Patient was provided unit phone number in case of further questions.

## 2020-05-08 NOTE — DISCHARGE PLACEMENT REQUEST
"Sindi Warner (70 y.o. Male)     Date of Birth Social Security Number Address Home Phone MRN    1950  [address]  [place] [address]  [id number]    Rastafarian Marital Status          Taoism        Admission Date Admission Type Admitting Provider Attending Provider Department, Room/Bed    5/2/20 Emergency Yahir Ferris MD Pagni, Sebastian, MD [facility] CARDIOVASCULAR CARE UNIT, 2203/1    Discharge Date Discharge Disposition Discharge Destination         Home-Health Care [facility]              Attending Provider:  Yahir Ferris MD    Allergies:  Neosporin [Bacitracin-polymyxin B]    Isolation:  None   Infection:  None   Code Status:  CPR    Ht:  188 cm (74.02\")   Wt:  96.9 kg (213 lb 10 oz)    Admission Cmt:  None   Principal Problem:  Coronary artery disease of native artery of native heart with stable angina pectoris (CMS/[facility]) [I25.118]                 Active Insurance as of 5/2/2020     Primary Coverage     Payor Plan Insurance Group Employer/Plan Group    MEDICARE MEDICARE A ONLY      Payor Plan Address Payor Plan Phone Number Payor Plan Fax Number Effective Dates    [address] [phone]  4/1/2017 - None Entered    [facility] [id number]       Subscriber Name Subscriber Birth Date Member ID       SINDI WARNER 1950 [id number]           Secondary Coverage     Payor Plan Insurance Group Employer/Plan Group    HUMANA HUMANA [id number]     Payor Plan Address Payor Plan Phone Number Payor Plan Fax Number Effective Dates    [address] [address] [phone]  1/2/2017 - None Entered    [facility] [phone]       Subscriber Name Subscriber Birth Date Member ID       KORTNEY WARNER 1950 [id number]                 Emergency Contacts      (Rel.) Home Phone Work Phone Mobile Phone    KORTNEY WARNER (Spouse) -- -- [phone]              "

## 2020-05-08 NOTE — SIGNIFICANT NOTE
05/08/20 1245   Family Notification   Reason for Communication Patient discharge   Family Member's Name Sadaf   Family Member's Relationship to Patient Spouse   Notification Route Other (Comment)  (Patient called )   Patient called his wife to inform that he is discharging.

## 2020-05-08 NOTE — DISCHARGE PLACEMENT REQUEST
"Sindi Warner (70 y.o. Male)     Date of Birth Social Security Number Address Home Phone MRN    1950  [address]  [place] [address]  [id number]    Gnosticism Marital Status          Alevism        Admission Date Admission Type Admitting Provider Attending Provider Department, Room/Bed    5/2/20 Emergency Yahir Ferris MD Pagni, Sebastian, MD [facility] CARDIOVASCULAR CARE UNIT, 2203/1    Discharge Date Discharge Disposition Discharge Destination         Home-Health Care [facility]              Attending Provider:  Yahir Ferris MD    Allergies:  Neosporin [Bacitracin-polymyxin B]    Isolation:  None   Infection:  None   Code Status:  CPR    Ht:  188 cm (74.02\")   Wt:  96.9 kg (213 lb 10 oz)    Admission Cmt:  None   Principal Problem:  Coronary artery disease of native artery of native heart with stable angina pectoris (CMS/[facility]) [I25.118]                 Active Insurance as of 5/2/2020     Primary Coverage     Payor Plan Insurance Group Employer/Plan Group    MEDICARE MEDICARE A ONLY      Payor Plan Address Payor Plan Phone Number Payor Plan Fax Number Effective Dates    [address] [phone]  4/1/2017 - None Entered    [facility] [id number]       Subscriber Name Subscriber Birth Date Member ID       SINDI WARNER 1950 [id number]           Secondary Coverage     Payor Plan Insurance Group Employer/Plan Group    HUMANA HUMANA [id number]     Payor Plan Address Payor Plan Phone Number Payor Plan Fax Number Effective Dates    [address] [address] [phone]  1/2/2017 - None Entered    [facility] [phone]       Subscriber Name Subscriber Birth Date Member ID       KORTNEY WARNER 1950 [id number]                 Emergency Contacts      (Rel.) Home Phone Work Phone Mobile Phone    KORTNEY WARNER (Spouse) -- -- [phone]              "

## 2020-05-09 PROCEDURE — 93010 ELECTROCARDIOGRAM REPORT: CPT | Performed by: INTERNAL MEDICINE

## 2020-05-09 NOTE — OUTREACH NOTE
Prep Survey      Responses   [facility]   Is LACE score < 7 ?  No   Eligibility  Readm Mgmt   Discharge diagnosis  CABG this visit   COVID-19 Test Status  Negative   Does the patient have one of the following disease processes/diagnoses(primary or secondary)?  Cardiothoracic surgery   Does the patient have Home health ordered?  Yes   What is the Home health agency?   [facility]   Is there a DME ordered?  No   Prep survey completed?  Yes          Siri Chen RN

## 2020-05-11 NOTE — PROGRESS NOTES
Case Management Discharge Note      Final Note: Home with [facility]. Patient declined 3n1.     Provided Post Acute Provider List?: Yes  Post Acute Provider List: Home Health  Delivered To: Support Person  Method of Delivery: Telephone          Home Medical Care      Service Provider Request Status Selected Services Address Phone Number Fax Number    [facility] [address], [facility] [address] [phone]           Final Discharge Disposition Code: 06 - home with home health care

## 2020-05-12 ENCOUNTER — READMISSION MANAGEMENT (OUTPATIENT)
Dept: CALL CENTER | Facility: HOSPITAL | Age: 70
End: 2020-05-12

## 2020-05-12 NOTE — OUTREACH NOTE
CT Surgery Week 1 Survey      Responses   [facility] patient discharged from?  [facility]   Does the patient have one of the following disease processes/diagnoses(primary or secondary)?  Cardiothoracic surgery   Is there a successful TCM telephone encounter documented?  No   Week 1 attempt successful?  Yes   Call start time  0929   Call end time  0933   Meds reviewed with patient/caregiver?  Yes   Is the patient having any side effects they believe may be caused by any medication additions or changes?  No   Does the patient have all medications related to this admission filled (includes all antibiotics, pain medications, cardiac medications, etc.)  Yes   Is the patient taking all medications as directed (includes completed medication regime)?  Yes   Does the patient have an appointment scheduled with their C/T surgeon?  Yes   Comments regarding PCP  PATIENT HAS AN APPOINTMENT A WITH NP AT CT SURGEON'S OFFICE ON 6/15   Has the patient kept scheduled appointments due by today?  N/A   What is the Home health agency?   [facility]   Has home health visited the patient within 72 hours of discharge?  Yes   Did the patient receive a copy of their discharge instructions?  Yes   Nursing interventions  Reviewed instructions with patient   What is the patient's perception of their health status since discharge?  Improving   Nursing interventions  Nurse provided patient education   Is the patient/caregiver able to teach back normal signs of recovery?  Depression or irritability, Nausea and lack of appetite, Pain or discomfort at incisional site, Constipation   Is the patient /caregiver able to teach back basic post-op care?  Continue use of incentive spirometry at least 1 week post discharge, Practice 'cough and deep breath', Drive as instructed by MD in discharge instructions, Take showers only when approved by MD-sponge bathe until then, No tub bath, swimming, or hot tub until instructed by MD, Keep incision areas clean, dry  and protected, Do not remove steri-strips, Lifting as instructed by MD in discharge instructions   Is the patient/caregiver able to teach back signs and symptoms of incisional infection?  Increased redness, swelling or pain at the incisonal site, Fever, Incisional warmth, Increased drainage or bleeding, Pus or odor from incision   Is the patient/caregiver able to teach back steps to recovery at home?  Set small, achievable goals for return to baseline health, Rest and rebuild strength, gradually increase activity, Make a list of questions for surgeon's appointment   Is the patient /caregiver able to teach back the importance of cardiac rehab?  Yes   Nursing interventions  Provided education on importance of cardiac rehab   Is the patient/caregiver able to teach back the hierarchy of who to call/visit for symptoms/problems? PCP, Specialist, Home health nurse, Urgent Care, ED, 911  Yes   Week 1 call completed?  Yes            Danitza Owens LPN

## 2020-05-19 ENCOUNTER — READMISSION MANAGEMENT (OUTPATIENT)
Dept: CALL CENTER | Facility: HOSPITAL | Age: 70
End: 2020-05-19

## 2020-05-19 NOTE — OUTREACH NOTE
CT Surgery Week 2 Survey      Responses   [facility] patient discharged from?  [facility]   Does the patient have one of the following disease processes/diagnoses(primary or secondary)?  Cardiothoracic surgery   Week 2 attempt successful?  Yes   Call start time  1359   Call end time  1401   Discharge diagnosis  CABG this visit   Meds reviewed with patient/caregiver?  Yes   Is the patient having any side effects they believe may be caused by any medication additions or changes?  No   Does the patient have all medications related to this admission filled (includes all antibiotics, pain medications, cardiac medications, etc.)  Yes   Is the patient taking all medications as directed (includes completed medication regime)?  Yes   Does the patient have a primary care provider?   Yes   Does the patient have an appointment scheduled with their C/T surgeon?  Yes   Has the patient kept scheduled appointments due by today?  Yes   What is the Home health agency?   [facility]    Has home health visited the patient within 72 hours of discharge?  Yes   Psychosocial issues?  No   Did the patient receive a copy of their discharge instructions?  Yes   Nursing interventions  Reviewed instructions with patient   What is the patient's perception of their health status since discharge?  Improving   Nursing interventions  Nurse provided patient education   Is the patient/caregiver able to teach back normal signs of recovery?  Depression or irritability, Nausea and lack of appetite, Pain or discomfort at incisional site, Constipation   Nursing interventions  Reassured on normal signs of recovery   Is the patient /caregiver able to teach back basic post-op care?  Continue use of incentive spirometry at least 1 week post discharge, Practice 'cough and deep breath', Drive as instructed by MD in discharge instructions, Take showers only when approved by MD-sponge bathe until then, No tub bath, swimming, or hot tub until instructed by MD, Keep  incision areas clean, dry and protected, Do not remove steri-strips, Lifting as instructed by MD in discharge instructions   Is the patient/caregiver able to teach back signs and symptoms of incisional infection?  Increased redness, swelling or pain at the incisonal site, Fever, Incisional warmth, Increased drainage or bleeding, Pus or odor from incision   Is the patient/caregiver able to teach back steps to recovery at home?  Set small, achievable goals for return to baseline health, Rest and rebuild strength, gradually increase activity, Make a list of questions for surgeon's appointment   Is the patient /caregiver able to teach back the importance of cardiac rehab?  Yes   Is the patient/caregiver able to teach back the hierarchy of who to call/visit for symptoms/problems? PCP, Specialist, Home health nurse, Urgent Care, ED, 911  Yes   Week 2 call completed?  Yes          Gab Gonsalves RN

## 2020-05-22 ENCOUNTER — OFFICE VISIT (OUTPATIENT)
Dept: CARDIOLOGY | Facility: CLINIC | Age: 70
End: 2020-05-22

## 2020-05-22 VITALS
RESPIRATION RATE: 18 BRPM | DIASTOLIC BLOOD PRESSURE: 88 MMHG | HEART RATE: 66 BPM | BODY MASS INDEX: 26.56 KG/M2 | WEIGHT: 207 LBS | SYSTOLIC BLOOD PRESSURE: 157 MMHG | OXYGEN SATURATION: 100 % | HEIGHT: 74 IN

## 2020-05-22 DIAGNOSIS — I25.10 CORONARY ARTERY DISEASE INVOLVING NATIVE CORONARY ARTERY OF NATIVE HEART WITHOUT ANGINA PECTORIS: Primary | ICD-10-CM

## 2020-05-22 DIAGNOSIS — I10 ESSENTIAL HYPERTENSION: Chronic | ICD-10-CM

## 2020-05-22 DIAGNOSIS — I21.4 SUBENDOCARDIAL MI FIRST EPISODE CARE (HCC): ICD-10-CM

## 2020-05-22 DIAGNOSIS — I25.118 CORONARY ARTERY DISEASE OF NATIVE ARTERY OF NATIVE HEART WITH STABLE ANGINA PECTORIS (HCC): ICD-10-CM

## 2020-05-22 DIAGNOSIS — I25.110 CORONARY ARTERY DISEASE INVOLVING NATIVE CORONARY ARTERY OF NATIVE HEART WITH UNSTABLE ANGINA PECTORIS (HCC): ICD-10-CM

## 2020-05-22 PROCEDURE — 93000 ELECTROCARDIOGRAM COMPLETE: CPT | Performed by: INTERNAL MEDICINE

## 2020-05-22 PROCEDURE — 99214 OFFICE O/P EST MOD 30 MIN: CPT | Performed by: INTERNAL MEDICINE

## 2020-05-22 RX ORDER — B-COMPLEX WITH VITAMIN C
TABLET ORAL
COMMUNITY

## 2020-05-22 RX ORDER — ATORVASTATIN CALCIUM 40 MG/1
40 TABLET, FILM COATED ORAL NIGHTLY
Qty: 90 TABLET | Refills: 3 | Status: SHIPPED | OUTPATIENT
Start: 2020-05-22 | End: 2020-08-12 | Stop reason: SDUPTHER

## 2020-05-22 NOTE — PROGRESS NOTES
"Cardiology Office Visit      Encounter Date:  05/22/2020    Patient ID:   Keshawn Lr is a 70 y.o. male.    Reason For Followup:  Coronary artery disease  Non-ST elevation myocardial infarction    Brief Clinical History:  Dear  Provider, No Known    I had the pleasure of seeing Keshawn Lr today. As you are well aware, this is a 70 y.o. male recent hospitalization at [facility] for non-ST elevation myocardial infarction diagnosed with severe three-vessel coronary artery disease resulted with coronary artery bypass surgery    Interval History:  Patient is clinically doing better  Denies any symptoms of chest pain shortness of breath dizziness or syncope    Assessment & Plan    Impressions:  Coronary artery disease  Non-ST elevation myocardial infarction  Hypertension  Hyperlipidemia  Multivessel coronary artery disease status post coronary artery bypass surgery     Multivessel coronary artery disease  Significant obstructive coronary artery disease involving the right coronary artery left circumflex artery ramus branch and also diagonal system  Normal LV systolic function  Normal wall motion  Normal left-sided filling pressures  Estimated LV ejection fraction of 55 to 60%    Recommendations:  Continue aspirin statin and beta-blocker  Recheck lipids in 3 months  Schedule for treadmill stress test for cardiac rehab  Continue close monitoring  Discussed with the patient at length  Wound site looks good with no hematoma or bleeding  Follow-up in office in 3 months    Objective:    Vitals:  Vitals:    05/22/20 1005   BP: 157/88   BP Location: Left arm   Pulse: 66   Resp: 18   SpO2: 100%   Weight: 93.9 kg (207 lb)   Height: 188 cm (74\")       Physical Exam:    General: Alert, cooperative, no distress, appears stated age  Head:  Normocephalic, atraumatic, mucous membranes moist  Eyes:  Conjunctiva/corneas clear, EOM's intact     Neck:  Supple,  no adenopathy;      Lungs: Clear to auscultation bilaterally, no " wheezes rhonchi rales are noted  Chest wall: No tenderness  Heart::  Regular rate and rhythm, S1 and S2 normal, no murmur, rub or gallop  Abdomen: Soft, non-tender, nondistended bowel sounds active  Extremities: No cyanosis, clubbing, or edema  Pulses: 2+ and symmetric all extremities  Skin:  No rashes or lesions  Neuro/psych: A&O x3. CN II through XII are grossly intact with appropriate affect      Allergies:  Allergies   Allergen Reactions   • Neosporin [Bacitracin-Polymyxin B] Other (See Comments)     Burning sensation        Medication Review:     Current Outpatient Medications:   •  aspirin 81 MG EC tablet, Take 1 tablet by mouth Daily., Disp: 30 tablet, Rfl: 3  •  atorvastatin (LIPITOR) 40 MG tablet, Take 1 tablet by mouth Every Night., Disp: 30 tablet, Rfl: 3  •  Cholecalciferol (VITAMIN D3) 125 MCG (5000 UT) capsule capsule, Take 5,000 Units by mouth Daily., Disp: , Rfl:   •  metoprolol tartrate (LOPRESSOR) 25 MG tablet, Take 0.5 tablets by mouth Every 12 (Twelve) Hours., Disp: 30 tablet, Rfl: 3  •  valACYclovir (VALTREX) 500 MG tablet, Take 1,000 mg by mouth 2 (Two) Times a Day As Needed (for cold sores)., Disp: , Rfl:   •  Zinc 100 MG tablet, Take  by mouth., Disp: , Rfl:     Family History:  Family History   Problem Relation Age of Onset   • Hypertension Mother    • Hypertension Father    • Atrial fibrillation Sister    • Hypertension Brother        Past Medical History:  Past Medical History:   Diagnosis Date   • Coronary artery disease    • Hyperlipidemia    • Hypertension        Past surgical History:  Past Surgical History:   Procedure Laterality Date   • CARDIAC CATHETERIZATION Left 5/3/2020    Procedure: LEFT HEART CATH with possible PCI;  Surgeon: Eliezer Hall MD;  Location: [facility] CATH INVASIVE LOCATION;  Service: Cardiovascular;  Laterality: Left;  Local and IV sedation   • CORONARY ARTERY BYPASS GRAFT N/A 5/4/2020    Procedure: CORONARY ARTERY BYPASS WITH INTERNAL MAMMARY ARTERY  GRAFT;  Surgeon: Yahir Ferris MD;  Location: [facility];  Service: Cardiothoracic;  Laterality: N/A;   • FINGER SURGERY     • TRANSESOPHAGEAL ECHOCARDIOGRAM (ALICE) N/A 5/4/2020    Procedure: TRANSESOPHAGEAL ECHOCARDIOGRAM WITH ANESTHESIA;  Surgeon: Yahir Ferris MD;  Location: [facility];  Service: Cardiothoracic;  Laterality: N/A;   • UVULECTOMY         Social History:  Social History     Socioeconomic History   • Marital status:      Spouse name: Not on file   • Number of children: Not on file   • Years of education: Not on file   • Highest education level: Not on file   Tobacco Use   • Smoking status: Never Smoker   • Smokeless tobacco: Never Used   Substance and Sexual Activity   • Alcohol use: Yes     Comment: occasional- 1 drink every other month   • Drug use: Never   • Sexual activity: Defer       Review of Systems:  The following systems were reviewed as they relate to the cardiovascular system: Constitutional, Eyes, ENT, Cardiovascular, Respiratory, Gastrointestinal, Integumentary, Neurological, Psychiatric, Hematologic, Endocrine, Musculoskeletal, and Genitourinary. The pertinent cardiovascular findings are reported above with all other cardiovascular points within those systems being negative.    Diagnostic Study Review:     Current Electrocardiogram:    ECG 12 Lead  Date/Time: 5/22/2020 12:44 PM  Performed by: Eliezer Hall MD  Authorized by: Eliezer Hall MD   Comparison: compared with previous ECG   Similar to previous ECG  Rhythm: sinus rhythm  Rate: normal  BPM: 66  Conduction: conduction normal  QRS axis: normal  Other findings: non-specific ST-T wave changes    Clinical impression: abnormal EKG              NOTE: The following portions of the patient's history were reviewed and updated this visit as appropriate: allergies, current medications, past family history, past medical history, past social history, past surgical history and problem list.

## 2020-05-26 ENCOUNTER — HOSPITAL ENCOUNTER (OUTPATIENT)
Dept: CARDIOLOGY | Facility: HOSPITAL | Age: 70
Discharge: HOME | End: 2020-05-26
Admitting: INTERNAL MEDICINE

## 2020-05-26 DIAGNOSIS — I25.10 CORONARY ARTERY DISEASE INVOLVING NATIVE CORONARY ARTERY OF NATIVE HEART WITHOUT ANGINA PECTORIS: ICD-10-CM

## 2020-05-26 PROCEDURE — 93016 CV STRESS TEST SUPVJ ONLY: CPT | Performed by: INTERNAL MEDICINE

## 2020-05-26 PROCEDURE — 93017 CV STRESS TEST TRACING ONLY: CPT

## 2020-05-27 ENCOUNTER — READMISSION MANAGEMENT (OUTPATIENT)
Dept: CALL CENTER | Facility: HOSPITAL | Age: 70
End: 2020-05-27

## 2020-05-27 NOTE — OUTREACH NOTE
CT Surgery Week 3 Survey      Responses   [facility] patient discharged from?  [facility]   Does the patient have one of the following disease processes/diagnoses(primary or secondary)?  Cardiothoracic surgery   Week 3 attempt successful?  Yes   Call start time  1601   Call end time  1606   Discharge diagnosis  CABG this visit   Person spoke with today (if not patient) and relationship  wife, Sadaf   Meds reviewed with patient/caregiver?  Yes   Is the patient having any side effects they believe may be caused by any medication additions or changes?  No   Does the patient have all medications related to this admission filled (includes all antibiotics, pain medications, cardiac medications, etc.)  Yes   Is the patient taking all medications as directed (includes completed medication regime)?  Yes   Does the patient have a primary care provider?   Yes   Does the patient have an appointment scheduled with their C/T surgeon?  Yes   Has the patient kept scheduled appointments due by today?  Yes   What is the Home health agency?   [facility]    Has home health visited the patient within 72 hours of discharge?  Yes   Psychosocial issues?  No   Did the patient receive a copy of their discharge instructions?  Yes   Nursing interventions  Reviewed instructions with patient   What is the patient's perception of their health status since discharge?  Improving   Nursing interventions  Nurse provided patient education   Is the patient/caregiver able to teach back normal signs of recovery?  Pain or discomfort at incisional site   Nursing interventions  Reassured on normal signs of recovery   Is the patient /caregiver able to teach back basic post-op care?  Keep incision areas clean, dry and protected   Is the patient/caregiver able to teach back signs and symptoms of incisional infection?  Fever   Is the patient/caregiver able to teach back the hierarchy of who to call/visit for symptoms/problems? PCP, Specialist, Home health  nurse, Urgent Care, ED, 911  Yes   Additional teach back comments  wife had question about what surgical procedure was used on patient's chest, referred wife to call surgeon's office for specific details   Week 3 call completed?  Yes          Lucía Ennis RN

## 2020-05-28 LAB
BH CV STRESS BP STAGE 1: NORMAL
BH CV STRESS BP STAGE 2: NORMAL
BH CV STRESS DURATION MIN STAGE 1: 3
BH CV STRESS DURATION MIN STAGE 2: 3
BH CV STRESS DURATION SEC STAGE 1: 0
BH CV STRESS DURATION SEC STAGE 2: 0
BH CV STRESS GRADE STAGE 1: 10
BH CV STRESS GRADE STAGE 2: 12
BH CV STRESS HR STAGE 1: 102
BH CV STRESS HR STAGE 2: 140
BH CV STRESS METS STAGE 1: 5
BH CV STRESS METS STAGE 2: 7
BH CV STRESS PROTOCOL 1: NORMAL
BH CV STRESS RECOVERY BP: NORMAL MMHG
BH CV STRESS RECOVERY HR: 70 BPM
BH CV STRESS SPEED STAGE 1: 1.7
BH CV STRESS SPEED STAGE 2: 2.5
BH CV STRESS STAGE 1: 1
BH CV STRESS STAGE 2: 2
MAXIMAL PREDICTED HEART RATE: 152 BPM
PERCENT MAX PREDICTED HR: 92.11 %
STRESS BASELINE BP: NORMAL MMHG
STRESS BASELINE HR: 60 BPM
STRESS PERCENT HR: 108 %
STRESS POST ESTIMATED WORKLOAD: 7 METS
STRESS POST EXERCISE DUR MIN: 6 MIN
STRESS POST EXERCISE DUR SEC: 0 SEC
STRESS POST PEAK BP: NORMAL MMHG
STRESS POST PEAK HR: 140 BPM
STRESS TARGET HR: 129 BPM

## 2020-05-28 PROCEDURE — 93018 CV STRESS TEST I&R ONLY: CPT | Performed by: INTERNAL MEDICINE

## 2020-06-03 ENCOUNTER — READMISSION MANAGEMENT (OUTPATIENT)
Dept: CALL CENTER | Facility: HOSPITAL | Age: 70
End: 2020-06-03

## 2020-06-03 NOTE — OUTREACH NOTE
CT Surgery Week 4 Survey      Responses   [facility] patient discharged from?  [facility]   Does the patient have one of the following disease processes/diagnoses(primary or secondary)?  Cardiothoracic surgery   Week 4 attempt successful?  No          Joycelyn Donovan RN

## 2020-06-15 ENCOUNTER — OFFICE VISIT (OUTPATIENT)
Dept: CARDIAC SURGERY | Facility: CLINIC | Age: 70
End: 2020-06-15

## 2020-06-15 VITALS
OXYGEN SATURATION: 97 % | RESPIRATION RATE: 20 BRPM | TEMPERATURE: 97.9 F | HEART RATE: 53 BPM | SYSTOLIC BLOOD PRESSURE: 139 MMHG | DIASTOLIC BLOOD PRESSURE: 86 MMHG | HEIGHT: 74 IN | WEIGHT: 205 LBS | BODY MASS INDEX: 26.31 KG/M2

## 2020-06-15 DIAGNOSIS — Z09 FOLLOW UP: ICD-10-CM

## 2020-06-15 DIAGNOSIS — Z95.1 S/P CABG X 4: Primary | ICD-10-CM

## 2020-06-15 PROCEDURE — 99024 POSTOP FOLLOW-UP VISIT: CPT | Performed by: NURSE PRACTITIONER

## 2020-06-15 RX ORDER — ROSUVASTATIN CALCIUM 10 MG/1
TABLET, COATED ORAL
COMMUNITY
End: 2020-06-15 | Stop reason: SDUPTHER

## 2020-06-15 RX ORDER — VALACYCLOVIR HYDROCHLORIDE 1 G/1
TABLET, FILM COATED ORAL
COMMUNITY

## 2020-06-15 RX ORDER — METOPROLOL SUCCINATE 25 MG/1
TABLET, EXTENDED RELEASE ORAL
COMMUNITY
End: 2020-08-12

## 2020-06-16 NOTE — PROGRESS NOTES
"CARDIOVASCULAR SURGERY FOLLOW-UP PROGRESS NOTE  Chief Complaint: Post-op Follow Up        HPI:   Dear Marshall Harman MD and colleagues:    It was nice to see Keshawn Lr in follow up today after cardiac surgery.  As you know, he is a 70 y.o. male with CAD who underwent CABG at [facility] by Dr. Ferris on 5/4/2020. He did well postoperatively and continues to do well. He comes in today complaining of nothing. His sternal and bilateral incisions are all well healed with no redness or drainage. His sternum is stable with no popping or clicking.  His activity level has been good. He states he is very active and walks approximately 2 miles every other day. He is not interested in cardiac rehab as he is very active and recovering well on his own. Patient is overall recovering very well. All questions answered. He was instructed to call our office should he have more questions or concerns.    Physical Exam:         /86 (BP Location: Right arm, Patient Position: Sitting, Cuff Size: Adult)   Pulse 53   Temp 97.9 °F (36.6 °C) (Oral)   Resp 20   Ht 188 cm (74\")   Wt 93 kg (205 lb)   SpO2 97%   BMI 26.32 kg/m²   Heart:  regular rate and rhythm, S1, S2 normal, no murmur, click, rub or gallop  Lungs:  clear to auscultation bilaterally  Extremities:  no edema  Incision(s):  mid chest healing well, no significant drainage, no dehiscence, no significant erythema, left, right leg healing well, no significant drainage, no dehiscence, no significant erythema    Assessment/Plan:     S/P CABG. Overall, he is doing well.    No significant post-op complications    Keep incisions clean and dry  OK to drive if not taking narcotic pain medicine  Follow-up as scheduled with cardiology    No restrictions of activity.      Thank you for allowing me to participate in the care of your patient.    Regards,  EMMA RAND      "

## 2020-06-30 DIAGNOSIS — I21.4 NSTEMI (NON-ST ELEVATED MYOCARDIAL INFARCTION) (HCC): ICD-10-CM

## 2020-06-30 DIAGNOSIS — Z95.1 STATUS POST CORONARY ARTERY BYPASS GRAFT: Primary | ICD-10-CM

## 2020-07-01 ENCOUNTER — TELEPHONE (OUTPATIENT)
Dept: CARDIAC REHAB | Facility: HOSPITAL | Age: 70
End: 2020-07-01

## 2020-07-01 NOTE — TELEPHONE ENCOUNTER
07/01/2020- pt had entry TMT 05/26/20 for CR - at that time he expressed an interest in participating in CR. However the CR has been closed secondary to the Covid pandemic but will reopen on July 6 2020.Pt now states no longer interested in coming to CR since he is exercising at home. CR staff spent 20 min + on the phone discussing HHD, low sodium diet exercise etc with pt. TH

## 2020-08-12 ENCOUNTER — OFFICE VISIT (OUTPATIENT)
Dept: CARDIOLOGY | Facility: CLINIC | Age: 70
End: 2020-08-12

## 2020-08-12 VITALS
WEIGHT: 212 LBS | BODY MASS INDEX: 27.21 KG/M2 | HEART RATE: 52 BPM | SYSTOLIC BLOOD PRESSURE: 212 MMHG | OXYGEN SATURATION: 98 % | DIASTOLIC BLOOD PRESSURE: 101 MMHG | HEIGHT: 74 IN | RESPIRATION RATE: 18 BRPM

## 2020-08-12 DIAGNOSIS — Z95.1 S/P CABG X 4: ICD-10-CM

## 2020-08-12 DIAGNOSIS — E78.5 DYSLIPIDEMIA: Chronic | ICD-10-CM

## 2020-08-12 DIAGNOSIS — I25.10 CORONARY ARTERY DISEASE INVOLVING NATIVE CORONARY ARTERY OF NATIVE HEART WITHOUT ANGINA PECTORIS: Primary | ICD-10-CM

## 2020-08-12 DIAGNOSIS — I25.110 CORONARY ARTERY DISEASE INVOLVING NATIVE CORONARY ARTERY OF NATIVE HEART WITH UNSTABLE ANGINA PECTORIS (HCC): ICD-10-CM

## 2020-08-12 DIAGNOSIS — I25.118 CORONARY ARTERY DISEASE OF NATIVE ARTERY OF NATIVE HEART WITH STABLE ANGINA PECTORIS (HCC): ICD-10-CM

## 2020-08-12 DIAGNOSIS — I21.4 SUBENDOCARDIAL MI FIRST EPISODE CARE (HCC): ICD-10-CM

## 2020-08-12 DIAGNOSIS — I10 ESSENTIAL HYPERTENSION: Chronic | ICD-10-CM

## 2020-08-12 PROCEDURE — 99214 OFFICE O/P EST MOD 30 MIN: CPT | Performed by: INTERNAL MEDICINE

## 2020-08-12 RX ORDER — METOPROLOL SUCCINATE 25 MG/1
25 TABLET, EXTENDED RELEASE ORAL DAILY
Qty: 90 TABLET | Refills: 3 | Status: SHIPPED | OUTPATIENT
Start: 2020-08-12 | End: 2021-09-03

## 2020-08-12 RX ORDER — ATORVASTATIN CALCIUM 40 MG/1
40 TABLET, FILM COATED ORAL NIGHTLY
Qty: 90 TABLET | Refills: 3 | Status: SHIPPED | OUTPATIENT
Start: 2020-08-12 | End: 2021-12-14

## 2020-08-12 RX ORDER — LISINOPRIL 10 MG/1
10 TABLET ORAL DAILY
Qty: 90 TABLET | Refills: 3 | Status: SHIPPED | OUTPATIENT
Start: 2020-08-12 | End: 2021-12-14

## 2020-08-12 NOTE — PROGRESS NOTES
"Cardiology Office Visit      Encounter Date:  08/12/2020    Patient ID:   Keshawn Lr is a 70 y.o. male.      Reason For Followup:  Coronary artery disease  Non-ST elevation myocardial infarction    Brief Clinical History:  Dear  Provider, No Known    I had the pleasure of seeing Keshawn Lr today. As you are well aware, this is a 70 y.o. male recent hospitalization at [facility] for non-ST elevation myocardial infarction diagnosed with severe three-vessel coronary artery disease resulted with coronary artery bypass surgery    Interval History:  Patient is clinically doing better  Denies any symptoms of chest pain shortness of breath dizziness or syncope    Assessment & Plan    Impressions:  Coronary artery disease  Non-ST elevation myocardial infarction  Hypertension  Hyperlipidemia  Multivessel coronary artery disease status post coronary artery bypass surgery     Multivessel coronary artery disease  Significant obstructive coronary artery disease involving the right coronary artery left circumflex artery ramus branch and also diagonal system  Normal LV systolic function  Normal wall motion  Normal left-sided filling pressures  Estimated LV ejection fraction of 55 to 60%    Recommendations:  Continue aspirin statin and beta-blocker  Start patient on lisinopril 10 mg p.o. once a day  Blood pressure monitoring  Office in 2 weeks with blood pressure readings  Recheck lipids in 3 months  Continue close monitoring  Discussed with the patient at length  Wound site looks good with no hematoma or bleeding  Follow-up in office in 3 months  Objective:    Vitals:  Vitals:    08/12/20 1124 08/12/20 1126   BP: (!) 191/104 (!) 212/101   BP Location: Left arm Right arm   Pulse: 52    Resp: 18    SpO2: 98%    Weight: 96.2 kg (212 lb)    Height: 188 cm (74\")        Physical Exam:    General: Alert, cooperative, no distress, appears stated age  Head:  Normocephalic, atraumatic, mucous membranes " moist  Eyes:  Conjunctiva/corneas clear, EOM's intact     Neck:  Supple,  no adenopathy;      Lungs: Clear to auscultation bilaterally, no wheezes rhonchi rales are noted  Chest wall: No tenderness  Heart::  Regular rate and rhythm, S1 and S2 normal, no murmur, rub or gallop  Abdomen: Soft, non-tender, nondistended bowel sounds active  Extremities: No cyanosis, clubbing, or edema  Pulses: 2+ and symmetric all extremities  Skin:  No rashes or lesions  Neuro/psych: A&O x3. CN II through XII are grossly intact with appropriate affect      Allergies:  Allergies   Allergen Reactions   • Neosporin [Bacitracin-Polymyxin B] Other (See Comments)     Burning sensation        Medication Review:     Current Outpatient Medications:   •  aspirin 81 MG EC tablet, Take 1 tablet by mouth Daily., Disp: 30 tablet, Rfl: 3  •  atorvastatin (LIPITOR) 40 MG tablet, Take 1 tablet by mouth Every Night., Disp: 90 tablet, Rfl: 3  •  Cholecalciferol (VITAMIN D3) 125 MCG (5000 UT) capsule capsule, Take 5,000 Units by mouth Daily., Disp: , Rfl:   •  metoprolol succinate XL (TOPROL-XL) 25 MG 24 hr tablet, 1/2 tablet po daily, Disp: , Rfl:   •  valACYclovir (VALTREX) 1000 MG tablet, valacyclovir 1 gram tablet, Disp: , Rfl:   •  Zinc 100 MG tablet, Take  by mouth., Disp: , Rfl:     Family History:  Family History   Problem Relation Age of Onset   • Hypertension Mother    • Hypertension Father    • Atrial fibrillation Sister    • Hypertension Brother        Past Medical History:  Past Medical History:   Diagnosis Date   • Coronary artery disease    • Hyperlipidemia    • Hypertension        Past surgical History:  Past Surgical History:   Procedure Laterality Date   • CARDIAC CATHETERIZATION Left 5/3/2020    Procedure: LEFT HEART CATH with possible PCI;  Surgeon: Eliezer Hall MD;  [facility];  Service: Cardiovascular;  Laterality: Left;  Local and IV sedation   • CORONARY ARTERY BYPASS GRAFT N/A 5/4/2020     Procedure: CORONARY ARTERY BYPASS WITH INTERNAL MAMMARY ARTERY GRAFT;  Surgeon: Yahir Ferris MD;  Location: [facility];  Service: Cardiothoracic;  Laterality: N/A;   • FINGER SURGERY     • TRANSESOPHAGEAL ECHOCARDIOGRAM (ALICE) N/A 5/4/2020    Procedure: TRANSESOPHAGEAL ECHOCARDIOGRAM WITH ANESTHESIA;  Surgeon: Yahir Ferris MD;  Location: [facility];  Service: Cardiothoracic;  Laterality: N/A;   • UVULECTOMY         Social History:  Social History     Socioeconomic History   • Marital status:      Spouse name: Not on file   • Number of children: Not on file   • Years of education: Not on file   • Highest education level: Not on file   Tobacco Use   • Smoking status: Never Smoker   • Smokeless tobacco: Never Used   Substance and Sexual Activity   • Alcohol use: Yes     Comment: occasional- 1 drink every other month   • Drug use: Never   • Sexual activity: Defer       Review of Systems:  The following systems were reviewed as they relate to the cardiovascular system: Constitutional, Eyes, ENT, Cardiovascular, Respiratory, Gastrointestinal, Integumentary, Neurological, Psychiatric, Hematologic, Endocrine, Musculoskeletal, and Genitourinary. The pertinent cardiovascular findings are reported above with all other cardiovascular points within those systems being negative.    Diagnostic Study Review:     Current Electrocardiogram:  Procedures      NOTE: The following portions of the patient's history were reviewed and updated this visit as appropriate: allergies, current medications, past family history, past medical history, past social history, past surgical history and problem list.

## 2020-11-17 ENCOUNTER — OFFICE VISIT (OUTPATIENT)
Dept: CARDIOLOGY | Facility: CLINIC | Age: 70
End: 2020-11-17

## 2020-11-17 VITALS
RESPIRATION RATE: 18 BRPM | BODY MASS INDEX: 27.72 KG/M2 | WEIGHT: 216 LBS | HEIGHT: 74 IN | SYSTOLIC BLOOD PRESSURE: 164 MMHG | OXYGEN SATURATION: 99 % | HEART RATE: 57 BPM | DIASTOLIC BLOOD PRESSURE: 93 MMHG

## 2020-11-17 DIAGNOSIS — I25.118 CORONARY ARTERY DISEASE OF NATIVE ARTERY OF NATIVE HEART WITH STABLE ANGINA PECTORIS (HCC): ICD-10-CM

## 2020-11-17 DIAGNOSIS — I21.4 SUBENDOCARDIAL MI FIRST EPISODE CARE (HCC): ICD-10-CM

## 2020-11-17 DIAGNOSIS — I25.110 CORONARY ARTERY DISEASE INVOLVING NATIVE CORONARY ARTERY OF NATIVE HEART WITH UNSTABLE ANGINA PECTORIS (HCC): ICD-10-CM

## 2020-11-17 DIAGNOSIS — I10 ESSENTIAL HYPERTENSION: Primary | Chronic | ICD-10-CM

## 2020-11-17 PROCEDURE — 99214 OFFICE O/P EST MOD 30 MIN: CPT | Performed by: INTERNAL MEDICINE

## 2020-11-17 PROCEDURE — 93000 ELECTROCARDIOGRAM COMPLETE: CPT | Performed by: INTERNAL MEDICINE

## 2020-11-17 NOTE — PROGRESS NOTES
Cardiology Office Visit      Encounter Date:  11/17/2020    Patient ID:   Keshawn Lr is a 70 y.o. male.    Reason For Followup:  Coronary artery disease  Non-ST elevation myocardial infarction    Brief Clinical History:  Dear  Provider, No Known    I had the pleasure of seeing Keshawn Lr today. As you are well aware, this is a 70 y.o. male recent hospitalization at [facility] for non-ST elevation myocardial infarction diagnosed with severe three-vessel coronary artery disease resulted with coronary artery bypass surgery    Interval History:  Patient is clinically doing better  Denies any symptoms of chest pain shortness of breath dizziness or syncope  Home blood pressure readings are very good  Patient had some nonspecific symptoms fatigue and weakness that lasted briefly and resolved most likely possible viral syndrome      Assessment & Plan    Impressions:  Coronary artery disease  Non-ST elevation myocardial infarction  Hypertension  Hyperlipidemia  Multivessel coronary artery disease status post coronary artery bypass surgery  Possible whitecoat syndrome    Multivessel coronary artery disease  Significant obstructive coronary artery disease involving the right coronary artery left circumflex artery ramus branch and also diagonal system  Normal LV systolic function  Normal wall motion  Normal left-sided filling pressures  Estimated LV ejection fraction of 55 to 60%    Recommendations:  Continue aspirin statin and beta-blocker  Continue  lisinopril 10 mg p.o. once a day  Continue current dose of beta-blocker  Home blood pressure monitoring  Patient is advised to bring the blood pressure cuff and verify the possible whitecoat syndrome  Blood pressure monitoring  Advise labs including CBC CMP and lipids    Continue close monitoring  Follow-up in office in 6 months    Objective:    Vitals:  Vitals:    11/17/20 1419   BP: 164/93   BP Location: Left arm   Pulse: 57   Resp: 18   SpO2: 99%   Weight: 98 kg  "(216 lb)   Height: 188 cm (74\")       Physical Exam:    General: Alert, cooperative, no distress, appears stated age  Head:  Normocephalic, atraumatic, mucous membranes moist  Eyes:  Conjunctiva/corneas clear, EOM's intact     Neck:  Supple,  no adenopathy;      Lungs: Clear to auscultation bilaterally, no wheezes rhonchi rales are noted  Chest wall: No tenderness  Heart::  Regular rate and rhythm, S1 and S2 normal, no murmur, rub or gallop  Abdomen: Soft, non-tender, nondistended bowel sounds active  Extremities: No cyanosis, clubbing, or edema  Pulses: 2+ and symmetric all extremities  Skin:  No rashes or lesions  Neuro/psych: A&O x3. CN II through XII are grossly intact with appropriate affect      Allergies:  Allergies   Allergen Reactions   • Neosporin [Bacitracin-Polymyxin B] Other (See Comments)     Burning sensation        Medication Review:     Current Outpatient Medications:   •  aspirin 81 MG EC tablet, Take 1 tablet by mouth Daily., Disp: 30 tablet, Rfl: 3  •  atorvastatin (LIPITOR) 40 MG tablet, Take 1 tablet by mouth Every Night., Disp: 90 tablet, Rfl: 3  •  Cholecalciferol (VITAMIN D3) 125 MCG (5000 UT) capsule capsule, Take 5,000 Units by mouth Daily., Disp: , Rfl:   •  lisinopril (PRINIVIL,ZESTRIL) 10 MG tablet, Take 1 tablet by mouth Daily., Disp: 90 tablet, Rfl: 3  •  metoprolol succinate XL (TOPROL-XL) 25 MG 24 hr tablet, Take 1 tablet by mouth Daily., Disp: 90 tablet, Rfl: 3  •  valACYclovir (VALTREX) 1000 MG tablet, valacyclovir 1 gram tablet, Disp: , Rfl:   •  Zinc 100 MG tablet, Take  by mouth., Disp: , Rfl:     Family History:  Family History   Problem Relation Age of Onset   • Hypertension Mother    • Hypertension Father    • Atrial fibrillation Sister    • Hypertension Brother        Past Medical History:  Past Medical History:   Diagnosis Date   • Coronary artery disease    • Hyperlipidemia    • Hypertension        Past surgical History:  Past Surgical History:   Procedure Laterality Date "   • CARDIAC CATHETERIZATION Left 5/3/2020    Procedure: LEFT HEART CATH with possible PCI;  Surgeon: Eliezer Hall MD;  Location: [facility] CATH INVASIVE LOCATION;  Service: Cardiovascular;  Laterality: Left;  Local and IV sedation   • CORONARY ARTERY BYPASS GRAFT N/A 5/4/2020    Procedure: CORONARY ARTERY BYPASS WITH INTERNAL MAMMARY ARTERY GRAFT;  Surgeon: Yahir Ferris MD;  Location: [facility];  Service: Cardiothoracic;  Laterality: N/A;   • FINGER SURGERY     • TRANSESOPHAGEAL ECHOCARDIOGRAM (ALICE) N/A 5/4/2020    Procedure: TRANSESOPHAGEAL ECHOCARDIOGRAM WITH ANESTHESIA;  Surgeon: Yahir Ferris MD;  Location: [facility];  Service: Cardiothoracic;  Laterality: N/A;   • UVULECTOMY         Social History:  Social History     Socioeconomic History   • Marital status:      Spouse name: Not on file   • Number of children: Not on file   • Years of education: Not on file   • Highest education level: Not on file   Tobacco Use   • Smoking status: Never Smoker   • Smokeless tobacco: Never Used   Substance and Sexual Activity   • Alcohol use: Yes     Comment: occasional- 1 drink every other month   • Drug use: Never   • Sexual activity: Defer       Review of Systems:  The following systems were reviewed as they relate to the cardiovascular system: Constitutional, Eyes, ENT, Cardiovascular, Respiratory, Gastrointestinal, Integumentary, Neurological, Psychiatric, Hematologic, Endocrine, Musculoskeletal, and Genitourinary. The pertinent cardiovascular findings are reported above with all other cardiovascular points within those systems being negative.    Diagnostic Study Review:     Current Electrocardiogram:    ECG 12 Lead    Date/Time: 11/17/2020 2:35 PM  Performed by: Eliezer Hall MD  Authorized by: Eliezer Hall MD   Comparison: compared with previous ECG   Similar to previous ECG  Rhythm: sinus rhythm and sinus bradycardia  Rate: normal  BPM: 57  Conduction: conduction  normal  QRS axis: normal  Other findings: non-specific ST-T wave changes    Clinical impression: abnormal EKG              NOTE: The following portions of the patient's history were reviewed and updated this visit as appropriate: allergies, current medications, past family history, past medical history, past social history, past surgical history and problem list.

## 2021-02-20 ENCOUNTER — HOSPITAL ENCOUNTER (EMERGENCY)
Facility: HOSPITAL | Age: 71
Discharge: HOME | End: 2021-02-20
Attending: EMERGENCY MEDICINE | Admitting: EMERGENCY MEDICINE

## 2021-02-20 VITALS
OXYGEN SATURATION: 97 % | DIASTOLIC BLOOD PRESSURE: 90 MMHG | BODY MASS INDEX: 28.92 KG/M2 | SYSTOLIC BLOOD PRESSURE: 155 MMHG | TEMPERATURE: 97.9 F | WEIGHT: 225.31 LBS | HEIGHT: 74 IN | HEART RATE: 84 BPM | RESPIRATION RATE: 16 BRPM

## 2021-02-20 DIAGNOSIS — S61.221A LACERATION OF LEFT INDEX FINGER WITH FOREIGN BODY WITHOUT DAMAGE TO NAIL, INITIAL ENCOUNTER: Primary | ICD-10-CM

## 2021-02-20 PROCEDURE — 96372 THER/PROPH/DIAG INJ SC/IM: CPT

## 2021-02-20 PROCEDURE — 25010000003 CEFAZOLIN PER 500 MG: Performed by: EMERGENCY MEDICINE

## 2021-02-20 PROCEDURE — 25010000002 TDAP 5-2.5-18.5 LF-MCG/0.5 SUSPENSION: Performed by: EMERGENCY MEDICINE

## 2021-02-20 PROCEDURE — 90715 TDAP VACCINE 7 YRS/> IM: CPT | Performed by: EMERGENCY MEDICINE

## 2021-02-20 PROCEDURE — 99282 EMERGENCY DEPT VISIT SF MDM: CPT

## 2021-02-20 PROCEDURE — 90471 IMMUNIZATION ADMIN: CPT | Performed by: EMERGENCY MEDICINE

## 2021-02-20 RX ORDER — CEFAZOLIN SODIUM 1 G/3ML
1 INJECTION, POWDER, FOR SOLUTION INTRAMUSCULAR; INTRAVENOUS ONCE
Status: COMPLETED | OUTPATIENT
Start: 2021-02-20 | End: 2021-02-20

## 2021-02-20 RX ADMIN — TETANUS TOXOID, REDUCED DIPHTHERIA TOXOID AND ACELLULAR PERTUSSIS VACCINE, ADSORBED 0.5 ML: 5; 2.5; 8; 8; 2.5 SUSPENSION INTRAMUSCULAR at 12:42

## 2021-02-20 RX ADMIN — CEFAZOLIN 1 G: 1 INJECTION, POWDER, FOR SOLUTION INTRAMUSCULAR; INTRAVENOUS at 13:46

## 2021-02-20 NOTE — ED NOTES
Pt finger laceration splinted, applied bacitracin, and bacitracin gauze applied.        Ctaherine Cole, RN  02/20/21 3254

## 2021-02-20 NOTE — ED PROVIDER NOTES
Subjective   History of Present Illness  70-year-old male presents with laceration left index finger.  He cut his finger using a saw this morning cutting wood pallet.  He has no complaints of numbness or other injury.  Review of Systems  Right-hand-dominant  Past Medical History:   Diagnosis Date   • Coronary artery disease    • Hyperlipidemia    • Hypertension        Allergies   Allergen Reactions   • Neosporin [Bacitracin-Polymyxin B] Other (See Comments)     Burning sensation        Past Surgical History:   Procedure Laterality Date   • CARDIAC CATHETERIZATION Left 5/3/2020    Procedure: LEFT HEART CATH with possible PCI;  Surgeon: Eliezer Hall MD;  Location: [facility] CATH INVASIVE LOCATION;  Service: Cardiovascular;  Laterality: Left;  Local and IV sedation   • CORONARY ARTERY BYPASS GRAFT N/A 5/4/2020    Procedure: CORONARY ARTERY BYPASS WITH INTERNAL MAMMARY ARTERY GRAFT;  Surgeon: Yahir Ferris MD;  Location: [facility];  Service: Cardiothoracic;  Laterality: N/A;   • FINGER SURGERY     • TRANSESOPHAGEAL ECHOCARDIOGRAM (ALICE) N/A 5/4/2020    Procedure: TRANSESOPHAGEAL ECHOCARDIOGRAM WITH ANESTHESIA;  Surgeon: Yahir Ferris MD;  Location: [facility];  Service: Cardiothoracic;  Laterality: N/A;   • UVULECTOMY         Family History   Problem Relation Age of Onset   • Hypertension Mother    • Hypertension Father    • Atrial fibrillation Sister    • Hypertension Brother        Social History     Socioeconomic History   • Marital status:      Spouse name: Not on file   • Number of children: Not on file   • Years of education: Not on file   • Highest education level: Not on file   Tobacco Use   • Smoking status: Never Smoker   • Smokeless tobacco: Never Used   Substance and Sexual Activity   • Alcohol use: Yes     Comment: occasional- 1 drink every other month   • Drug use: Never   • Sexual activity: Defer     Prior to Admission medications    Medication Sig Start Date End Date Taking?  Authorizing Provider   aspirin 81 MG EC tablet Take 1 tablet by mouth Daily. 5/9/20   Anita Allen APRN   atorvastatin (LIPITOR) 40 MG tablet Take 1 tablet by mouth Every Night. 8/12/20   Eliezer Hall MD   Cholecalciferol (VITAMIN D3) 125 MCG (5000 UT) capsule capsule Take 5,000 Units by mouth Daily.    ProviderLeilani MD   lisinopril (PRINIVIL,ZESTRIL) 10 MG tablet Take 1 tablet by mouth Daily. 8/12/20   Eliezer Hall MD   metoprolol succinate XL (TOPROL-XL) 25 MG 24 hr tablet Take 1 tablet by mouth Daily. 8/12/20   Eliezer Hall MD   valACYclovir (VALTREX) 1000 MG tablet valacyclovir 1 gram tablet    Provider, MD Leilani   Zinc 100 MG tablet Take  by mouth.    Provider, MD Leilani           Objective   Physical Exam  70-year-old male awake alert.  Examination of left hand reveals 3 cm laceration over the dorsal radial aspect of index finger proximally.  He does have intact two-point discrimination.  He has intact flexion and extension.  He has intact capillary refill.  Laceration Repair    Date/Time: 2/20/2021 1:13 PM  Performed by: Justin Toth MD  Authorized by: Justin Toth MD     Consent:     Consent obtained:  Verbal  Anesthesia (see MAR for exact dosages):     Anesthesia method:  Nerve block    Block anesthetic:  Lidocaine 2% w/o epi    Block injection procedure:  Introduced needle and negative aspiration for blood    Block outcome:  Anesthesia achieved  Laceration details:     Location:  Finger    Finger location:  L index finger    Length (cm):  3  Repair type:     Repair type:  Simple  Pre-procedure details:     Preparation:  Patient was prepped and draped in usual sterile fashion  Exploration:     Hemostasis achieved with:  Tourniquet    Wound exploration: wound explored through full range of motion      Wound extent: foreign bodies/material and tendon damage      Tendon damage extent:  Partial transection    Tendon repair plan:  Refer for  "evaluation    Contaminated: yes    Treatment:     Area cleansed with:  Shur-Clens and saline    Amount of cleaning:  Standard    Irrigation solution:  Sterile saline    Irrigation method:  Pressure wash    Visualized foreign bodies/material removed: yes    Skin repair:     Repair method:  Sutures    Suture size:  5-0    Suture material:  Nylon    Suture technique:  Simple interrupted  Approximation:     Approximation:  Close  Post-procedure details:     Dressing:  Antibiotic ointment and sterile dressing  Comments:      Patient's wound was explored using magnification and finger tourniquet with Penrose drain.  He was noted to have injury extending to superficial fibers of extensor tendon.  Bulk of the tendon appeared to be intact.  There is few foreign bodies noted in the wound which were removed.  No further foreign bodies were noted.  The wound did not extend to flexor tendon.  Wound was closed with interrupted 5-0 Ethilon suture.               ED Course      Patient's tetanus immunization was updated.       No radiology results for the last day  Medications   ceFAZolin (ANCEF) injection 1 g (has no administration in time range)   Tdap (BOOSTRIX) injection 0.5 mL (0.5 mL Intramuscular Given 2/20/21 1242)     /91   Pulse 66   Temp 97.9 °F (36.6 °C) (Oral)   Resp 16   Ht 188 cm (74\")   Wt 102 kg (225 lb 5 oz)   SpO2 97%   BMI 28.93 kg/m²                                   MDM  Patient's findings were discussed with him.  His tetanus imitation was updated and he was given dose of Kefzol.  After repair wound was dressed with bacitracin sterile dressing and placed in finger splint.  He was discussed with Dr. Avalos.  He is to follow-up with him for recheck in the office this week.  Return new or worsening symptoms.  Final diagnoses:   Laceration of left index finger with foreign body without damage to nail, initial encounter            Justin Toth MD  02/20/21 1344    "

## 2021-02-20 NOTE — ED NOTES
Cleaned pts laceration with sterile water and placed cleaned bandages x3       Ayaka Alonzo  02/20/21 0674

## 2021-02-20 NOTE — ED NOTES
Pt c/o laceration to left index finger r/t using saw today.     Theresa Manrique, RN  02/20/21 5462

## 2021-02-20 NOTE — ED NOTES
Pt reports he was cutting a pallet with a saw and slipped and cut his left index finger   Pt denies sensory changes     Catherine Cole RN  02/20/21 3282

## 2021-02-20 NOTE — DISCHARGE INSTRUCTIONS
Keep wound clean and dry, may change dressing if needed, follow-up with Dr. Avalos for repeat evaluation

## 2021-05-18 ENCOUNTER — OFFICE VISIT (OUTPATIENT)
Dept: CARDIOLOGY | Facility: CLINIC | Age: 71
End: 2021-05-18

## 2021-05-18 VITALS
WEIGHT: 221 LBS | DIASTOLIC BLOOD PRESSURE: 98 MMHG | HEIGHT: 74 IN | BODY MASS INDEX: 28.36 KG/M2 | OXYGEN SATURATION: 95 % | HEART RATE: 54 BPM | RESPIRATION RATE: 18 BRPM | SYSTOLIC BLOOD PRESSURE: 191 MMHG

## 2021-05-18 DIAGNOSIS — E78.5 DYSLIPIDEMIA: Chronic | ICD-10-CM

## 2021-05-18 DIAGNOSIS — I10 ESSENTIAL HYPERTENSION: Primary | ICD-10-CM

## 2021-05-18 DIAGNOSIS — I25.110 CORONARY ARTERY DISEASE INVOLVING NATIVE CORONARY ARTERY OF NATIVE HEART WITH UNSTABLE ANGINA PECTORIS (HCC): ICD-10-CM

## 2021-05-18 DIAGNOSIS — I25.118 CORONARY ARTERY DISEASE OF NATIVE ARTERY OF NATIVE HEART WITH STABLE ANGINA PECTORIS (HCC): ICD-10-CM

## 2021-05-18 DIAGNOSIS — I21.4 SUBENDOCARDIAL MI FIRST EPISODE CARE (HCC): ICD-10-CM

## 2021-05-18 DIAGNOSIS — M94.9 DISORDER OF CARTILAGE, UNSPECIFIED: ICD-10-CM

## 2021-05-18 PROCEDURE — 99214 OFFICE O/P EST MOD 30 MIN: CPT | Performed by: INTERNAL MEDICINE

## 2021-05-18 PROCEDURE — 93000 ELECTROCARDIOGRAM COMPLETE: CPT | Performed by: INTERNAL MEDICINE

## 2021-05-18 RX ORDER — MULTIVIT WITH MINERALS/LUTEIN
250 TABLET ORAL DAILY
COMMUNITY

## 2021-05-18 NOTE — PROGRESS NOTES
Cardiology Office Visit      Encounter Date:  05/18/2021    Patient ID:   Keshawn Lr is a 71 y.o. male.      Reason For Followup:  Coronary artery disease  Non-ST elevation myocardial infarction    Brief Clinical History:  Dear  Provider, No Known    I had the pleasure of seeing Keshawn Lr today. As you are well aware, this is a 71 y.o. male recent hospitalization at [facility] for non-ST elevation myocardial infarction diagnosed with severe three-vessel coronary artery disease resulted with coronary artery bypass surgery    Interval History:  Patient is clinically doing better  Denies any symptoms of chest pain shortness of breath dizziness or syncope  Home blood pressure readings are very good  Patient had some nonspecific symptoms fatigue and weakness that lasted briefly and resolved most likely possible viral syndrome      Assessment & Plan    Impressions:  Coronary artery disease  Non-ST elevation myocardial infarction  Hypertension  Hyperlipidemia  Multivessel coronary artery disease status post coronary artery bypass surgery  Possible whitecoat syndrome    Multivessel coronary artery disease  Significant obstructive coronary artery disease involving the right coronary artery left circumflex artery ramus branch and also diagonal system  Normal LV systolic function  Normal wall motion  Normal left-sided filling pressures  Estimated LV ejection fraction of 55 to 60%    Recommendations:  Continue aspirin statin and beta-blocker  Continue  lisinopril 10 mg p.o. once a day  Continue current dose of beta-blocker  Home blood pressure monitoring  Patient is advised to bring the blood pressure cuff and verify the possible whitecoat syndrome  Home blood pressure monitoring  Advise labs including CBC CMP and lipids    Continue close monitoring  Follow-up in office in 6 months    Objective:    Vitals:  Vitals:    05/18/21 1413   BP: (!) 191/98   Pulse: 54   Resp: 18   SpO2: 95%   Weight: 100 kg (221 lb)  "  Height: 188 cm (74\")       Physical Exam:    General: Alert, cooperative, no distress, appears stated age  Head:  Normocephalic, atraumatic, mucous membranes moist  Eyes:  Conjunctiva/corneas clear, EOM's intact     Neck:  Supple,  no adenopathy;      Lungs: Clear to auscultation bilaterally, no wheezes rhonchi rales are noted  Chest wall: No tenderness  Heart::  Regular rate and rhythm, S1 and S2 normal, no murmur, rub or gallop  Abdomen: Soft, non-tender, nondistended bowel sounds active  Extremities: No cyanosis, clubbing, or edema  Pulses: 2+ and symmetric all extremities  Skin:  No rashes or lesions  Neuro/psych: A&O x3. CN II through XII are grossly intact with appropriate affect      Allergies:  Allergies   Allergen Reactions   • Neosporin [Bacitracin-Polymyxin B] Other (See Comments)     Burning sensation        Medication Review:     Current Outpatient Medications:   •  aspirin 81 MG EC tablet, Take 1 tablet by mouth Daily., Disp: 30 tablet, Rfl: 3  •  atorvastatin (LIPITOR) 40 MG tablet, Take 1 tablet by mouth Every Night., Disp: 90 tablet, Rfl: 3  •  Cholecalciferol (VITAMIN D3) 125 MCG (5000 UT) capsule capsule, Take 5,000 Units by mouth Daily., Disp: , Rfl:   •  lisinopril (PRINIVIL,ZESTRIL) 10 MG tablet, Take 1 tablet by mouth Daily., Disp: 90 tablet, Rfl: 3  •  metoprolol succinate XL (TOPROL-XL) 25 MG 24 hr tablet, Take 1 tablet by mouth Daily., Disp: 90 tablet, Rfl: 3  •  valACYclovir (VALTREX) 1000 MG tablet, valacyclovir 1 gram tablet, Disp: , Rfl:   •  vitamin C (ASCORBIC ACID) 250 MG tablet, Take 250 mg by mouth Daily., Disp: , Rfl:   •  Zinc 100 MG tablet, Take  by mouth., Disp: , Rfl:     Family History:  Family History   Problem Relation Age of Onset   • Hypertension Mother    • Hypertension Father    • Atrial fibrillation Sister    • Hypertension Brother        Past Medical History:  Past Medical History:   Diagnosis Date   • Coronary artery disease    • Hyperlipidemia    • Hypertension  "       Past surgical History:  Past Surgical History:   Procedure Laterality Date   • CARDIAC CATHETERIZATION Left 5/3/2020    Procedure: LEFT HEART CATH with possible PCI;  Surgeon: Eliezer Hall MD;  Location: [facility] CATH INVASIVE LOCATION;  Service: Cardiovascular;  Laterality: Left;  Local and IV sedation   • CORONARY ARTERY BYPASS GRAFT N/A 5/4/2020    Procedure: CORONARY ARTERY BYPASS WITH INTERNAL MAMMARY ARTERY GRAFT;  Surgeon: Yahir Ferris MD;  Location: [facility];  Service: Cardiothoracic;  Laterality: N/A;   • FINGER SURGERY     • TRANSESOPHAGEAL ECHOCARDIOGRAM (ALICE) N/A 5/4/2020    Procedure: TRANSESOPHAGEAL ECHOCARDIOGRAM WITH ANESTHESIA;  Surgeon: Yahir Ferris MD;  Location: [facility];  Service: Cardiothoracic;  Laterality: N/A;   • UVULECTOMY         Social History:  Social History     Socioeconomic History   • Marital status:      Spouse name: Not on file   • Number of children: Not on file   • Years of education: Not on file   • Highest education level: Not on file   Tobacco Use   • Smoking status: Never Smoker   • Smokeless tobacco: Never Used   Vaping Use   • Vaping Use: Never used   Substance and Sexual Activity   • Alcohol use: Yes     Comment: occasional- 1 drink every other month   • Drug use: Never   • Sexual activity: Defer       Review of Systems:  The following systems were reviewed as they relate to the cardiovascular system: Constitutional, Eyes, ENT, Cardiovascular, Respiratory, Gastrointestinal, Integumentary, Neurological, Psychiatric, Hematologic, Endocrine, Musculoskeletal, and Genitourinary. The pertinent cardiovascular findings are reported above with all other cardiovascular points within those systems being negative.    Diagnostic Study Review:     Current Electrocardiogram:    ECG 12 Lead    Date/Time: 5/18/2021 5:12 PM  Performed by: Eliezer Hall MD  Authorized by: Eliezer Hall MD   Comparison: compared with previous ECG    Similar to previous ECG  Rhythm: sinus rhythm and sinus bradycardia  Rate: normal  BPM: 54  Conduction: 1st degree AV block  QRS axis: normal  Other findings: non-specific ST-T wave changes    Clinical impression: abnormal EKG              NOTE: The following portions of the patient's history were reviewed and updated this visit as appropriate: allergies, current medications, past family history, past medical history, past social history, past surgical history and problem list.

## 2021-07-27 ENCOUNTER — HOSPITAL ENCOUNTER (EMERGENCY)
Facility: HOSPITAL | Age: 71
Discharge: HOME | End: 2021-07-27
Attending: EMERGENCY MEDICINE | Admitting: EMERGENCY MEDICINE

## 2021-07-27 ENCOUNTER — APPOINTMENT (OUTPATIENT)
Dept: CT IMAGING | Facility: HOSPITAL | Age: 71
End: 2021-07-27

## 2021-07-27 ENCOUNTER — APPOINTMENT (OUTPATIENT)
Dept: GENERAL RADIOLOGY | Facility: HOSPITAL | Age: 71
End: 2021-07-27

## 2021-07-27 VITALS
HEIGHT: 74 IN | DIASTOLIC BLOOD PRESSURE: 80 MMHG | RESPIRATION RATE: 18 BRPM | OXYGEN SATURATION: 100 % | SYSTOLIC BLOOD PRESSURE: 160 MMHG | WEIGHT: 214.51 LBS | TEMPERATURE: 97.8 F | HEART RATE: 61 BPM | BODY MASS INDEX: 27.53 KG/M2

## 2021-07-27 DIAGNOSIS — R42 DIZZINESS: Primary | ICD-10-CM

## 2021-07-27 LAB
ANION GAP SERPL CALCULATED.3IONS-SCNC: 9 MMOL/L (ref 5–15)
BASOPHILS # BLD AUTO: 0.1 10*3/MM3 (ref 0–0.2)
BASOPHILS NFR BLD AUTO: 1.3 % (ref 0–1.5)
BUN SERPL-MCNC: 20 MG/DL (ref 8–23)
BUN/CREAT SERPL: 16.8 (ref 7–25)
CALCIUM SPEC-SCNC: 9 MG/DL (ref 8.6–10.5)
CHLORIDE SERPL-SCNC: 107 MMOL/L (ref 98–107)
CO2 SERPL-SCNC: 25 MMOL/L (ref 22–29)
CREAT SERPL-MCNC: 1.19 MG/DL (ref 0.76–1.27)
DEPRECATED RDW RBC AUTO: 45.1 FL (ref 37–54)
EOSINOPHIL # BLD AUTO: 0.1 10*3/MM3 (ref 0–0.4)
EOSINOPHIL NFR BLD AUTO: 1.6 % (ref 0.3–6.2)
ERYTHROCYTE [DISTWIDTH] IN BLOOD BY AUTOMATED COUNT: 14 % (ref 12.3–15.4)
GFR SERPL CREATININE-BSD FRML MDRD: 61 ML/MIN/1.73
GLUCOSE SERPL-MCNC: 123 MG/DL (ref 65–99)
HCT VFR BLD AUTO: 42.3 % (ref 37.5–51)
HGB BLD-MCNC: 14.5 G/DL (ref 13–17.7)
HOLD SPECIMEN: NORMAL
LYMPHOCYTES # BLD AUTO: 1.6 10*3/MM3 (ref 0.7–3.1)
LYMPHOCYTES NFR BLD AUTO: 20.2 % (ref 19.6–45.3)
MCH RBC QN AUTO: 32.2 PG (ref 26.6–33)
MCHC RBC AUTO-ENTMCNC: 34.3 G/DL (ref 31.5–35.7)
MCV RBC AUTO: 94.1 FL (ref 79–97)
MONOCYTES # BLD AUTO: 0.7 10*3/MM3 (ref 0.1–0.9)
MONOCYTES NFR BLD AUTO: 9.4 % (ref 5–12)
NEUTROPHILS NFR BLD AUTO: 5.3 10*3/MM3 (ref 1.7–7)
NEUTROPHILS NFR BLD AUTO: 67.5 % (ref 42.7–76)
NRBC BLD AUTO-RTO: 0 /100 WBC (ref 0–0.2)
PLATELET # BLD AUTO: 316 10*3/MM3 (ref 140–450)
PMV BLD AUTO: 8.1 FL (ref 6–12)
POTASSIUM SERPL-SCNC: 4.9 MMOL/L (ref 3.5–5.2)
RBC # BLD AUTO: 4.5 10*6/MM3 (ref 4.14–5.8)
SODIUM SERPL-SCNC: 141 MMOL/L (ref 136–145)
TROPONIN T SERPL-MCNC: <0.01 NG/ML (ref 0–0.03)
WBC # BLD AUTO: 7.9 10*3/MM3 (ref 3.4–10.8)

## 2021-07-27 PROCEDURE — 71045 X-RAY EXAM CHEST 1 VIEW: CPT

## 2021-07-27 PROCEDURE — 80048 BASIC METABOLIC PNL TOTAL CA: CPT | Performed by: EMERGENCY MEDICINE

## 2021-07-27 PROCEDURE — 70450 CT HEAD/BRAIN W/O DYE: CPT

## 2021-07-27 PROCEDURE — 84484 ASSAY OF TROPONIN QUANT: CPT | Performed by: EMERGENCY MEDICINE

## 2021-07-27 PROCEDURE — 93005 ELECTROCARDIOGRAM TRACING: CPT | Performed by: EMERGENCY MEDICINE

## 2021-07-27 PROCEDURE — 85025 COMPLETE CBC W/AUTO DIFF WBC: CPT | Performed by: EMERGENCY MEDICINE

## 2021-07-27 PROCEDURE — 99283 EMERGENCY DEPT VISIT LOW MDM: CPT

## 2021-07-27 PROCEDURE — 93005 ELECTROCARDIOGRAM TRACING: CPT

## 2021-07-27 RX ORDER — SODIUM CHLORIDE 0.9 % (FLUSH) 0.9 %
10 SYRINGE (ML) INJECTION AS NEEDED
Status: DISCONTINUED | OUTPATIENT
Start: 2021-07-27 | End: 2021-07-27 | Stop reason: HOSPADM

## 2021-07-29 LAB — QT INTERVAL: 431 MS

## 2021-09-03 RX ORDER — METOPROLOL SUCCINATE 25 MG/1
25 TABLET, EXTENDED RELEASE ORAL DAILY
Qty: 90 TABLET | Refills: 1 | Status: SHIPPED | OUTPATIENT
Start: 2021-09-03 | End: 2021-12-08

## 2021-12-06 RX ORDER — GUAIFENESIN 600 MG/1
TABLET, EXTENDED RELEASE ORAL
COMMUNITY

## 2021-12-06 RX ORDER — MELOXICAM 15 MG/1
TABLET ORAL
COMMUNITY

## 2021-12-06 RX ORDER — IVERMECTIN 3 MG/1
TABLET ORAL
COMMUNITY

## 2021-12-06 RX ORDER — ERGOCALCIFEROL 1.25 MG/1
CAPSULE ORAL
COMMUNITY

## 2021-12-06 RX ORDER — ROSUVASTATIN CALCIUM 10 MG/1
10 TABLET, COATED ORAL DAILY
COMMUNITY
End: 2021-12-14 | Stop reason: SDUPTHER

## 2021-12-08 RX ORDER — METOPROLOL SUCCINATE 25 MG/1
25 TABLET, EXTENDED RELEASE ORAL DAILY
Qty: 90 TABLET | Refills: 1 | Status: SHIPPED | OUTPATIENT
Start: 2021-12-08 | End: 2022-09-06

## 2021-12-14 ENCOUNTER — OFFICE VISIT (OUTPATIENT)
Dept: CARDIOLOGY | Facility: CLINIC | Age: 71
End: 2021-12-14

## 2021-12-14 ENCOUNTER — LAB (OUTPATIENT)
Dept: CARDIOLOGY | Facility: CLINIC | Age: 71
End: 2021-12-14

## 2021-12-14 VITALS
OXYGEN SATURATION: 98 % | HEIGHT: 74 IN | WEIGHT: 210 LBS | RESPIRATION RATE: 18 BRPM | HEART RATE: 62 BPM | DIASTOLIC BLOOD PRESSURE: 97 MMHG | SYSTOLIC BLOOD PRESSURE: 175 MMHG | BODY MASS INDEX: 26.95 KG/M2

## 2021-12-14 DIAGNOSIS — I10 ESSENTIAL HYPERTENSION: Chronic | ICD-10-CM

## 2021-12-14 DIAGNOSIS — I21.4 SUBENDOCARDIAL MI FIRST EPISODE CARE (HCC): Primary | ICD-10-CM

## 2021-12-14 DIAGNOSIS — E78.5 DYSLIPIDEMIA: Chronic | ICD-10-CM

## 2021-12-14 DIAGNOSIS — I25.110 CORONARY ARTERY DISEASE INVOLVING NATIVE CORONARY ARTERY OF NATIVE HEART WITH UNSTABLE ANGINA PECTORIS (HCC): ICD-10-CM

## 2021-12-14 DIAGNOSIS — E78.5 DYSLIPIDEMIA: Primary | ICD-10-CM

## 2021-12-14 DIAGNOSIS — Z95.1 S/P CABG X 4: ICD-10-CM

## 2021-12-14 DIAGNOSIS — I25.118 CORONARY ARTERY DISEASE OF NATIVE ARTERY OF NATIVE HEART WITH STABLE ANGINA PECTORIS (HCC): ICD-10-CM

## 2021-12-14 LAB
CHOLEST SERPL-MCNC: ABNORMAL MG/DL
EXPIRATION DATE: ABNORMAL
HDLC SERPL-MCNC: 35 MG/DL (ref 40–60)
LDLC SERPL CALC-MCNC: ABNORMAL MG/DL
TRIGL SERPL-MCNC: 63 MG/DL (ref 0–150)

## 2021-12-14 PROCEDURE — 80061 LIPID PANEL: CPT | Performed by: INTERNAL MEDICINE

## 2021-12-14 PROCEDURE — 99214 OFFICE O/P EST MOD 30 MIN: CPT | Performed by: INTERNAL MEDICINE

## 2021-12-14 RX ORDER — ALBUTEROL SULFATE 90 UG/1
2 AEROSOL, METERED RESPIRATORY (INHALATION)
COMMUNITY
Start: 2021-07-13

## 2021-12-14 RX ORDER — ROSUVASTATIN CALCIUM 10 MG/1
10 TABLET, COATED ORAL DAILY
Qty: 90 TABLET | Refills: 3 | Status: SHIPPED | OUTPATIENT
Start: 2021-12-14 | End: 2022-12-13

## 2021-12-14 RX ORDER — LISINOPRIL 5 MG/1
5 TABLET ORAL DAILY
Qty: 90 TABLET | Refills: 3 | Status: SHIPPED | OUTPATIENT
Start: 2021-12-14

## 2021-12-14 NOTE — PROGRESS NOTES
Cardiology Office Visit      Encounter Date:  12/14/2021    Patient ID:   Keshawn Lr is a 71 y.o. male.      Reason For Followup:  Coronary artery disease  Non-ST elevation myocardial infarction    Brief Clinical History:  Dear  Provider, No Known    I had the pleasure of seeing Keshawn Lr today. As you are well aware, this is a 71 y.o. male recent hospitalization at [facility] for non-ST elevation myocardial infarction diagnosed with severe three-vessel coronary artery disease resulted with coronary artery bypass surgery    Interval History:  Patient is clinically doing better  Denies any symptoms of chest pain shortness of breath dizziness or syncope  Home blood pressure readings are very good  Patient had some nonspecific symptoms fatigue and weakness that lasted briefly and resolved most likely possible viral syndrome  Evaluation in July in the emergency room for some wobbly gait but resolved most likely not a problem    Assessment & Plan    Impressions:  Coronary artery disease  Non-ST elevation myocardial infarction  Hypertension  Hyperlipidemia  Multivessel coronary artery disease status post coronary artery bypass surgery  Possible whitecoat syndrome/home blood pressure readings are actually low per patient    Multivessel coronary artery disease  Significant obstructive coronary artery disease involving the right coronary artery left circumflex artery ramus branch and also diagonal system  Normal LV systolic function  Normal wall motion  Normal left-sided filling pressures  Estimated LV ejection fraction of 55 to 60%    Recommendations:  Continue aspirin statin and beta-blocker  Decrease the dose of lisinopril to 5 mg p.o. once a day secondary to low blood pressures at home  Continue current dose of beta-blocker  Home blood pressure monitoring  Patient is advised to bring the blood pressure cuff and verify the possible whitecoat syndrome  Home blood pressure monitoring  Lipids are optimal  Recent  "labs from the ER visit from July reviewed and discussed with patient  Continue current medical management  Continue close monitoring  Follow-up in office in 6 months    Objective:    Vitals:  Vitals:    12/14/21 1412   BP: 175/97   BP Location: Left arm   Patient Position: Sitting   Cuff Size: Large Adult   Pulse: 62   Resp: 18   SpO2: 98%   Weight: 95.3 kg (210 lb)   Height: 188 cm (74\")       Physical Exam:  General: Alert, cooperative, no distress, appears stated age  Head:  Normocephalic, atraumatic, mucous membranes moist  Eyes:  Conjunctiva/corneas clear, EOM's intact     Neck:  Supple,  no adenopathy;      Lungs: Clear to auscultation bilaterally, no wheezes rhonchi rales are noted  Chest wall: No tenderness  Heart::  Regular rate and rhythm, S1 and S2 normal, no murmur, rub or gallop  Abdomen: Soft, non-tender, nondistended bowel sounds active  Extremities: No cyanosis, clubbing, or edema  Pulses: 2+ and symmetric all extremities  Skin:  No rashes or lesions  Neuro/psych: A&O x3. CN II through XII are grossly intact with appropriate affect      Allergies:  Allergies   Allergen Reactions   • Neosporin [Bacitracin-Polymyxin B] Other (See Comments)     Burning sensation        Medication Review:     Current Outpatient Medications:   •  albuterol sulfate  (90 Base) MCG/ACT inhaler, Inhale 2 puffs., Disp: , Rfl:   •  aspirin 81 MG EC tablet, Take 1 tablet by mouth Daily., Disp: 30 tablet, Rfl: 3  •  Cholecalciferol (VITAMIN D3) 125 MCG (5000 UT) capsule capsule, Take 5,000 Units by mouth Daily., Disp: , Rfl:   •  ergocalciferol (ERGOCALCIFEROL) 1.25 MG (83887 UT) capsule, ergocalciferol (vitamin D2) 1,250 mcg (50,000 unit) capsule  TAKE 1 CAPSULE BY MOUTH EVERY WEEK, Disp: , Rfl:   •  guaiFENesin (MUCINEX) 600 MG 12 hr tablet, Mucus Relief  mg tablet, extended release  TAKE 1 TABLET BY MOUTH TWICE DAILY FOR 10 DAYS, Disp: , Rfl:   •  ivermectin (STROMECTOL) 3 MG tablet tablet, ivermectin 3 mg tablet, " Disp: , Rfl:   •  meloxicam (MOBIC) 15 MG tablet, meloxicam 15 mg tablet  TAKE 1 TABLET BY MOUTH EVERY DAY, Disp: , Rfl:   •  metoprolol succinate XL (TOPROL-XL) 25 MG 24 hr tablet, TAKE 1 TABLET BY MOUTH DAILY, Disp: 90 tablet, Rfl: 1  •  rosuvastatin (CRESTOR) 10 MG tablet, Take 1 tablet by mouth Daily., Disp: 90 tablet, Rfl: 3  •  valACYclovir (VALTREX) 1000 MG tablet, valacyclovir 1 gram tablet, Disp: , Rfl:   •  vitamin C (ASCORBIC ACID) 250 MG tablet, Take 250 mg by mouth Daily., Disp: , Rfl:   •  Zinc 100 MG tablet, Take  by mouth., Disp: , Rfl:   •  lisinopril (PRINIVIL,ZESTRIL) 5 MG tablet, Take 1 tablet by mouth Daily., Disp: 90 tablet, Rfl: 3    Family History:  Family History   Problem Relation Age of Onset   • Hypertension Mother    • Hypertension Father    • Atrial fibrillation Sister    • Hypertension Brother        Past Medical History:  Past Medical History:   Diagnosis Date   • Coronary artery disease    • Hyperlipidemia    • Hypertension        Past surgical History:  Past Surgical History:   Procedure Laterality Date   • CARDIAC CATHETERIZATION Left 5/3/2020    Procedure: LEFT HEART CATH with possible PCI;  Surgeon: Eliezer Hall MD;  Location: [facility] CATH INVASIVE LOCATION;  Service: Cardiovascular;  Laterality: Left;  Local and IV sedation   • CORONARY ARTERY BYPASS GRAFT N/A 5/4/2020    Procedure: CORONARY ARTERY BYPASS WITH INTERNAL MAMMARY ARTERY GRAFT;  Surgeon: Yahir Ferris MD;  Location: [facility];  Service: Cardiothoracic;  Laterality: N/A;   • FINGER SURGERY     • TRANSESOPHAGEAL ECHOCARDIOGRAM (ALICE) N/A 5/4/2020    Procedure: TRANSESOPHAGEAL ECHOCARDIOGRAM WITH ANESTHESIA;  Surgeon: Yahir Ferris MD;  Location: [facility];  Service: Cardiothoracic;  Laterality: N/A;   • UVULECTOMY         Social History:  Social History     Socioeconomic History   • Marital status:    Tobacco Use   • Smoking status: Never Smoker   • Smokeless tobacco: Never Used   Vaping  Use   • Vaping Use: Never used   Substance and Sexual Activity   • Alcohol use: Yes     Comment: occasional- 1 drink every other month   • Drug use: Never   • Sexual activity: Defer       Review of Systems:  The following systems were reviewed as they relate to the cardiovascular system: Constitutional, Eyes, ENT, Cardiovascular, Respiratory, Gastrointestinal, Integumentary, Neurological, Psychiatric, Hematologic, Endocrine, Musculoskeletal, and Genitourinary. The pertinent cardiovascular findings are reported above with all other cardiovascular points within those systems being negative.    Diagnostic Study Review:     Current Electrocardiogram:  Procedures      NOTE: The following portions of the patient's history were reviewed and updated this visit as appropriate: allergies, current medications, past family history, past medical history, past social history, past surgical history and problem list.

## 2022-06-14 ENCOUNTER — OFFICE VISIT (OUTPATIENT)
Dept: CARDIOLOGY | Facility: CLINIC | Age: 72
End: 2022-06-14

## 2022-06-14 VITALS
RESPIRATION RATE: 18 BRPM | DIASTOLIC BLOOD PRESSURE: 100 MMHG | WEIGHT: 212 LBS | OXYGEN SATURATION: 98 % | BODY MASS INDEX: 27.21 KG/M2 | HEART RATE: 62 BPM | HEIGHT: 74 IN | SYSTOLIC BLOOD PRESSURE: 164 MMHG

## 2022-06-14 DIAGNOSIS — I10 ESSENTIAL HYPERTENSION: Chronic | ICD-10-CM

## 2022-06-14 DIAGNOSIS — I21.4 SUBENDOCARDIAL MI FIRST EPISODE CARE: Primary | ICD-10-CM

## 2022-06-14 DIAGNOSIS — I25.118 CORONARY ARTERY DISEASE OF NATIVE ARTERY OF NATIVE HEART WITH STABLE ANGINA PECTORIS: ICD-10-CM

## 2022-06-14 DIAGNOSIS — Z95.1 S/P CABG X 4: ICD-10-CM

## 2022-06-14 DIAGNOSIS — E78.5 DYSLIPIDEMIA: ICD-10-CM

## 2022-06-14 PROCEDURE — 93000 ELECTROCARDIOGRAM COMPLETE: CPT | Performed by: INTERNAL MEDICINE

## 2022-06-14 PROCEDURE — 99214 OFFICE O/P EST MOD 30 MIN: CPT | Performed by: INTERNAL MEDICINE

## 2022-06-14 RX ORDER — ATORVASTATIN CALCIUM 40 MG/1
40 TABLET, FILM COATED ORAL
COMMUNITY
Start: 2022-03-08

## 2022-06-14 NOTE — PROGRESS NOTES
Cardiology Office Visit      Encounter Date:  06/14/2022    Patient ID:   Keshawn Lr is a 72 y.o. male.    Reason For Followup:  Coronary artery disease  Non-ST elevation myocardial infarction    Brief Clinical History:  Dear  Provider, No Known    I had the pleasure of seeing Keshawn Lr today. As you are well aware, this is a 72 y.o. male recent hospitalization at [facility] for non-ST elevation myocardial infarction diagnosed with severe three-vessel coronary artery disease resulted with coronary artery bypass surgery    Interval History:  Patient is clinically doing better  Denies any symptoms of chest pain shortness of breath dizziness or syncope  Home blood pressure readings are very good        Assessment & Plan    Impressions:  Coronary artery disease  Non-ST elevation myocardial infarction  Hypertension  Hyperlipidemia  Multivessel coronary artery disease status post coronary artery bypass surgery  Whitecoat hypertension  Multivessel coronary artery disease  Significant obstructive coronary artery disease involving the right coronary artery left circumflex artery ramus branch and also diagonal system  Normal LV systolic function  Normal wall motion  Normal left-sided filling pressures  Estimated LV ejection fraction of 55 to 60%    Recommendations:  Continue aspirin statin and beta-blocker  Decrease the dose of lisinopril to 5 mg p.o. once a day secondary to low blood pressures at home  Continue current dose of beta-blocker  Home blood pressure monitoring  Patient is advised to bring the blood pressure cuff and verify the possible whitecoat syndrome  Home blood pressure monitoring  Lipids are optimal  Continue current medical management  Continue close monitoring  Check labs including CBC CMP lipids  Continue current medical therapy with aspirin 81 mg p.o. once a day Lipitor 40 mg p.o. once a day lisinopril 5 mg p.o. once a day Toprol-XL 25 mg p.o. once a day Crestor 10 mg p.o. once a  "day    Follow-up in office in 6 months  Objective:    Vitals:  Vitals:    06/14/22 1415   BP: 164/100   BP Location: Left arm   Patient Position: Sitting   Cuff Size: Large Adult   Pulse: 62   Resp: 18   SpO2: 98%   Weight: 96.2 kg (212 lb)   Height: 188 cm (74\")       Physical Exam:    General: Alert, cooperative, no distress, appears stated age  Head:  Normocephalic, atraumatic, mucous membranes moist  Eyes:  Conjunctiva/corneas clear, EOM's intact     Neck:  Supple,  no adenopathy;      Lungs: Clear to auscultation bilaterally, no wheezes rhonchi rales are noted  Chest wall: No tenderness  Heart::  Regular rate and rhythm, S1 and S2 normal, no murmur, rub or gallop  Abdomen: Soft, non-tender, nondistended bowel sounds active  Extremities: No cyanosis, clubbing, or edema  Pulses: 2+ and symmetric all extremities  Skin:  No rashes or lesions  Neuro/psych: A&O x3. CN II through XII are grossly intact with appropriate affect      Allergies:  Allergies   Allergen Reactions   • Neosporin [Bacitracin-Polymyxin B] Other (See Comments)     Burning sensation        Medication Review:     Current Outpatient Medications:   •  albuterol sulfate  (90 Base) MCG/ACT inhaler, Inhale 2 puffs., Disp: , Rfl:   •  aspirin 81 MG EC tablet, Take 1 tablet by mouth Daily., Disp: 30 tablet, Rfl: 3  •  atorvastatin (LIPITOR) 40 MG tablet, Take 40 mg by mouth every night at bedtime., Disp: , Rfl:   •  Cholecalciferol (VITAMIN D3) 125 MCG (5000 UT) capsule capsule, Take 5,000 Units by mouth Daily., Disp: , Rfl:   •  ergocalciferol (ERGOCALCIFEROL) 1.25 MG (67282 UT) capsule, ergocalciferol (vitamin D2) 1,250 mcg (50,000 unit) capsule  TAKE 1 CAPSULE BY MOUTH EVERY WEEK, Disp: , Rfl:   •  guaiFENesin (MUCINEX) 600 MG 12 hr tablet, Mucus Relief  mg tablet, extended release  TAKE 1 TABLET BY MOUTH TWICE DAILY FOR 10 DAYS, Disp: , Rfl:   •  ivermectin (STROMECTOL) 3 MG tablet tablet, ivermectin 3 mg tablet, Disp: , Rfl:   •  " lisinopril (PRINIVIL,ZESTRIL) 5 MG tablet, Take 1 tablet by mouth Daily., Disp: 90 tablet, Rfl: 3  •  meloxicam (MOBIC) 15 MG tablet, meloxicam 15 mg tablet  TAKE 1 TABLET BY MOUTH EVERY DAY, Disp: , Rfl:   •  metoprolol succinate XL (TOPROL-XL) 25 MG 24 hr tablet, TAKE 1 TABLET BY MOUTH DAILY, Disp: 90 tablet, Rfl: 1  •  rosuvastatin (CRESTOR) 10 MG tablet, Take 1 tablet by mouth Daily., Disp: 90 tablet, Rfl: 3  •  valACYclovir (VALTREX) 1000 MG tablet, valacyclovir 1 gram tablet, Disp: , Rfl:   •  vitamin C (ASCORBIC ACID) 250 MG tablet, Take 250 mg by mouth Daily., Disp: , Rfl:   •  Zinc 100 MG tablet, Take  by mouth., Disp: , Rfl:     Family History:  Family History   Problem Relation Age of Onset   • Hypertension Mother    • Hypertension Father    • Atrial fibrillation Sister    • Hypertension Brother        Past Medical History:  Past Medical History:   Diagnosis Date   • Coronary artery disease    • Hyperlipidemia    • Hypertension        Past surgical History:  Past Surgical History:   Procedure Laterality Date   • CARDIAC CATHETERIZATION Left 5/3/2020    Procedure: LEFT HEART CATH with possible PCI;  Surgeon: Eliezer Hall MD;  Location: [facility] CATH INVASIVE LOCATION;  Service: Cardiovascular;  Laterality: Left;  Local and IV sedation   • CORONARY ARTERY BYPASS GRAFT N/A 5/4/2020    Procedure: CORONARY ARTERY BYPASS WITH INTERNAL MAMMARY ARTERY GRAFT;  Surgeon: Yahir Ferris MD;  Location: [facility];  Service: Cardiothoracic;  Laterality: N/A;   • FINGER SURGERY     • TRANSESOPHAGEAL ECHOCARDIOGRAM (ALICE) N/A 5/4/2020    Procedure: TRANSESOPHAGEAL ECHOCARDIOGRAM WITH ANESTHESIA;  Surgeon: Yahir Ferris MD;  Location: [facility];  Service: Cardiothoracic;  Laterality: N/A;   • UVULECTOMY         Social History:  Social History     Socioeconomic History   • Marital status:    Tobacco Use   • Smoking status: Never Smoker   • Smokeless tobacco: Never Used   Vaping Use   • Vaping Use:  Never used   Substance and Sexual Activity   • Alcohol use: Yes     Comment: occasional- 1 drink every other month   • Drug use: Never   • Sexual activity: Defer       Review of Systems:  The following systems were reviewed as they relate to the cardiovascular system: Constitutional, Eyes, ENT, Cardiovascular, Respiratory, Gastrointestinal, Integumentary, Neurological, Psychiatric, Hematologic, Endocrine, Musculoskeletal, and Genitourinary. The pertinent cardiovascular findings are reported above with all other cardiovascular points within those systems being negative.    Diagnostic Study Review:     Current Electrocardiogram:    ECG 12 Lead    Date/Time: 6/14/2022 2:50 PM  Performed by: Eliezer Hall MD  Authorized by: Eliezer Hall MD   Comparison: compared with previous ECG   Similar to previous ECG  Rhythm: sinus rhythm and sinus bradycardia  Rate: normal  BPM: 56  Conduction: 1st degree AV block  ST Segments: ST segments normal  T Waves: T waves normal  QRS axis: normal    Clinical impression: normal ECG              NOTE: The following portions of the patient's history were reviewed and updated this visit as appropriate: allergies, current medications, past family history, past medical history, past social history, past surgical history and problem list.

## 2022-09-06 RX ORDER — METOPROLOL SUCCINATE 25 MG/1
25 TABLET, EXTENDED RELEASE ORAL DAILY
Qty: 90 TABLET | Refills: 1 | Status: SHIPPED | OUTPATIENT
Start: 2022-09-06 | End: 2023-03-09

## 2022-12-13 ENCOUNTER — OFFICE VISIT (OUTPATIENT)
Dept: CARDIOLOGY | Facility: CLINIC | Age: 72
End: 2022-12-13

## 2022-12-13 VITALS
BODY MASS INDEX: 27.08 KG/M2 | HEIGHT: 74 IN | WEIGHT: 211 LBS | SYSTOLIC BLOOD PRESSURE: 179 MMHG | OXYGEN SATURATION: 97 % | HEART RATE: 58 BPM | DIASTOLIC BLOOD PRESSURE: 105 MMHG | RESPIRATION RATE: 18 BRPM

## 2022-12-13 DIAGNOSIS — I25.118 CORONARY ARTERY DISEASE OF NATIVE ARTERY OF NATIVE HEART WITH STABLE ANGINA PECTORIS: Primary | ICD-10-CM

## 2022-12-13 DIAGNOSIS — I10 ESSENTIAL HYPERTENSION: Chronic | ICD-10-CM

## 2022-12-13 DIAGNOSIS — I21.4 SUBENDOCARDIAL MI FIRST EPISODE CARE: ICD-10-CM

## 2022-12-13 DIAGNOSIS — Z95.1 S/P CABG X 4: ICD-10-CM

## 2022-12-13 DIAGNOSIS — I25.110 CORONARY ARTERY DISEASE INVOLVING NATIVE CORONARY ARTERY OF NATIVE HEART WITH UNSTABLE ANGINA PECTORIS: ICD-10-CM

## 2022-12-13 DIAGNOSIS — E78.5 DYSLIPIDEMIA: Chronic | ICD-10-CM

## 2022-12-13 PROCEDURE — 93000 ELECTROCARDIOGRAM COMPLETE: CPT | Performed by: INTERNAL MEDICINE

## 2022-12-13 PROCEDURE — 99214 OFFICE O/P EST MOD 30 MIN: CPT | Performed by: INTERNAL MEDICINE

## 2022-12-13 RX ORDER — LISINOPRIL 5 MG/1
TABLET ORAL
COMMUNITY
End: 2022-12-13 | Stop reason: SDUPTHER

## 2022-12-13 RX ORDER — ATORVASTATIN CALCIUM 20 MG/1
TABLET, FILM COATED ORAL
COMMUNITY
End: 2022-12-13

## 2022-12-13 NOTE — PROGRESS NOTES
Cardiology Office Visit      Encounter Date:  12/13/2022    Patient ID:   Keshawn Lr is a 72 y.o. male.    Reason For Followup:  Coronary artery disease  Non-ST elevation myocardial infarction    Brief Clinical History:  Dear  Provider, No Known    I had the pleasure of seeing Keshawn Lr today. As you are well aware, this is a 72 y.o. male recent hospitalization at [facility] for non-ST elevation myocardial infarction diagnosed with severe three-vessel coronary artery disease resulted with coronary artery bypass surgery    Interval History:  Patient is clinically doing better  Denies any symptoms of chest pain shortness of breath dizziness or syncope  Home blood pressure readings are very good        Assessment & Plan    Impressions:  Coronary artery disease  Non-ST elevation myocardial infarction  Hypertension  Hyperlipidemia  Multivessel coronary artery disease status post coronary artery bypass surgery  Whitecoat hypertension  Multivessel coronary artery disease  Significant obstructive coronary artery disease involving the right coronary artery left circumflex artery ramus branch and also diagonal system  Normal LV systolic function  Normal wall motion  Normal left-sided filling pressures  Estimated LV ejection fraction of 55 to 60%    Recommendations:  Continue aspirin statin and beta-blocker  Decrease the dose of lisinopril to 5 mg p.o. once a day secondary to low blood pressures at home  Continue current dose of beta-blocker  Home blood pressure monitoring  Patient is advised to bring the blood pressure cuff and verify the possible whitecoat syndrome  Home blood pressure monitoring  Lipids are optimal  Continue current medical management  Continue close monitoring  Check labs including CBC CMP lipids  Continue current medical therapy with aspirin 81 mg p.o. once a day Lipitor 40 mg p.o. once a day lisinopril 5 mg p.o. once a day Toprol-XL 25 mg p.o. once a day Crestor 10 mg p.o. once a  "day  Whitecoat hypertension  Patient is monitoring blood pressure at home Home blood pressure readings are optimal  Recent labs and work-up reviewed and discussed with patient  Follow-up in office in 6 months      Objective:    Vitals:  Vitals:    12/13/22 1350   BP: (!) 179/105   BP Location: Left arm   Patient Position: Sitting   Cuff Size: Large Adult   Pulse: 58   Resp: 18   SpO2: 97%   Weight: 95.7 kg (211 lb)   Height: 188 cm (74\")       Physical Exam:    General: Alert, cooperative, no distress, appears stated age  Head:  Normocephalic, atraumatic, mucous membranes moist  Eyes:  Conjunctiva/corneas clear, EOM's intact     Neck:  Supple,  no adenopathy;      Lungs: Clear to auscultation bilaterally, no wheezes rhonchi rales are noted  Chest wall: No tenderness  Heart::  Regular rate and rhythm, S1 and S2 normal, no murmur, rub or gallop  Abdomen: Soft, non-tender, nondistended bowel sounds active  Extremities: No cyanosis, clubbing, or edema  Pulses: 2+ and symmetric all extremities  Skin:  No rashes or lesions  Neuro/psych: A&O x3. CN II through XII are grossly intact with appropriate affect      Allergies:  Allergies   Allergen Reactions   • Neosporin [Bacitracin-Polymyxin B] Other (See Comments)     Burning sensation        Medication Review:     Current Outpatient Medications:   •  albuterol sulfate  (90 Base) MCG/ACT inhaler, Inhale 2 puffs., Disp: , Rfl:   •  aspirin 81 MG EC tablet, Take 1 tablet by mouth Daily., Disp: 30 tablet, Rfl: 3  •  atorvastatin (LIPITOR) 40 MG tablet, Take 40 mg by mouth every night at bedtime., Disp: , Rfl:   •  Cholecalciferol (VITAMIN D3) 125 MCG (5000 UT) capsule capsule, Take 5,000 Units by mouth Daily., Disp: , Rfl:   •  ergocalciferol (ERGOCALCIFEROL) 1.25 MG (63164 UT) capsule, ergocalciferol (vitamin D2) 1,250 mcg (50,000 unit) capsule  TAKE 1 CAPSULE BY MOUTH EVERY WEEK, Disp: , Rfl:   •  guaiFENesin (MUCINEX) 600 MG 12 hr tablet, Mucus Relief  mg tablet, " extended release  TAKE 1 TABLET BY MOUTH TWICE DAILY FOR 10 DAYS, Disp: , Rfl:   •  ivermectin (STROMECTOL) 3 MG tablet tablet, ivermectin 3 mg tablet, Disp: , Rfl:   •  lisinopril (PRINIVIL,ZESTRIL) 5 MG tablet, Take 1 tablet by mouth Daily., Disp: 90 tablet, Rfl: 3  •  meloxicam (MOBIC) 15 MG tablet, meloxicam 15 mg tablet  TAKE 1 TABLET BY MOUTH EVERY DAY, Disp: , Rfl:   •  metoprolol succinate XL (TOPROL-XL) 25 MG 24 hr tablet, TAKE 1 TABLET BY MOUTH DAILY, Disp: 90 tablet, Rfl: 1  •  valACYclovir (VALTREX) 1000 MG tablet, valacyclovir 1 gram tablet, Disp: , Rfl:   •  vitamin C (ASCORBIC ACID) 250 MG tablet, Take 250 mg by mouth Daily., Disp: , Rfl:   •  Zinc 100 MG tablet, Take  by mouth., Disp: , Rfl:     Family History:  Family History   Problem Relation Age of Onset   • Hypertension Mother    • Hypertension Father    • Atrial fibrillation Sister    • Hypertension Brother        Past Medical History:  Past Medical History:   Diagnosis Date   • Coronary artery disease    • Hyperlipidemia    • Hypertension        Past surgical History:  Past Surgical History:   Procedure Laterality Date   • CARDIAC CATHETERIZATION Left 5/3/2020    Procedure: LEFT HEART CATH with possible PCI;  Surgeon: Eliezer Hall MD;  Location: [facility] CATH INVASIVE LOCATION;  Service: Cardiovascular;  Laterality: Left;  Local and IV sedation   • CORONARY ARTERY BYPASS GRAFT N/A 5/4/2020    Procedure: CORONARY ARTERY BYPASS WITH INTERNAL MAMMARY ARTERY GRAFT;  Surgeon: Yahir Ferris MD;  Location: [facility];  Service: Cardiothoracic;  Laterality: N/A;   • FINGER SURGERY     • TRANSESOPHAGEAL ECHOCARDIOGRAM (ALICE) N/A 5/4/2020    Procedure: TRANSESOPHAGEAL ECHOCARDIOGRAM WITH ANESTHESIA;  Surgeon: Yahir Ferris MD;  Location: [facility];  Service: Cardiothoracic;  Laterality: N/A;   • UVULECTOMY         Social History:  Social History     Socioeconomic History   • Marital status:    Tobacco Use   • Smoking status:  Never   • Smokeless tobacco: Never   Vaping Use   • Vaping Use: Never used   Substance and Sexual Activity   • Alcohol use: Yes     Comment: occasional- 1 drink every other month   • Drug use: Never   • Sexual activity: Defer       Review of Systems:  The following systems were reviewed as they relate to the cardiovascular system: Constitutional, Eyes, ENT, Cardiovascular, Respiratory, Gastrointestinal, Integumentary, Neurological, Psychiatric, Hematologic, Endocrine, Musculoskeletal, and Genitourinary. The pertinent cardiovascular findings are reported above with all other cardiovascular points within those systems being negative.    Diagnostic Study Review:     Current Electrocardiogram:    ECG 12 Lead    Date/Time: 12/13/2022 4:32 PM  Performed by: Eliezer Hall MD  Authorized by: Eliezer Hall MD   Comparison: compared with previous ECG   Similar to previous ECG  Rhythm: sinus rhythm  Rate: normal  BPM: 58  Conduction: 1st degree AV block  ST Segments: ST segments normal  T Waves: T waves normal  QRS axis: normal    Clinical impression: normal ECG              NOTE: The following portions of the patient's history were reviewed and updated this visit as appropriate: allergies, current medications, past family history, past medical history, past social history, past surgical history and problem list.

## 2023-03-09 RX ORDER — METOPROLOL SUCCINATE 25 MG/1
25 TABLET, EXTENDED RELEASE ORAL DAILY
Qty: 90 TABLET | Refills: 1 | Status: SHIPPED | OUTPATIENT
Start: 2023-03-09

## 2023-08-07 NOTE — PROGRESS NOTES
Cardiology Office Follow Up Visit      Primary Care Provider:  Marshall Guidry MD    Reason for f/u:     Hospital Follow-Up chest pain, HTN      Subjective       History of Present Illness       Keshawn Lr is a 73 y.o. male seen in clinic today for hospital follow-up.    Patient has a past cardiac history of CAD s/p CABG in 2020.  Periop ALICE showed an EF = 60%.  PMH includes HTN and HLD.  He recently presented to the ER with c/o chest pain.  His BP was 200/100 on arrival.  He was ruled out for ACS and discharged.      Patient tells me that prior to visiting the ER, he had experienced a transient localized chest discomfort at the left sternal border, which has since resolved and not reoccurred.  This is dissimilar to his prior angina that he describes as a dull chest discomfort.  Since his discharge, patient has been jogging without chest discomfort.  He denies SOA.  He has brought his blood pressure diary with him, which shows SBP readings in the 150-170s.  There are rare outliers that are higher, in which he reports sometimes feeling a dull headache or fogginess with this.  He tells me he is compliant with his BP medications.        ASSESSMENT/PLAN:      Diagnoses and all orders for this visit:    1. Essential hypertension (Primary)    2. Coronary artery disease involving native coronary artery of native heart without angina pectoris    Other orders  -     Discontinue: amLODIPine (NORVASC) 5 MG tablet; Take 1 tablet by mouth Daily.  Dispense: 30 tablet; Refill: 11            MEDICAL DECISION MAKING:    BP is uncontrolled.  Will start amlodipine 5 mg PO daily.  Will not increase beta blocker d/t relative bradycardia.  Will not increase ACEi.  Last BMP 7/8/23: K 4.4, Cr 1.21.        RTC in 4-6 weeks.          Past Medical History:   Diagnosis Date    Coronary artery disease     Hyperlipidemia     Hypertension        Past Surgical History:   Procedure Laterality Date    CARDIAC CATHETERIZATION Left 5/3/2020     Procedure: LEFT HEART CATH with possible PCI;  Surgeon: Eliezer Hall MD;  Location: [facility] CATH INVASIVE LOCATION;  Service: Cardiovascular;  Laterality: Left;  Local and IV sedation    CORONARY ARTERY BYPASS GRAFT N/A 5/4/2020    Procedure: CORONARY ARTERY BYPASS WITH INTERNAL MAMMARY ARTERY GRAFT;  Surgeon: Yahir Ferris MD;  Location: [facility];  Service: Cardiothoracic;  Laterality: N/A;    FINGER SURGERY      TRANSESOPHAGEAL ECHOCARDIOGRAM (ALICE) N/A 5/4/2020    Procedure: TRANSESOPHAGEAL ECHOCARDIOGRAM WITH ANESTHESIA;  Surgeon: Yahir Ferris MD;  Location: [facility];  Service: Cardiothoracic;  Laterality: N/A;    UVULECTOMY           Current Outpatient Medications:     aspirin 81 MG EC tablet, Take 1 tablet by mouth Daily., Disp: 30 tablet, Rfl: 3    atorvastatin (LIPITOR) 40 MG tablet, Take 1 tablet by mouth every night at bedtime., Disp: , Rfl:     Cholecalciferol (VITAMIN D3) 125 MCG (5000 UT) capsule capsule, Take 1 capsule by mouth Daily., Disp: , Rfl:     lisinopril (PRINIVIL,ZESTRIL) 5 MG tablet, Take 1 tablet by mouth Daily., Disp: 90 tablet, Rfl: 3    metoprolol succinate XL (TOPROL-XL) 25 MG 24 hr tablet, TAKE 1 TABLET BY MOUTH DAILY, Disp: 90 tablet, Rfl: 0    valACYclovir (VALTREX) 1000 MG tablet, valacyclovir 1 gram tablet, Disp: , Rfl:     vitamin C (ASCORBIC ACID) 250 MG tablet, Take 1 tablet by mouth Daily., Disp: , Rfl:     Zinc 100 MG tablet, Take  by mouth., Disp: , Rfl:     amLODIPine (NORVASC) 5 MG tablet, TAKE 1 TABLET BY MOUTH DAILY, Disp: 90 tablet, Rfl: 1    Social History     Socioeconomic History    Marital status:    Tobacco Use    Smoking status: Never    Smokeless tobacco: Never   Vaping Use    Vaping Use: Never used   Substance and Sexual Activity    Alcohol use: Yes     Comment: occasional- 1 drink every other month    Drug use: Never    Sexual activity: Defer       Family History   Problem Relation Age of Onset    Hypertension Mother      "Hypertension Father     Atrial fibrillation Sister     Hypertension Brother        The following portions of the patient's history were reviewed and updated as appropriate: allergies, current medications, past family history, past medical history, past social history, past surgical history and problem list.    ROS  Pulse 66   Ht 188 cm (74\")   Wt 91.6 kg (202 lb)   SpO2 99%   BMI 25.94 kg/mý .  Objective     Physical Exam    Physical Exam:  Neuro:  CV:  Resp:  GI:  Ext:  Pysch: AAOx3, no gross deficits  S1S2 RRR, no murmur  Non-labored, CTA  BS+, abd soft  Pedal pulses palp, no edema  Calm and cooperative       Procedures    EKG ordered by and reviewed by me in office         "

## 2023-08-08 ENCOUNTER — OFFICE VISIT (OUTPATIENT)
Dept: CARDIOLOGY | Facility: CLINIC | Age: 73
End: 2023-08-08
Payer: COMMERCIAL

## 2023-08-08 VITALS — BODY MASS INDEX: 25.93 KG/M2 | WEIGHT: 202 LBS | HEART RATE: 66 BPM | OXYGEN SATURATION: 99 % | HEIGHT: 74 IN

## 2023-08-08 DIAGNOSIS — I10 ESSENTIAL HYPERTENSION: Primary | Chronic | ICD-10-CM

## 2023-08-08 DIAGNOSIS — I25.10 CORONARY ARTERY DISEASE INVOLVING NATIVE CORONARY ARTERY OF NATIVE HEART WITHOUT ANGINA PECTORIS: ICD-10-CM

## 2023-08-08 PROCEDURE — 99214 OFFICE O/P EST MOD 30 MIN: CPT | Performed by: NURSE PRACTITIONER

## 2023-08-08 RX ORDER — AMLODIPINE BESYLATE 5 MG/1
5 TABLET ORAL DAILY
Qty: 30 TABLET | Refills: 11 | Status: SHIPPED | OUTPATIENT
Start: 2023-08-08 | End: 2023-08-08

## 2023-08-08 RX ORDER — AMLODIPINE BESYLATE 5 MG/1
5 TABLET ORAL DAILY
Qty: 90 TABLET | Refills: 1 | Status: SHIPPED | OUTPATIENT
Start: 2023-08-08

## 2023-08-17 ENCOUNTER — TELEPHONE (OUTPATIENT)
Dept: CARDIOLOGY | Facility: CLINIC | Age: 73
End: 2023-08-17
Payer: COMMERCIAL

## 2023-08-17 NOTE — TELEPHONE ENCOUNTER
----- Message from EMMA Ulloa sent at 8/16/2023  3:31 PM EDT -----  He can take it morning or evening, whichever works best for him.  He can take it with his other meds if preferred.    ----- Message -----  From: Sophia Costa MA  Sent: 8/15/2023  11:07 AM EDT  To: EMMA Ulloa    Pt was in to see you last week- states you added a BP med and they weren't sure what time of day this needs to be taken and wanted to check.  Wasn't sure if it needed to be at the same time as he takes the other BP meds.  Please advise.

## 2023-08-25 ENCOUNTER — TELEPHONE (OUTPATIENT)
Dept: CARDIOLOGY | Facility: CLINIC | Age: 73
End: 2023-08-25

## 2023-08-25 RX ORDER — AMLODIPINE BESYLATE 10 MG/1
10 TABLET ORAL DAILY
Qty: 30 TABLET | Refills: 11 | Status: SHIPPED | OUTPATIENT
Start: 2023-08-25

## 2023-08-25 NOTE — TELEPHONE ENCOUNTER
Caller: KORTNEY WARNER    Relationship: Emergency Contact    Best call back number: [phone]    What is the best time to reach you: ANYTIME      What was the call regarding: PATIENT'S NEW MEDICATION-AMLODPINE IS NOT BRINGING THE BP DOWN. IN THE EVENING WITH THIS MEDICATION THE BP RUNS: 8.24.23 182/96, 8.23.23 158/83 AND 8.22.23 175/92. PATIENT WANTS TO KNOW IF MEDICATION ADJUSTMENTS SHOULD BE MADE BEFORE SEPTEMBER APPOINTMENT.    Is it okay if the provider responds through MyChart: YES

## 2023-08-28 RX ORDER — AMLODIPINE BESYLATE 10 MG/1
10 TABLET ORAL DAILY
Qty: 90 TABLET | OUTPATIENT
Start: 2023-08-28

## 2023-09-11 ENCOUNTER — TELEPHONE (OUTPATIENT)
Dept: CARDIOLOGY | Facility: CLINIC | Age: 73
End: 2023-09-11
Payer: COMMERCIAL

## 2023-09-11 NOTE — TELEPHONE ENCOUNTER
Niki lao APRN [place], Tracy G, MA  Ask patient to try taking the amlodipine in the morning and lisinopril in the evening.  Perhaps with the different onset and peak times of the meds, this might work better.  We just started the new med about a week ago too, so we won't see full effect for another 3 weeks or so.       made several attempts but unable to reach pt.  Left v/m for him to return my call.   Previous Messages       ----- Message -----  From: Sophia Costa MA  Sent: 8/17/2023  11:19 AM EDT  To: EMMA Ulloa    Pt states he has been taking the Amlodipine that was added in early evening, but he has been noticing his BP is running high around bedtime.  150's/160's over 80-90's  and is wondering if he needs a medication adjustment.  Morning BP's are good 120's over 70's.  Please advise.    ----- Message -----  From: Niki Chambers APRN  Sent: 8/16/2023   3:32 PM EDT  To: Sophia Costa MA    He can take it morning or evening, whichever works best for him.  He can take it with his other meds if preferred.    ----- Message -----  From: Sophia Costa MA  Sent: 8/15/2023  11:07 AM EDT  To: EMMA Ulloa    Pt was in to see you last week- states you added a BP med and they weren't sure what time of day this needs to be taken and wanted to check.  Wasn't sure if it needed to be at the same time as he takes the other BP meds.  Please advise.

## 2023-09-19 ENCOUNTER — OFFICE VISIT (OUTPATIENT)
Dept: CARDIOLOGY | Facility: CLINIC | Age: 73
End: 2023-09-19
Payer: COMMERCIAL

## 2023-09-19 VITALS
DIASTOLIC BLOOD PRESSURE: 88 MMHG | HEART RATE: 62 BPM | BODY MASS INDEX: 25.8 KG/M2 | WEIGHT: 201 LBS | SYSTOLIC BLOOD PRESSURE: 174 MMHG | OXYGEN SATURATION: 97 % | HEIGHT: 74 IN

## 2023-09-19 DIAGNOSIS — I25.110 CORONARY ARTERY DISEASE INVOLVING NATIVE CORONARY ARTERY OF NATIVE HEART WITH UNSTABLE ANGINA PECTORIS: ICD-10-CM

## 2023-09-19 DIAGNOSIS — I25.10 CORONARY ARTERY DISEASE INVOLVING NATIVE CORONARY ARTERY OF NATIVE HEART WITHOUT ANGINA PECTORIS: ICD-10-CM

## 2023-09-19 DIAGNOSIS — I21.4 SUBENDOCARDIAL MI FIRST EPISODE CARE: ICD-10-CM

## 2023-09-19 DIAGNOSIS — E78.5 DYSLIPIDEMIA: ICD-10-CM

## 2023-09-19 DIAGNOSIS — Z95.1 S/P CABG X 4: ICD-10-CM

## 2023-09-19 DIAGNOSIS — I25.118 CORONARY ARTERY DISEASE OF NATIVE ARTERY OF NATIVE HEART WITH STABLE ANGINA PECTORIS: ICD-10-CM

## 2023-09-19 DIAGNOSIS — I10 ESSENTIAL HYPERTENSION: Primary | ICD-10-CM

## 2023-09-19 PROCEDURE — 99214 OFFICE O/P EST MOD 30 MIN: CPT | Performed by: INTERNAL MEDICINE

## 2023-09-19 RX ORDER — AMLODIPINE BESYLATE 5 MG/1
5 TABLET ORAL DAILY
Qty: 90 TABLET | Refills: 3 | Status: SHIPPED | OUTPATIENT
Start: 2023-09-19

## 2023-09-19 RX ORDER — LISINOPRIL 20 MG/1
20 TABLET ORAL DAILY
Qty: 90 TABLET | Refills: 3 | Status: SHIPPED | OUTPATIENT
Start: 2023-09-19

## 2023-09-19 NOTE — PROGRESS NOTES
Cardiology Office Visit      Encounter Date:  09/19/2023    Patient ID:   Keshawn Lr is a 73 y.o. male.    Reason For Followup:  Coronary artery disease  Non-ST elevation myocardial infarction    Brief Clinical History:  Dear  Provider, No Known    I had the pleasure of seeing Keshawn Lr today. As you are well aware, this is a 73 y.o. male recent hospitalization at [facility] for non-ST elevation myocardial infarction diagnosed with severe three-vessel coronary artery disease resulted with coronary artery bypass surgery    Interval History:  Patient is clinically doing better  Denies any symptoms of chest pain shortness of breath dizziness or syncope  Home blood pressure readings are elevated with recent adjustments in the medications with some mild improvement in the blood pressure  Increased blood pressure with exercise      Assessment & Plan    Impressions:  Coronary artery disease  Non-ST elevation myocardial infarction  Hypertension  Hyperlipidemia  Multivessel coronary artery disease status post coronary artery bypass surgery  Whitecoat hypertension  Multivessel coronary artery disease  Significant obstructive coronary artery disease involving the right coronary artery left circumflex artery ramus branch and also diagonal system  Normal LV systolic function  Normal wall motion  Normal left-sided filling pressures  Estimated LV ejection fraction of 55 to 60%  Elevated abnormal calcium score  Recommendations:  Continue aspirin statin and beta-blocker  Decrease the dose of lisinopril to 5 mg p.o. once a day secondary to low blood pressures at home  Continue current dose of beta-blocker  Home blood pressure monitoring  Patient is advised to bring the blood pressure cuff and verify the possible whitecoat syndrome  Home blood pressure monitoring  Lipids are optimal  Continue current medical management  Continue close monitoring  Check labs including CBC CMP lipids  Continue current medical therapy with  aspirin 81 mg p.o. once a day Lipitor 40 mg p.o. once a dayToprol-XL 25 mg p.o. once a day Crestor 10 mg p.o. once a day  Increase the dose of lisinopril from 5 to 10 mg p.o. once a day and eventually 20 mg p.o. once a day as long as blood pressure tolerates and decrease the dose of Norvasc to 5 mg p.o. once a day from 10 mg p.o. once a day and then eventually stop the Norvasc if the blood pressure is under control without needing for Norvasc  Patient is advised to bring his blood pressure cuff when he comes with plain treadmill stress test  Prior work-up reviewed and discussed with patient  Recent labs and work-up that was done during the emergency room visit reviewed and discussed with patient  Whitecoat hypertension  Patient is monitoring blood pressure at home Home blood pressure readings are optimal  Recent labs and work-up reviewed and discussed with patient  Follow-up in office in 6 months            Lab Results   Component Value Date    GLUCOSE 106 (H) 07/08/2023    BUN 28 (H) 07/08/2023    CREATININE 1.21 07/08/2023    EGFR 64.0 07/08/2023    BCR 23.1 07/08/2023    K 4.4 07/08/2023    CO2 24.0 07/08/2023    CALCIUM 9.0 07/08/2023    ALBUMIN 4.20 05/05/2020    BILITOT 0.7 05/03/2020    AST 35 05/03/2020    ALT 31 05/03/2020     Results for orders placed in visit on 05/04/20    Emergent/Open-Heart Anesthesia ALICE    Narrative  Procedure Performed: Emergent/Open-Heart Anesthesia ALICE  Start Time:  End Time:    Preanesthesia Checklist:  Patient identified, IV assessed, risks and benefits discussed, monitors and equipment assessed, procedure being performed at surgeon's request and anesthesia consent obtained.    General Procedure Information  Diagnostic Indications for Echo:  assessment of surgical repair and hemodynamic monitoring  Location performed:  OR  Intubated  Bite block placed  Heart visualized  Probe Insertion:  Easy  Probe Type:  Biplane and multiplane  Modalities:  Color flow mapping, pulse wave  Doppler and continuous wave Doppler    Echocardiographic and Doppler Measurements    Ventricles    Right Ventricle:  Cavity size normal.  Hypertrophy not present.  Thrombus not present.  Global function normal.  Ejection Fraction 60%.  Left Ventricle:  Cavity size normal.  Hypertrophy present.  Thrombus not present.  Global Function normal.  Ejection Fraction 60%.    Ventricular Regional Function:  1- Basal Anteroseptal:  normal  2- Basal Anterior:  normal  3- Basal Anterolateral:  normal  4- Basal Inferolateral:  normal  5- Basal Inferior:  normal  6- Basal Inferoseptal:  normal  7- Mid Anteroseptal:  normal  8- Mid Anterior:  normal  9- Mid Anterolateral:  normal  10- Mid Inferolateral:  normal  11- Mid Inferior:  normal  12- Mid Inferoseptal:  normal  13- Apical Anterior:  normal  14- Apical Lateral:  normal  15- Apical Inferior:  normal  16- Apical Septal:  normal  17- Lutz:  normal      Valves    Aortic Valve:  Annulus normal.  Stenosis not present.  Regurgitation trace.  Leaflets normal.  Leaflet motions normal.    Mitral Valve:  Annulus normal.  Stenosis not present.  Regurgitation trace.  Leaflets normal.  Leaflet motions normal.    Tricuspid Valve:  Annulus normal.  Stenosis not present.  Regurgitation absent.  Leaflets normal.  Leaflet motions normal.  Pulmonic Valve:  Annulus normal.  Stenosis not present.  Regurgitation absent.        Aorta    Ascending Aorta:  Size normal.  Dissection not present.  Plaque thickness less than 3 mm.  Mobile plaque not present.  Aortic Arch:  Size normal.  Dissection not present.  Plaque thickness less than 3 mm.  Mobile plaque not present.  Descending Aorta:  Size normal.  Dissection not present.  Plaque thickness less than 3 mm.  Mobile plaque not present.        Atria    Right Atrium:  Size normal.  Left Atrium:  Size normal.  Spontaneous echo contrast not present.  Thrombus not present.  Tumor not present.  Device not present.  Left atrial appendage  normal.      Septa    Atrial Septum:  Intra-atrial septal morphology normal.    Ventricular Septum:  Intra-ventricular septum morphology normal.        Other Findings  Pleural Effusion:  none  Pulmonary Arteries:  normal      Anesthesia Information  Performed Personally  Anesthesiologist:  Vega Pierson MD      Echocardiogram Comments:  Post cpb no change     Results for orders placed during the hospital encounter of 05/02/20    Cardiac Catheterization/Vascular Study    Narrative  Table formatting from the original result was not included.  Cardiac Catheterization Operative Report    Keshawn Lr  [id number]  5/3/2020  @PCP@    He underwent cardiac catheterization.    Indications for the procedure include: acute coronary syndrome.    Indication  Non-ST elevation myocardial infarction  Chest discomfort  Hypertension  Hyperlipidemia    Procedures  Selective left and right coronary angiogram  Left heart catheterization  Left ventriculogram    Procedure Details:  The risks, benefits, complications, treatment options, and expected outcomes were discussed with the patient. The patient and/or family concurred with the proposed plan, giving informed consent.    After informed consent the patient was brought to the cath lab after appropriate IV hydration was begun and oral premedication was given. He was further sedated with fentanyl. He was prepped and draped in the usual manner. Using the modified Seldinger access technique, a 6 Maori sheath was placed in the femoral artery. A left heart catheterization with coronary arteriography was performed. Findings are discussed below.    After the procedure was completed, sedation was stopped and the sheaths and catheters were all removed. Hemostasis was achieved per established hospital protocols.    Findings:    Hemodynamics  Central aortic pressure systolic of 111 diastolic 71 with a mean pressure of 88  LV end-diastolic pressure was 10 mmHg  Left Main  angiographically  normal  RCA  codominant vessel  Mid long segment angiographic 70 to 80% stenosis  Distal focal area of 99% stenosis  LAD  mid 20 to 30% stenosis  Still focal area of 30% stenosis  First diagonal branch is a small to moderate sized vessel bifurcates into 2 branches both of them have a 99% stenosis  Second diagonal branch is a small sized vessel has ostial 90% stenosis  Circ  mid 95% stenosis  Bifurcates into 2 marginal branches  Lower marginal branch is proximal 50% stenosis  Moderate sized ramus branch with ostial 30 to 40% stenosis in the proximal 90% stenosis  LV Normal LV systolic function with normal wall motion with estimated LV ejection fraction of 55 to 60%  Left ventricular end-diastolic pressure was 10 mmHg  Coronary dominance  codominant system    Estimated Blood Loss:  Minimal    Complications:  None; patient tolerated the procedure well.    Disposition: PACU - hemodynamically stable.    Condition: stable    Impressions:  Chest discomfort  Non-ST elevation myocardial infarction  Hypertension  Hyperlipidemia    Multivessel coronary artery disease  Significant obstructive coronary artery disease involving the right coronary artery left circumflex artery ramus branch and also diagonal system  Normal LV systolic function  Normal wall motion  Normal left-sided filling pressures  Estimated LV ejection fraction of 55 to 60%    Recommendations:  Aggressive risk factor modification  Cath films reviewed with the patient and also surgical team  Consideration for coronary artery bypass surgery versus two-vessel stenting of the right coronary artery and left circumflex artery  Admit the patient to CVCU bed  Further recommendations based on patient hospital course     Lab Results   Component Value Date    CHOL  12/14/2021      Comment:      <100    TRIG 63 12/14/2021    HDL 35 (A) 12/14/2021    LDL  12/14/2021      Comment:      N/A due to low total cholesterol      Results for orders placed during the hospital  "encounter of 05/26/20    Treadmill Stress Test    Interpretation Summary  · Treadmill stress test post CABG for evaluation for cardiac rehab  · Patient exercised 6 minutes on Alex protocol achieving 7 METS with no ischemic EKG changes no symptoms            Objective:    Vitals:  Vitals:    09/19/23 1010   BP: 174/88   Pulse: 62   SpO2: 97%   Weight: 91.2 kg (201 lb)   Height: 188 cm (74\")       Physical Exam:    General: Alert, cooperative, no distress, appears stated age  Head:  Normocephalic, atraumatic, mucous membranes moist  Eyes:  Conjunctiva/corneas clear, EOM's intact     Neck:  Supple,  no adenopathy;      Lungs: Clear to auscultation bilaterally, no wheezes rhonchi rales are noted  Chest wall: No tenderness  Heart::  Regular rate and rhythm, S1 and S2 normal, no murmur, rub or gallop  Abdomen: Soft, non-tender, nondistended bowel sounds active  Extremities: No cyanosis, clubbing, or edema  Pulses: 2+ and symmetric all extremities  Skin:  No rashes or lesions  Neuro/psych: A&O x3. CN II through XII are grossly intact with appropriate affect      Allergies:  Allergies   Allergen Reactions    Neosporin [Bacitracin-Polymyxin B] Other (See Comments)     Burning sensation        Medication Review:     Current Outpatient Medications:     aspirin 81 MG EC tablet, Take 1 tablet by mouth Daily., Disp: 30 tablet, Rfl: 3    atorvastatin (LIPITOR) 40 MG tablet, Take 1 tablet by mouth every night at bedtime., Disp: , Rfl:     Cholecalciferol (VITAMIN D3) 125 MCG (5000 UT) capsule capsule, Take 1 capsule by mouth Daily., Disp: , Rfl:     metoprolol succinate XL (TOPROL-XL) 25 MG 24 hr tablet, TAKE 1 TABLET BY MOUTH DAILY, Disp: 90 tablet, Rfl: 0    vitamin C (ASCORBIC ACID) 250 MG tablet, Take 1 tablet by mouth Daily., Disp: , Rfl:     Zinc 100 MG tablet, Take  by mouth., Disp: , Rfl:     amLODIPine (NORVASC) 5 MG tablet, Take 1 tablet by mouth Daily., Disp: 90 tablet, Rfl: 3    lisinopril (PRINIVIL,ZESTRIL) 20 MG " tablet, Take 1 tablet by mouth Daily., Disp: 90 tablet, Rfl: 3    Family History:  Family History   Problem Relation Age of Onset    Hypertension Mother     Hypertension Father     Atrial fibrillation Sister     Arrhythmia Sister     Hypertension Sister     Hypertension Brother        Past Medical History:  Past Medical History:   Diagnosis Date    Coronary artery disease     Hyperlipidemia     Hypertension        Past surgical History:  Past Surgical History:   Procedure Laterality Date    CARDIAC CATHETERIZATION Left 05/03/2020    Procedure: LEFT HEART CATH with possible PCI;  Surgeon: Eliezer Hall MD;  Location: [facility] CATH INVASIVE LOCATION;  Service: Cardiovascular;  Laterality: Left;  Local and IV sedation    CORONARY ARTERY BYPASS GRAFT N/A 05/04/2020    Procedure: CORONARY ARTERY BYPASS WITH INTERNAL MAMMARY ARTERY GRAFT;  Surgeon: Yahir Ferris MD;  Location: [facility];  Service: Cardiothoracic;  Laterality: N/A;    FINGER SURGERY      TRANSESOPHAGEAL ECHOCARDIOGRAM (ALICE) N/A 05/04/2020    Procedure: TRANSESOPHAGEAL ECHOCARDIOGRAM WITH ANESTHESIA;  Surgeon: Yahir Ferris MD;  Location: [facility];  Service: Cardiothoracic;  Laterality: N/A;    UVULECTOMY         Social History:  Social History     Socioeconomic History    Marital status:    Tobacco Use    Smoking status: Never    Smokeless tobacco: Never   Vaping Use    Vaping Use: Never used   Substance and Sexual Activity    Alcohol use: Not Currently     Comment: Occasionally    Drug use: Never    Sexual activity: Defer       Review of Systems:  The following systems were reviewed as they relate to the cardiovascular system: Constitutional, Eyes, ENT, Cardiovascular, Respiratory, Gastrointestinal, Integumentary, Neurological, Psychiatric, Hematologic, Endocrine, Musculoskeletal, and Genitourinary. The pertinent cardiovascular findings are reported above with all other cardiovascular points within those systems being  negative.    Diagnostic Study Review:     Current Electrocardiogram:  Procedures      Advance Care Planning   ACP discussion was held with the patient during this visit. Patient has an advance directive in EMR which is still valid.         NOTE: The following portions of the patient's history were reviewed and updated this visit as appropriate: allergies, current medications, past family history, past medical history, past social history, past surgical history and problem list.

## 2023-10-06 RX ORDER — METOPROLOL SUCCINATE 25 MG/1
25 TABLET, EXTENDED RELEASE ORAL DAILY
Qty: 90 TABLET | Refills: 2 | Status: SHIPPED | OUTPATIENT
Start: 2023-10-06

## 2023-10-06 NOTE — TELEPHONE ENCOUNTER
Caller: Adeline Keshawn R    Relationship: Self    Best call back number: [phone]    Requested Prescriptions:   Requested Prescriptions     Pending Prescriptions Disp Refills    metoprolol succinate XL (TOPROL-XL) 25 MG 24 hr tablet 90 tablet 0     Sig: Take 1 tablet by mouth Daily.        Pharmacy where request should be sent: [id number] - [place], [place] - [address] [place] [address] [phone] [facility] [phone]      Last office visit with prescribing clinician: 9/19/2023   Last telemedicine visit with prescribing clinician: Visit date not found   Next office visit with prescribing clinician: 11/3/2023       Does the patient have less than a 3 day supply:  [x] Yes  [] No    Would you like a call back once the refill request has been completed: [x] Yes [] No    If the office needs to give you a call back, can they leave a voicemail: [x] Yes [] No    Monserrat Carmona Rep   10/06/23 13:27 EDT

## 2023-11-29 ENCOUNTER — OFFICE VISIT (OUTPATIENT)
Dept: CARDIOLOGY | Facility: CLINIC | Age: 73
End: 2023-11-29
Payer: COMMERCIAL

## 2023-11-29 VITALS
HEIGHT: 74 IN | SYSTOLIC BLOOD PRESSURE: 136 MMHG | OXYGEN SATURATION: 94 % | WEIGHT: 201 LBS | DIASTOLIC BLOOD PRESSURE: 78 MMHG | BODY MASS INDEX: 25.8 KG/M2 | HEART RATE: 58 BPM

## 2023-11-29 DIAGNOSIS — I10 ESSENTIAL HYPERTENSION: ICD-10-CM

## 2023-11-29 DIAGNOSIS — E78.5 DYSLIPIDEMIA: ICD-10-CM

## 2023-11-29 DIAGNOSIS — I25.118 CORONARY ARTERY DISEASE OF NATIVE ARTERY OF NATIVE HEART WITH STABLE ANGINA PECTORIS: ICD-10-CM

## 2023-11-29 DIAGNOSIS — I25.10 CORONARY ARTERY DISEASE INVOLVING NATIVE CORONARY ARTERY OF NATIVE HEART WITHOUT ANGINA PECTORIS: Primary | ICD-10-CM

## 2023-11-29 PROCEDURE — 99214 OFFICE O/P EST MOD 30 MIN: CPT | Performed by: INTERNAL MEDICINE

## 2023-11-29 RX ORDER — AMLODIPINE BESYLATE 10 MG/1
1 TABLET ORAL DAILY
COMMUNITY
Start: 2023-09-24 | End: 2023-11-29 | Stop reason: ALTCHOICE

## 2023-11-29 RX ORDER — ATORVASTATIN CALCIUM 20 MG/1
1 TABLET, FILM COATED ORAL DAILY
COMMUNITY
Start: 2023-11-03

## 2023-11-29 RX ORDER — LISINOPRIL 40 MG/1
40 TABLET ORAL DAILY
Qty: 90 TABLET | Refills: 3 | Status: SHIPPED | OUTPATIENT
Start: 2023-11-29

## 2023-11-29 RX ORDER — AMLODIPINE BESYLATE 5 MG/1
5 TABLET ORAL DAILY
Qty: 90 TABLET | Refills: 3 | Status: SHIPPED | OUTPATIENT
Start: 2023-11-29

## 2023-11-29 NOTE — PROGRESS NOTES
Cardiology Office Visit      Encounter Date:  11/29/2023    Patient ID:   Keshawn Lr is a 73 y.o. male.    Reason For Followup:  Coronary artery disease  Non-ST elevation myocardial infarction    Brief Clinical History:  Dear  Provider, No Known    I had the pleasure of seeing Keshawn Lr today. As you are well aware, this is a 73 y.o. male recent hospitalization at [facility] for non-ST elevation myocardial infarction diagnosed with severe three-vessel coronary artery disease resulted with coronary artery bypass surgery    Interval History:  Patient is clinically doing better  Denies any symptoms of chest pain shortness of breath dizziness or syncope        Assessment & Plan    Impressions:  Coronary artery disease  Non-ST elevation myocardial infarction  Hypertension  Hyperlipidemia  Multivessel coronary artery disease status post coronary artery bypass surgery  Whitecoat hypertension  Multivessel coronary artery disease  Significant obstructive coronary artery disease involving the right coronary artery left circumflex artery ramus branch and also diagonal system  Normal LV systolic function  Normal wall motion  Normal left-sided filling pressures  Estimated LV ejection fraction of 55 to 60%  Elevated abnormal calcium score  Recommendations:  Continue aspirin statin and beta-blocker  Decrease the dose of lisinopril to 5 mg p.o. once a day secondary to low blood pressures at home  Continue current dose of beta-blocker  Home blood pressure monitoring  Patient is advised to bring the blood pressure cuff and verify the possible whitecoat syndrome  Home blood pressure monitoring  Lipids are optimal  Continue current medical management  Continue close monitoring  Check labs including CBC CMP lipids  Continue current medical therapy with aspirin 81 mg p.o. once a day Lipitor 40 mg p.o. once a dayToprol-XL 25 mg p.o. once a day Crestor 10 mg p.o. once a day  Increase the dose of lisinopril to 40 mg p.o. once  "a day  Increase the dose of Norvasc to 5 mg p.o. once a day  Continue home blood pressure monitoring  Prior work-up reviewed and discussed with patient  Check labs including CBC CMP lipids and thyroid function  Recent labs and work-up reviewed and discussed with patient  Follow-up in office in 6 months          Vitals:  Vitals:    11/29/23 0823   BP: 136/78   BP Location: Left arm   Patient Position: Sitting   Pulse: 58   SpO2: 94%   Weight: 91.2 kg (201 lb)   Height: 188 cm (74\")       Physical Exam:    General: Alert, cooperative, no distress, appears stated age  Head:  Normocephalic, atraumatic, mucous membranes moist  Eyes:  Conjunctiva/corneas clear, EOM's intact     Neck:  Supple,  no adenopathy;      Lungs: Clear to auscultation bilaterally, no wheezes rhonchi rales are noted  Chest wall: No tenderness  Heart::  Regular rate and rhythm, S1 and S2 normal, no murmur, rub or gallop  Abdomen: Soft, non-tender, nondistended bowel sounds active  Extremities: No cyanosis, clubbing, or edema  Pulses: 2+ and symmetric all extremities  Skin:  No rashes or lesions  Neuro/psych: A&O x3. CN II through XII are grossly intact with appropriate affect              Lab Results   Component Value Date    GLUCOSE 106 (H) 07/08/2023    BUN 28 (H) 07/08/2023    CREATININE 1.21 07/08/2023    EGFR 64.0 07/08/2023    BCR 23.1 07/08/2023    K 4.4 07/08/2023    CO2 24.0 07/08/2023    CALCIUM 9.0 07/08/2023    ALBUMIN 4.20 05/05/2020    BILITOT 0.7 05/03/2020    AST 35 05/03/2020    ALT 31 05/03/2020     Results for orders placed in visit on 05/04/20    Emergent/Open-Heart Anesthesia ALICE    Narrative  Procedure Performed: Emergent/Open-Heart Anesthesia ALICE  Start Time:  End Time:    Preanesthesia Checklist:  Patient identified, IV assessed, risks and benefits discussed, monitors and equipment assessed, procedure being performed at surgeon's request and anesthesia consent obtained.    General Procedure Information  Diagnostic Indications " for Echo:  assessment of surgical repair and hemodynamic monitoring  Location performed:  OR  Intubated  Bite block placed  Heart visualized  Probe Insertion:  Easy  Probe Type:  Biplane and multiplane  Modalities:  Color flow mapping, pulse wave Doppler and continuous wave Doppler    Echocardiographic and Doppler Measurements    Ventricles    Right Ventricle:  Cavity size normal.  Hypertrophy not present.  Thrombus not present.  Global function normal.  Ejection Fraction 60%.  Left Ventricle:  Cavity size normal.  Hypertrophy present.  Thrombus not present.  Global Function normal.  Ejection Fraction 60%.    Ventricular Regional Function:  1- Basal Anteroseptal:  normal  2- Basal Anterior:  normal  3- Basal Anterolateral:  normal  4- Basal Inferolateral:  normal  5- Basal Inferior:  normal  6- Basal Inferoseptal:  normal  7- Mid Anteroseptal:  normal  8- Mid Anterior:  normal  9- Mid Anterolateral:  normal  10- Mid Inferolateral:  normal  11- Mid Inferior:  normal  12- Mid Inferoseptal:  normal  13- Apical Anterior:  normal  14- Apical Lateral:  normal  15- Apical Inferior:  normal  16- Apical Septal:  normal  17- [place]:  normal      Valves    Aortic Valve:  Annulus normal.  Stenosis not present.  Regurgitation trace.  Leaflets normal.  Leaflet motions normal.    Mitral Valve:  Annulus normal.  Stenosis not present.  Regurgitation trace.  Leaflets normal.  Leaflet motions normal.    Tricuspid Valve:  Annulus normal.  Stenosis not present.  Regurgitation absent.  Leaflets normal.  Leaflet motions normal.  Pulmonic Valve:  Annulus normal.  Stenosis not present.  Regurgitation absent.        Aorta    Ascending Aorta:  Size normal.  Dissection not present.  Plaque thickness less than 3 mm.  Mobile plaque not present.  Aortic Arch:  Size normal.  Dissection not present.  Plaque thickness less than 3 mm.  Mobile plaque not present.  Descending Aorta:  Size normal.  Dissection not present.  Plaque thickness less than 3 mm.   Mobile plaque not present.        Atria    Right Atrium:  Size normal.  Left Atrium:  Size normal.  Spontaneous echo contrast not present.  Thrombus not present.  Tumor not present.  Device not present.  Left atrial appendage normal.      Septa    Atrial Septum:  Intra-atrial septal morphology normal.    Ventricular Septum:  Intra-ventricular septum morphology normal.        Other Findings  Pleural Effusion:  none  Pulmonary Arteries:  normal      Anesthesia Information  Performed Personally  Anesthesiologist:  Vega Pierson MD      Echocardiogram Comments:  Post cpb no change     Results for orders placed during the hospital encounter of 05/02/20    Cardiac Catheterization/Vascular Study    Narrative  Table formatting from the original result was not included.  Cardiac Catheterization Operative Report    Keshawn Lr  [id number]  5/3/2020  @PCP@    He underwent cardiac catheterization.    Indications for the procedure include: acute coronary syndrome.    Indication  Non-ST elevation myocardial infarction  Chest discomfort  Hypertension  Hyperlipidemia    Procedures  Selective left and right coronary angiogram  Left heart catheterization  Left ventriculogram    Procedure Details:  The risks, benefits, complications, treatment options, and expected outcomes were discussed with the patient. The patient and/or family concurred with the proposed plan, giving informed consent.    After informed consent the patient was brought to the cath lab after appropriate IV hydration was begun and oral premedication was given. He was further sedated with fentanyl. He was prepped and draped in the usual manner. Using the modified Seldinger access technique, a 6 [place] sheath was placed in the femoral artery. A left heart catheterization with coronary arteriography was performed. Findings are discussed below.    After the procedure was completed, sedation was stopped and the sheaths and catheters were all removed. Hemostasis was  achieved per established hospital protocols.    Findings:    Hemodynamics  Central aortic pressure systolic of 111 diastolic 71 with a mean pressure of 88  LV end-diastolic pressure was 10 mmHg  Left Main  angiographically normal  RCA  codominant vessel  Mid long segment angiographic 70 to 80% stenosis  Distal focal area of 99% stenosis  LAD  mid 20 to 30% stenosis  Still focal area of 30% stenosis  First diagonal branch is a small to moderate sized vessel bifurcates into 2 branches both of them have a 99% stenosis  Second diagonal branch is a small sized vessel has ostial 90% stenosis  Circ  mid 95% stenosis  Bifurcates into 2 marginal branches  Lower marginal branch is proximal 50% stenosis  Moderate sized ramus branch with ostial 30 to 40% stenosis in the proximal 90% stenosis  LV Normal LV systolic function with normal wall motion with estimated LV ejection fraction of 55 to 60%  Left ventricular end-diastolic pressure was 10 mmHg  Coronary dominance  codominant system    Estimated Blood Loss:  Minimal    Complications:  None; patient tolerated the procedure well.    Disposition: PACU - hemodynamically stable.    Condition: stable    Impressions:  Chest discomfort  Non-ST elevation myocardial infarction  Hypertension  Hyperlipidemia    Multivessel coronary artery disease  Significant obstructive coronary artery disease involving the right coronary artery left circumflex artery ramus branch and also diagonal system  Normal LV systolic function  Normal wall motion  Normal left-sided filling pressures  Estimated LV ejection fraction of 55 to 60%    Recommendations:  Aggressive risk factor modification  Cath films reviewed with the patient and also surgical team  Consideration for coronary artery bypass surgery versus two-vessel stenting of the right coronary artery and left circumflex artery  Admit the patient to CVCU bed  Further recommendations based on patient hospital course     Lab Results   Component Value  Date    CHOL  12/14/2021      Comment:      <100    TRIG 63 12/14/2021    HDL 35 (A) 12/14/2021    LDL  12/14/2021      Comment:      N/A due to low total cholesterol      Results for orders placed during the hospital encounter of 05/26/20    Treadmill Stress Test    Interpretation Summary  · Treadmill stress test post CABG for evaluation for cardiac rehab  · Patient exercised 6 minutes on Alex protocol achieving 7 METS with no ischemic EKG changes no symptoms            Objective:          Allergies:  Allergies   Allergen Reactions    Neosporin [Bacitracin-Polymyxin B] Other (See Comments)     Burning sensation        Medication Review:     Current Outpatient Medications:     aspirin 81 MG EC tablet, Take 1 tablet by mouth Daily., Disp: 30 tablet, Rfl: 3    atorvastatin (LIPITOR) 20 MG tablet, Take 1 tablet by mouth Daily., Disp: , Rfl:     Cholecalciferol (VITAMIN D3) 125 MCG (5000 UT) capsule capsule, Take 1 capsule by mouth Daily., Disp: , Rfl:     metoprolol succinate XL (TOPROL-XL) 25 MG 24 hr tablet, Take 1 tablet by mouth Daily., Disp: 90 tablet, Rfl: 2    vitamin C (ASCORBIC ACID) 250 MG tablet, Take 1 tablet by mouth Daily., Disp: , Rfl:     Zinc 100 MG tablet, Take  by mouth., Disp: , Rfl:     amLODIPine (NORVASC) 5 MG tablet, Take 1 tablet by mouth Daily., Disp: 90 tablet, Rfl: 3    lisinopril (PRINIVIL,ZESTRIL) 40 MG tablet, Take 1 tablet by mouth Daily., Disp: 90 tablet, Rfl: 3    Family History:  Family History   Problem Relation Age of Onset    Hypertension Mother     Hypertension Father     Atrial fibrillation Sister     Arrhythmia Sister     Hypertension Sister     Hypertension Brother        Past Medical History:  Past Medical History:   Diagnosis Date    Coronary artery disease     Hyperlipidemia     Hypertension        Past surgical History:  Past Surgical History:   Procedure Laterality Date    CARDIAC CATHETERIZATION Left 05/03/2020    Procedure: LEFT HEART CATH with possible PCI;  Surgeon:  Eliezer Hall MD;  Location: [facility] CATH INVASIVE LOCATION;  Service: Cardiovascular;  Laterality: Left;  Local and IV sedation    CORONARY ARTERY BYPASS GRAFT N/A 05/04/2020    Procedure: CORONARY ARTERY BYPASS WITH INTERNAL MAMMARY ARTERY GRAFT;  Surgeon: Yahir Ferris MD;  Location: [facility] CVOR;  Service: Cardiothoracic;  Laterality: N/A;    FINGER SURGERY      TRANSESOPHAGEAL ECHOCARDIOGRAM (ALICE) N/A 05/04/2020    Procedure: TRANSESOPHAGEAL ECHOCARDIOGRAM WITH ANESTHESIA;  Surgeon: Yahir Ferris MD;  Location: [facility];  Service: Cardiothoracic;  Laterality: N/A;    UVULECTOMY         Social History:  Social History     Socioeconomic History    Marital status:    Tobacco Use    Smoking status: Never    Smokeless tobacco: Never   Vaping Use    Vaping Use: Never used   Substance and Sexual Activity    Alcohol use: Not Currently     Comment: Occasionally    Drug use: Never    Sexual activity: Defer       Review of Systems:  The following systems were reviewed as they relate to the cardiovascular system: Constitutional, Eyes, ENT, Cardiovascular, Respiratory, Gastrointestinal, Integumentary, Neurological, Psychiatric, Hematologic, Endocrine, Musculoskeletal, and Genitourinary. The pertinent cardiovascular findings are reported above with all other cardiovascular points within those systems being negative.    Diagnostic Study Review:     Current Electrocardiogram:  Procedures          NOTE: The following portions of the patient's history were reviewed and updated this visit as appropriate: allergies, current medications, past family history, past medical history, past social history, past surgical history and problem list.

## 2024-06-26 ENCOUNTER — OFFICE VISIT (OUTPATIENT)
Dept: CARDIOLOGY | Facility: CLINIC | Age: 74
End: 2024-06-26
Payer: COMMERCIAL

## 2024-06-26 VITALS
HEIGHT: 74 IN | OXYGEN SATURATION: 93 % | BODY MASS INDEX: 26.18 KG/M2 | WEIGHT: 204 LBS | HEART RATE: 54 BPM | DIASTOLIC BLOOD PRESSURE: 78 MMHG | SYSTOLIC BLOOD PRESSURE: 164 MMHG

## 2024-06-26 DIAGNOSIS — I25.118 CORONARY ARTERY DISEASE OF NATIVE ARTERY OF NATIVE HEART WITH STABLE ANGINA PECTORIS: ICD-10-CM

## 2024-06-26 DIAGNOSIS — E78.5 HYPERLIPIDEMIA LDL GOAL <100: ICD-10-CM

## 2024-06-26 DIAGNOSIS — I10 ESSENTIAL HYPERTENSION: ICD-10-CM

## 2024-06-26 DIAGNOSIS — I25.10 CORONARY ARTERY DISEASE INVOLVING NATIVE CORONARY ARTERY OF NATIVE HEART WITHOUT ANGINA PECTORIS: Primary | ICD-10-CM

## 2024-06-26 DIAGNOSIS — E78.5 DYSLIPIDEMIA: ICD-10-CM

## 2024-06-26 PROCEDURE — 99214 OFFICE O/P EST MOD 30 MIN: CPT | Performed by: INTERNAL MEDICINE

## 2024-06-26 PROCEDURE — 93000 ELECTROCARDIOGRAM COMPLETE: CPT | Performed by: INTERNAL MEDICINE

## 2024-06-26 NOTE — PROGRESS NOTES
Cardiology Office Visit      Encounter Date:  06/26/2024    Patient ID:   Keshawn Lr is a 74 y.o. male.    Reason For Followup:  Coronary artery disease      Brief Clinical History:  Dear  Provider, No Known    I had the pleasure of seeing Keshawn Lr today. As you are well aware, this is a 74 y.o. male recent hospitalization at [facility] for non-ST elevation myocardial infarction diagnosed with severe three-vessel coronary artery disease resulted with coronary artery bypass surgery    Interval History:  Patient is clinically doing better  Denies any symptoms of chest pain shortness of breath dizziness or syncope  Patient is complaining of some symptoms of muscle cramps muscle pain especially in the setting of working outside  Home blood pressure readings are optimal  Denies any exertional cardiac symptoms      Assessment & Plan    Impressions:  Coronary artery disease  Non-ST elevation myocardial infarction  Hypertension  Hyperlipidemia  Multivessel coronary artery disease status post coronary artery bypass surgery  Whitecoat hypertension  Multivessel coronary artery disease  Significant obstructive coronary artery disease involving the right coronary artery left circumflex artery ramus branch and also diagonal system  Normal LV systolic function  Normal wall motion  Normal left-sided filling pressures  Estimated LV ejection fraction of 55 to 60%  Elevated abnormal calcium score  Home blood pressure readings in the last several months reviewed and discussed with patient that optimal    Recommendations:  Continue aspirin statin and beta-blocker  Decrease the dose of lisinopril to 5 mg p.o. once a day secondary to low blood pressures at home  Continue current dose of beta-blocker  Home blood pressure monitoring  Patient is advised to bring the blood pressure cuff and verify the possible whitecoat syndrome  Home blood pressure monitoring  Lipids are optimal  Continue current medical management  Continue  "close monitoring  Continue current medical therapy with aspirin 81 mg p.o. once a day Lipitor 40 mg p.o. once a dayToprol-XL 25 mg p.o. once a day Crestor 10 mg p.o. once a day lisinopril 40 mg p.o. once a day Norvasc 5 mg p.o. once a day  Continue home blood pressure monitoring  Prior work-up reviewed and discussed with patient  Check labs including CBC CMP lipids thyroid profile CPK vitamin D  Follow-up in office in 6 months        Vitals:  Vitals:    06/26/24 0915   BP: 164/78   Pulse: 54   SpO2: 93%   Weight: 92.5 kg (204 lb)   Height: 188 cm (74\")       Physical Exam:    General: Alert, cooperative, no distress, appears stated age  Head:  Normocephalic, atraumatic, mucous membranes moist  Eyes:  Conjunctiva/corneas clear, EOM's intact     Neck:  Supple,  no adenopathy;      Lungs: Clear to auscultation bilaterally, no wheezes rhonchi rales are noted  Chest wall: No tenderness  Heart::  Regular rate and rhythm, S1 and S2 normal, no murmur, rub or gallop  Abdomen: Soft, non-tender, nondistended bowel sounds active  Extremities: No cyanosis, clubbing, or edema  Pulses: 2+ and symmetric all extremities  Skin:  No rashes or lesions  Neuro/psych: A&O x3. CN II through XII are grossly intact with appropriate affect              Lab Results   Component Value Date    GLUCOSE 106 (H) 07/08/2023    BUN 28 (H) 07/08/2023    CREATININE 1.21 07/08/2023    EGFR 64.0 07/08/2023    BCR 23.1 07/08/2023    K 4.4 07/08/2023    CO2 24.0 07/08/2023    CALCIUM 9.0 07/08/2023    ALBUMIN 4.20 05/05/2020    BILITOT 0.7 05/03/2020    AST 35 05/03/2020    ALT 31 05/03/2020     Results for orders placed in visit on 05/04/20    Emergent/Open-Heart Anesthesia ALICE    Narrative  Procedure Performed: Emergent/Open-Heart Anesthesia ALICE  Start Time:  End Time:    Preanesthesia Checklist:  Patient identified, IV assessed, risks and benefits discussed, monitors and equipment assessed, procedure being performed at surgeon's request and anesthesia " consent obtained.    General Procedure Information  Diagnostic Indications for Echo:  assessment of surgical repair and hemodynamic monitoring  Location performed:  OR  Intubated  Bite block placed  Heart visualized  Probe Insertion:  Easy  Probe Type:  Biplane and multiplane  Modalities:  Color flow mapping, pulse wave Doppler and continuous wave Doppler    Echocardiographic and Doppler Measurements    Ventricles    Right Ventricle:  Cavity size normal.  Hypertrophy not present.  Thrombus not present.  Global function normal.  Ejection Fraction 60%.  Left Ventricle:  Cavity size normal.  Hypertrophy present.  Thrombus not present.  Global Function normal.  Ejection Fraction 60%.    Ventricular Regional Function:  1- Basal Anteroseptal:  normal  2- Basal Anterior:  normal  3- Basal Anterolateral:  normal  4- Basal Inferolateral:  normal  5- Basal Inferior:  normal  6- Basal Inferoseptal:  normal  7- Mid Anteroseptal:  normal  8- Mid Anterior:  normal  9- Mid Anterolateral:  normal  10- Mid Inferolateral:  normal  11- Mid Inferior:  normal  12- Mid Inferoseptal:  normal  13- Apical Anterior:  normal  14- Apical Lateral:  normal  15- Apical Inferior:  normal  16- Apical Septal:  normal  17- [place]:  normal      Valves    Aortic Valve:  Annulus normal.  Stenosis not present.  Regurgitation trace.  Leaflets normal.  Leaflet motions normal.    Mitral Valve:  Annulus normal.  Stenosis not present.  Regurgitation trace.  Leaflets normal.  Leaflet motions normal.    Tricuspid Valve:  Annulus normal.  Stenosis not present.  Regurgitation absent.  Leaflets normal.  Leaflet motions normal.  Pulmonic Valve:  Annulus normal.  Stenosis not present.  Regurgitation absent.        Aorta    Ascending Aorta:  Size normal.  Dissection not present.  Plaque thickness less than 3 mm.  Mobile plaque not present.  Aortic Arch:  Size normal.  Dissection not present.  Plaque thickness less than 3 mm.  Mobile plaque not present.  Descending  Aorta:  Size normal.  Dissection not present.  Plaque thickness less than 3 mm.  Mobile plaque not present.        Atria    Right Atrium:  Size normal.  Left Atrium:  Size normal.  Spontaneous echo contrast not present.  Thrombus not present.  Tumor not present.  Device not present.  Left atrial appendage normal.      Septa    Atrial Septum:  Intra-atrial septal morphology normal.    Ventricular Septum:  Intra-ventricular septum morphology normal.        Other Findings  Pleural Effusion:  none  Pulmonary Arteries:  normal      Anesthesia Information  Performed Personally  Anesthesiologist:  Vega Pierson MD      Echocardiogram Comments:  Post cpb no change     Results for orders placed during the hospital encounter of 05/02/20    Cardiac Catheterization/Vascular Study    Narrative  Table formatting from the original result was not included.  Cardiac Catheterization Operative Report    Keshawn Lr  [id number]  5/3/2020  @PCP@    He underwent cardiac catheterization.    Indications for the procedure include: acute coronary syndrome.    Indication  Non-ST elevation myocardial infarction  Chest discomfort  Hypertension  Hyperlipidemia    Procedures  Selective left and right coronary angiogram  Left heart catheterization  Left ventriculogram    Procedure Details:  The risks, benefits, complications, treatment options, and expected outcomes were discussed with the patient. The patient and/or family concurred with the proposed plan, giving informed consent.    After informed consent the patient was brought to the cath lab after appropriate IV hydration was begun and oral premedication was given. He was further sedated with fentanyl. He was prepped and draped in the usual manner. Using the modified Seldinger access technique, a 6 Serbian sheath was placed in the femoral artery. A left heart catheterization with coronary arteriography was performed. Findings are discussed below.    After the procedure was completed,  sedation was stopped and the sheaths and catheters were all removed. Hemostasis was achieved per established hospital protocols.    Findings:    Hemodynamics  Central aortic pressure systolic of 111 diastolic 71 with a mean pressure of 88  LV end-diastolic pressure was 10 mmHg  Left Main  angiographically normal  RCA  codominant vessel  Mid long segment angiographic 70 to 80% stenosis  Distal focal area of 99% stenosis  LAD  mid 20 to 30% stenosis  Still focal area of 30% stenosis  First diagonal branch is a small to moderate sized vessel bifurcates into 2 branches both of them have a 99% stenosis  Second diagonal branch is a small sized vessel has ostial 90% stenosis  Circ  mid 95% stenosis  Bifurcates into 2 marginal branches  Lower marginal branch is proximal 50% stenosis  Moderate sized ramus branch with ostial 30 to 40% stenosis in the proximal 90% stenosis  LV Normal LV systolic function with normal wall motion with estimated LV ejection fraction of 55 to 60%  Left ventricular end-diastolic pressure was 10 mmHg  Coronary dominance  codominant system    Estimated Blood Loss:  Minimal    Complications:  None; patient tolerated the procedure well.    Disposition: PACU - hemodynamically stable.    Condition: stable    Impressions:  Chest discomfort  Non-ST elevation myocardial infarction  Hypertension  Hyperlipidemia    Multivessel coronary artery disease  Significant obstructive coronary artery disease involving the right coronary artery left circumflex artery ramus branch and also diagonal system  Normal LV systolic function  Normal wall motion  Normal left-sided filling pressures  Estimated LV ejection fraction of 55 to 60%    Recommendations:  Aggressive risk factor modification  Cath films reviewed with the patient and also surgical team  Consideration for coronary artery bypass surgery versus two-vessel stenting of the right coronary artery and left circumflex artery  Admit the patient to CVCU bed  Further  recommendations based on patient hospital course     Lab Results   Component Value Date    CHOL  12/14/2021      Comment:      <100    TRIG 63 12/14/2021    HDL 35 (A) 12/14/2021    LDL  12/14/2021      Comment:      N/A due to low total cholesterol      Results for orders placed during the hospital encounter of 05/26/20    Treadmill Stress Test    Interpretation Summary  · Treadmill stress test post CABG for evaluation for cardiac rehab  · Patient exercised 6 minutes on Alex protocol achieving 7 METS with no ischemic EKG changes no symptoms            Objective:          Allergies:  Allergies   Allergen Reactions    Neosporin [Bacitracin-Polymyxin B] Other (See Comments)     Burning sensation        Medication Review:     Current Outpatient Medications:     amLODIPine (NORVASC) 5 MG tablet, Take 1 tablet by mouth Daily., Disp: 90 tablet, Rfl: 3    aspirin 81 MG EC tablet, Take 1 tablet by mouth Daily., Disp: 30 tablet, Rfl: 3    atorvastatin (LIPITOR) 20 MG tablet, Take 1 tablet by mouth Daily., Disp: , Rfl:     Cholecalciferol (VITAMIN D3) 125 MCG (5000 UT) capsule capsule, Take 1 capsule by mouth Daily., Disp: , Rfl:     lisinopril (PRINIVIL,ZESTRIL) 40 MG tablet, Take 1 tablet by mouth Daily., Disp: 90 tablet, Rfl: 3    metoprolol succinate XL (TOPROL-XL) 25 MG 24 hr tablet, Take 1 tablet by mouth Daily., Disp: 90 tablet, Rfl: 2    vitamin C (ASCORBIC ACID) 250 MG tablet, Take 1 tablet by mouth Daily., Disp: , Rfl:     Zinc 100 MG tablet, Take  by mouth., Disp: , Rfl:     Family History:  Family History   Problem Relation Age of Onset    Hypertension Mother     Hypertension Father     Atrial fibrillation Sister     Arrhythmia Sister     Hypertension Sister     Hypertension Brother        Past Medical History:  Past Medical History:   Diagnosis Date    Coronary artery disease     Hyperlipidemia     Hypertension        Past surgical History:  Past Surgical History:   Procedure Laterality Date    CARDIAC  CATHETERIZATION Left 05/03/2020    Procedure: LEFT HEART CATH with possible PCI;  Surgeon: Eliezer Hall MD;  Location: [facility] CATH INVASIVE LOCATION;  Service: Cardiovascular;  Laterality: Left;  Local and IV sedation    CORONARY ARTERY BYPASS GRAFT N/A 05/04/2020    Procedure: CORONARY ARTERY BYPASS WITH INTERNAL MAMMARY ARTERY GRAFT;  Surgeon: Yahir Ferris MD;  Location: [facility];  Service: Cardiothoracic;  Laterality: N/A;    FINGER SURGERY      TRANSESOPHAGEAL ECHOCARDIOGRAM (ALICE) N/A 05/04/2020    Procedure: TRANSESOPHAGEAL ECHOCARDIOGRAM WITH ANESTHESIA;  Surgeon: Yahir Ferris MD;  Location: [facility];  Service: Cardiothoracic;  Laterality: N/A;    UVULECTOMY         Social History:  Social History     Socioeconomic History    Marital status:    Tobacco Use    Smoking status: Never    Smokeless tobacco: Never   Vaping Use    Vaping status: Never Used   Substance and Sexual Activity    Alcohol use: Not Currently     Comment: Occasionally    Drug use: Never    Sexual activity: Defer       Review of Systems:  The following systems were reviewed as they relate to the cardiovascular system: Constitutional, Eyes, ENT, Cardiovascular, Respiratory, Gastrointestinal, Integumentary, Neurological, Psychiatric, Hematologic, Endocrine, Musculoskeletal, and Genitourinary. The pertinent cardiovascular findings are reported above with all other cardiovascular points within those systems being negative.    Diagnostic Study Review:     Current Electrocardiogram:    ECG 12 Lead    Date/Time: 6/26/2024 9:27 AM  Performed by: Eliezer Hall MD    Authorized by: Eliezer Hall MD  Comparison: compared with previous ECG   Similar to previous ECG  Rhythm: sinus rhythm and sinus bradycardia  Rate: normal  BPM: 52  Conduction: conduction normal  ST Segments: ST segments normal  T Waves: T waves normal  QRS axis: normal    Clinical impression: normal ECG                NOTE: The  following portions of the patient's history were reviewed and updated this visit as appropriate: allergies, current medications, past family history, past medical history, past social history, past surgical history and problem list.

## 2024-06-28 RX ORDER — METOPROLOL SUCCINATE 25 MG/1
25 TABLET, EXTENDED RELEASE ORAL DAILY
Qty: 90 TABLET | Refills: 0 | Status: SHIPPED | OUTPATIENT
Start: 2024-06-28

## 2024-09-24 RX ORDER — METOPROLOL SUCCINATE 25 MG/1
25 TABLET, EXTENDED RELEASE ORAL DAILY
Qty: 90 TABLET | Refills: 1 | Status: SHIPPED | OUTPATIENT
Start: 2024-09-24

## 2024-10-29 ENCOUNTER — TELEPHONE (OUTPATIENT)
Dept: CARDIOLOGY | Facility: CLINIC | Age: 74
End: 2024-10-29
Payer: COMMERCIAL

## 2024-10-29 NOTE — TELEPHONE ENCOUNTER
Caller: KORTNEY WARNER    Relationship to patient: Emergency Contact    Best call back number: [phone]    Chief complaint: HEART CONDITION    Type of visit: FOLLOW UP    Requested date: ASAP     If rescheduling, when is the original appointment: 1/8/25     Additional notes: PT'S WIFE IS CALLING TO SEE IF THE PT NEEDS AN APPT TO DISCUSS HEART CONDITION. PLEASE CALL BACK

## 2024-11-01 ENCOUNTER — TELEPHONE (OUTPATIENT)
Dept: CARDIOLOGY | Facility: CLINIC | Age: 74
End: 2024-11-01
Payer: COMMERCIAL

## 2024-11-01 DIAGNOSIS — E78.5 DYSLIPIDEMIA: ICD-10-CM

## 2024-11-01 DIAGNOSIS — I25.118 CORONARY ARTERY DISEASE OF NATIVE ARTERY OF NATIVE HEART WITH STABLE ANGINA PECTORIS: ICD-10-CM

## 2024-11-01 DIAGNOSIS — I25.10 CORONARY ARTERY DISEASE INVOLVING NATIVE CORONARY ARTERY OF NATIVE HEART WITHOUT ANGINA PECTORIS: ICD-10-CM

## 2024-11-01 DIAGNOSIS — I10 ESSENTIAL HYPERTENSION: ICD-10-CM

## 2024-11-01 DIAGNOSIS — Z95.1 S/P CABG X 4: ICD-10-CM

## 2024-11-01 DIAGNOSIS — R53.83 OTHER FATIGUE: ICD-10-CM

## 2024-11-01 DIAGNOSIS — E78.5 HYPERLIPIDEMIA LDL GOAL <100: Primary | ICD-10-CM

## 2024-11-01 NOTE — TELEPHONE ENCOUNTER
Called and LM with the patient, and the patient's wife. About the lab orders, and to call back if any further questions and or concerns.    ----- Message from Eliezer Hall sent at 11/1/2024 11:02 AM EDT -----  I ordered some labs today and he can have it done before next office visit  ----- Message -----  From: Kiersten Arambula MA  Sent: 11/1/2024  10:34 AM EDT  To: Eliezer Hall MD    Patient's wife is calling to see. If you are wanting the patient to have labs done. Before his appt with you in January? Please advise.

## 2024-11-01 NOTE — TELEPHONE ENCOUNTER
Called and LM for the patient to call the office back.       Caller: KORTNEY WARNER     Relationship to patient: Emergency Contact     Best call back number: [phone]     Chief complaint: HEART CONDITION     Type of visit: FOLLOW UP     Requested date: ASAP      If rescheduling, when is the original appointment: 1/8/25      Additional notes: PT'S WIFE IS CALLING TO SEE IF THE PT NEEDS AN APPT TO DISCUSS HEART CONDITION. PLEASE CALL BACK

## 2024-11-01 NOTE — TELEPHONE ENCOUNTER
Patient called the office back. Stating that the patient's PCP told them that they needed to call his Cardiologist. Due to the PCP seeing a note in the patient's chart. Stating that he was scheduled for a heart Cath. I looked through the patient's chart. I did not see anything. I told the patient's wife that per Dr. German's last note. The patient is to just follow up with him in 6 months. Patient 's wife acknowledged this information.

## 2025-01-08 ENCOUNTER — OFFICE VISIT (OUTPATIENT)
Dept: CARDIOLOGY | Facility: CLINIC | Age: 75
End: 2025-01-08
Payer: COMMERCIAL

## 2025-01-08 VITALS
DIASTOLIC BLOOD PRESSURE: 93 MMHG | SYSTOLIC BLOOD PRESSURE: 168 MMHG | OXYGEN SATURATION: 97 % | BODY MASS INDEX: 25.9 KG/M2 | HEIGHT: 74 IN | HEART RATE: 55 BPM | WEIGHT: 201.8 LBS

## 2025-01-08 DIAGNOSIS — I10 ESSENTIAL HYPERTENSION: ICD-10-CM

## 2025-01-08 DIAGNOSIS — E78.5 HYPERLIPIDEMIA LDL GOAL <100: ICD-10-CM

## 2025-01-08 DIAGNOSIS — I21.4 SUBENDOCARDIAL MI FIRST EPISODE CARE: ICD-10-CM

## 2025-01-08 DIAGNOSIS — I25.118 CORONARY ARTERY DISEASE OF NATIVE ARTERY OF NATIVE HEART WITH STABLE ANGINA PECTORIS: ICD-10-CM

## 2025-01-08 DIAGNOSIS — I25.110 CORONARY ARTERY DISEASE INVOLVING NATIVE CORONARY ARTERY OF NATIVE HEART WITH UNSTABLE ANGINA PECTORIS: ICD-10-CM

## 2025-01-08 DIAGNOSIS — Z95.1 S/P CABG X 4: ICD-10-CM

## 2025-01-08 DIAGNOSIS — E78.5 DYSLIPIDEMIA: ICD-10-CM

## 2025-01-08 DIAGNOSIS — I25.10 CORONARY ARTERY DISEASE INVOLVING NATIVE CORONARY ARTERY OF NATIVE HEART WITHOUT ANGINA PECTORIS: Primary | ICD-10-CM

## 2025-01-08 DIAGNOSIS — R53.83 OTHER FATIGUE: ICD-10-CM

## 2025-01-08 PROCEDURE — 99214 OFFICE O/P EST MOD 30 MIN: CPT | Performed by: INTERNAL MEDICINE

## 2025-01-08 PROCEDURE — 93000 ELECTROCARDIOGRAM COMPLETE: CPT | Performed by: INTERNAL MEDICINE

## 2025-01-08 NOTE — PROGRESS NOTES
Cardiology Office Visit      Encounter Date:  01/08/2025    Patient ID:   Keshawn Lr is a 74 y.o. male.    Reason For Followup:  Coronary artery disease      Brief Clinical History:  Dear  Provider, No Known    I had the pleasure of seeing Keshawn Lr today. As you are well aware, this is a 74 y.o. male recent hospitalization at [facility] for non-ST elevation myocardial infarction diagnosed with severe three-vessel coronary artery disease resulted with coronary artery bypass surgery.  Patient is here for follow-up    Interval History:  Patient is clinically doing better  Denies any symptoms of chest pain shortness of breath dizziness or syncope  Patient is complaining of some symptoms of muscle cramps muscle pain especially in the setting of working outside  Home blood pressure readings are optimal  Denies any exertional cardiac symptoms  Denies any exertional cardiac symptoms  Nonspecific nonexertional chest wall pain that is atypical for angina  Likely pain is musculoskeletal  Compliant with medical therapy  Good functional capacity  Assessment & Plan    Impressions:  Coronary artery disease  Non-ST elevation myocardial infarction  Hypertension  Hyperlipidemia  Multivessel coronary artery disease status post coronary artery bypass surgery  [place] hypertension  Multivessel coronary artery disease  Significant obstructive coronary artery disease involving the right coronary artery left circumflex artery ramus branch and also diagonal system  Normal LV systolic function  Normal wall motion  Normal left-sided filling pressures  Estimated LV ejection fraction of 55 to 60%  Elevated abnormal calcium score  Home blood pressure readings in the last several months reviewed and discussed with patient that optimal  [place] hypertension    Recommendations:  Continue aspirin statin and beta-blocker  Home blood pressure readings reviewed and they are optimal  Continue current dose of beta-blocker  Home blood  "pressure monitoring  Patient is advised to bring the blood pressure cuff and verify the possible whitecoat syndrome  Home blood pressure monitoring  Lipids are optimal  Continue current medical management  Continue close monitoring  Continue current medical therapy with aspirin 81 mg p.o. once a day Lipitor 40 mg p.o. once a dayToprol-XL 25 mg p.o. once a day Crestor 10 mg p.o. once a day lisinopril 40 mg p.o. once a day Norvasc 5 mg p.o. once a day  Continue home blood pressure monitoring  Prior work-up reviewed and discussed with patient  Check labs including CBC CMP lipids thyroid profile  vitamin D  Prior workup reviewed and discussed patient  Functional capacity  Labs and medications reviewed and discussed with patient  Home blood pressure readings were reviewed and discussed with patient  Follow-up in office in 6 months        Vitals:  Vitals:    01/08/25 1003   BP: 168/93   Pulse: 55   SpO2: 97%   Weight: 91.5 kg (201 lb 12.8 oz)   Height: 188 cm (74\")       Physical Exam:    General: Alert, cooperative, no distress, appears stated age  Head:  Normocephalic, atraumatic, mucous membranes moist  Eyes:  Conjunctiva/corneas clear, EOM's intact     Neck:  Supple,  no adenopathy;      Lungs: Clear to auscultation bilaterally, no wheezes rhonchi rales are noted  Chest wall: No tenderness  Heart::  Regular rate and rhythm, S1 and S2 normal, no murmur, rub or gallop  Abdomen: Soft, non-tender, nondistended bowel sounds active  Extremities: No cyanosis, clubbing, or edema  Pulses: 2+ and symmetric all extremities  Skin:  No rashes or lesions  Neuro/psych: A&O x3. CN II through XII are grossly intact with appropriate affect              Lab Results   Component Value Date    GLUCOSE 106 (H) 07/08/2023    BUN 28 (H) 07/08/2023    CREATININE 1.21 07/08/2023    EGFR 64.0 07/08/2023    BCR 23.1 07/08/2023    K 4.4 07/08/2023    CO2 24.0 07/08/2023    CALCIUM 9.0 07/08/2023    ALBUMIN 4.20 05/05/2020    BILITOT 0.7 05/03/2020 "    AST 35 05/03/2020    ALT 31 05/03/2020     Results for orders placed in visit on 05/04/20    Emergent/Open-Heart Anesthesia ALICE    Narrative  Procedure Performed: Emergent/Open-Heart Anesthesia ALICE  Start Time:  End Time:    Preanesthesia Checklist:  Patient identified, IV assessed, risks and benefits discussed, monitors and equipment assessed, procedure being performed at surgeon's request and anesthesia consent obtained.    General Procedure Information  Diagnostic Indications for Echo:  assessment of surgical repair and hemodynamic monitoring  Location performed:  OR  Intubated  Bite block placed  Heart visualized  Probe Insertion:  Easy  Probe Type:  Biplane and multiplane  Modalities:  Color flow mapping, pulse wave Doppler and continuous wave Doppler    Echocardiographic and Doppler Measurements    Ventricles    Right Ventricle:  Cavity size normal.  Hypertrophy not present.  Thrombus not present.  Global function normal.  Ejection Fraction 60%.  Left Ventricle:  Cavity size normal.  Hypertrophy present.  Thrombus not present.  Global Function normal.  Ejection Fraction 60%.    Ventricular Regional Function:  1- Basal Anteroseptal:  normal  2- Basal Anterior:  normal  3- Basal Anterolateral:  normal  4- Basal Inferolateral:  normal  5- Basal Inferior:  normal  6- Basal Inferoseptal:  normal  7- Mid Anteroseptal:  normal  8- Mid Anterior:  normal  9- Mid Anterolateral:  normal  10- Mid Inferolateral:  normal  11- Mid Inferior:  normal  12- Mid Inferoseptal:  normal  13- Apical Anterior:  normal  14- Apical Lateral:  normal  15- Apical Inferior:  normal  16- Apical Septal:  normal  17- [place]:  normal      Valves    Aortic Valve:  Annulus normal.  Stenosis not present.  Regurgitation trace.  Leaflets normal.  Leaflet motions normal.    Mitral Valve:  Annulus normal.  Stenosis not present.  Regurgitation trace.  Leaflets normal.  Leaflet motions normal.    Tricuspid Valve:  Annulus normal.  Stenosis not present.   Regurgitation absent.  Leaflets normal.  Leaflet motions normal.  Pulmonic Valve:  Annulus normal.  Stenosis not present.  Regurgitation absent.        Aorta    Ascending Aorta:  Size normal.  Dissection not present.  Plaque thickness less than 3 mm.  Mobile plaque not present.  Aortic Arch:  Size normal.  Dissection not present.  Plaque thickness less than 3 mm.  Mobile plaque not present.  Descending Aorta:  Size normal.  Dissection not present.  Plaque thickness less than 3 mm.  Mobile plaque not present.        Atria    Right Atrium:  Size normal.  Left Atrium:  Size normal.  Spontaneous echo contrast not present.  Thrombus not present.  Tumor not present.  Device not present.  Left atrial appendage normal.      Septa    Atrial Septum:  Intra-atrial septal morphology normal.    Ventricular Septum:  Intra-ventricular septum morphology normal.        Other Findings  Pleural Effusion:  none  Pulmonary Arteries:  normal      Anesthesia Information  Performed Personally  Anesthesiologist:  Vega Pierson MD      Echocardiogram Comments:  Post cpb no change     Results for orders placed during the hospital encounter of 05/02/20    Cardiac Catheterization/Vascular Study    Narrative  Table formatting from the original result was not included.  Cardiac Catheterization Operative Report    Keshawn Villelavladimir  [id number]  5/3/2020  @PCP@    He underwent cardiac catheterization.    Indications for the procedure include: acute coronary syndrome.    Indication  Non-ST elevation myocardial infarction  Chest discomfort  Hypertension  Hyperlipidemia    Procedures  Selective left and right coronary angiogram  Left heart catheterization  Left ventriculogram    Procedure Details:  The risks, benefits, complications, treatment options, and expected outcomes were discussed with the patient. The patient and/or family concurred with the proposed plan, giving informed consent.    After informed consent the patient was brought to the cath  lab after appropriate IV hydration was begun and oral premedication was given. He was further sedated with fentanyl. He was prepped and draped in the usual manner. Using the modified Seldinger access technique, a 6 Kosovan sheath was placed in the femoral artery. A left heart catheterization with coronary arteriography was performed. Findings are discussed below.    After the procedure was completed, sedation was stopped and the sheaths and catheters were all removed. Hemostasis was achieved per established hospital protocols.    Findings:    Hemodynamics  Central aortic pressure systolic of 111 diastolic 71 with a mean pressure of 88  LV end-diastolic pressure was 10 mmHg  Left Main  angiographically normal  RCA  codominant vessel  Mid long segment angiographic 70 to 80% stenosis  Distal focal area of 99% stenosis  LAD  mid 20 to 30% stenosis  Still focal area of 30% stenosis  First diagonal branch is a small to moderate sized vessel bifurcates into 2 branches both of them have a 99% stenosis  Second diagonal branch is a small sized vessel has ostial 90% stenosis  Circ  mid 95% stenosis  Bifurcates into 2 marginal branches  Lower marginal branch is proximal 50% stenosis  Moderate sized ramus branch with ostial 30 to 40% stenosis in the proximal 90% stenosis  LV Normal LV systolic function with normal wall motion with estimated LV ejection fraction of 55 to 60%  Left ventricular end-diastolic pressure was 10 mmHg  Coronary dominance  codominant system    Estimated Blood Loss:  Minimal    Complications:  None; patient tolerated the procedure well.    Disposition: PACU - hemodynamically stable.    Condition: stable    Impressions:  Chest discomfort  Non-ST elevation myocardial infarction  Hypertension  Hyperlipidemia    Multivessel coronary artery disease  Significant obstructive coronary artery disease involving the right coronary artery left circumflex artery ramus branch and also diagonal system  Normal LV systolic  function  Normal wall motion  Normal left-sided filling pressures  Estimated LV ejection fraction of 55 to 60%    Recommendations:  Aggressive risk factor modification  Cath films reviewed with the patient and also surgical team  Consideration for coronary artery bypass surgery versus two-vessel stenting of the right coronary artery and left circumflex artery  Admit the patient to CVCU bed  Further recommendations based on patient hospital course     Lab Results   Component Value Date    CHOL  12/14/2021      Comment:      <100    CHLPL 166 07/01/2024    TRIG 87 07/01/2024    HDL 35 (L) 07/01/2024     (H) 07/01/2024      Results for orders placed during the hospital encounter of 05/26/20    Treadmill Stress Test    Interpretation Summary  · Treadmill stress test post CABG for evaluation for cardiac rehab  · Patient exercised 6 minutes on Alex protocol achieving 7 METS with no ischemic EKG changes no symptoms            Objective:          Allergies:  Allergies   Allergen Reactions    Neosporin [Bacitracin-Polymyxin B] Other (See Comments)     Burning sensation        Medication Review:     Current Outpatient Medications:     amLODIPine (NORVASC) 5 MG tablet, Take 1 tablet by mouth Daily., Disp: 90 tablet, Rfl: 3    aspirin 81 MG EC tablet, Take 1 tablet by mouth Daily., Disp: 30 tablet, Rfl: 3    atorvastatin (LIPITOR) 20 MG tablet, Take 1 tablet by mouth Daily., Disp: , Rfl:     Cholecalciferol (VITAMIN D3) 125 MCG (5000 UT) capsule capsule, Take 1 capsule by mouth Daily., Disp: , Rfl:     lisinopril (PRINIVIL,ZESTRIL) 40 MG tablet, Take 1 tablet by mouth Daily., Disp: 90 tablet, Rfl: 3    metoprolol succinate XL (TOPROL-XL) 25 MG 24 hr tablet, TAKE 1 TABLET BY MOUTH EVERY DAY, Disp: 90 tablet, Rfl: 1    vitamin C (ASCORBIC ACID) 250 MG tablet, Take 1 tablet by mouth Daily., Disp: , Rfl:     Zinc 100 MG tablet, Take  by mouth., Disp: , Rfl:     Family History:  Family History   Problem Relation Age of Onset     Hypertension Mother     Hypertension Father     Atrial fibrillation Sister     Arrhythmia Sister     Hypertension Sister     Hypertension Brother        Past Medical History:  Past Medical History:   Diagnosis Date    Coronary artery disease     Hyperlipidemia     Hypertension        Past surgical History:  Past Surgical History:   Procedure Laterality Date    CARDIAC CATHETERIZATION Left 05/03/2020    Procedure: LEFT HEART CATH with possible PCI;  Surgeon: Eliezer Hall MD;  Location: [facility] CATH INVASIVE LOCATION;  Service: Cardiovascular;  Laterality: Left;  Local and IV sedation    CORONARY ARTERY BYPASS GRAFT N/A 05/04/2020    Procedure: CORONARY ARTERY BYPASS WITH INTERNAL MAMMARY ARTERY GRAFT;  Surgeon: Yahir Ferris MD;  Location: [facility];  Service: Cardiothoracic;  Laterality: N/A;    FINGER SURGERY      TRANSESOPHAGEAL ECHOCARDIOGRAM (ALICE) N/A 05/04/2020    Procedure: TRANSESOPHAGEAL ECHOCARDIOGRAM WITH ANESTHESIA;  Surgeon: Yahir Ferris MD;  Location: [facility];  Service: Cardiothoracic;  Laterality: N/A;    UVULECTOMY         Social History:  Social History     Socioeconomic History    Marital status:    Tobacco Use    Smoking status: Never    Smokeless tobacco: Never   Vaping Use    Vaping status: Never Used   Substance and Sexual Activity    Alcohol use: Not Currently     Comment: Occasionally    Drug use: Never    Sexual activity: Defer       Review of Systems:  The following systems were reviewed as they relate to the cardiovascular system: Constitutional, Eyes, ENT, Cardiovascular, Respiratory, Gastrointestinal, Integumentary, Neurological, Psychiatric, Hematologic, Endocrine, Musculoskeletal, and Genitourinary. The pertinent cardiovascular findings are reported above with all other cardiovascular points within those systems being negative.    Diagnostic Study Review:     Current Electrocardiogram:    ECG 12 Lead    Date/Time: 1/8/2025 10:39 AM  Performed by:  Eliezer Hall MD    Authorized by: Eliezer Hall MD  Comparison: compared with previous ECG   Similar to previous ECG  Rhythm: sinus rhythm  Rate: normal  BPM: 55  Conduction: 1st degree AV block  ST Segments: ST segments normal  T Waves: T waves normal  QRS axis: normal    Clinical impression: normal ECG                NOTE: The following portions of the patient's history were reviewed and updated this visit as appropriate: allergies, current medications, past family history, past medical history, past social history, past surgical history and problem list.

## 2025-01-16 ENCOUNTER — TELEPHONE (OUTPATIENT)
Dept: CARDIOLOGY | Facility: CLINIC | Age: 75
End: 2025-01-16

## 2025-01-16 DIAGNOSIS — I10 ESSENTIAL HYPERTENSION: Primary | Chronic | ICD-10-CM

## 2025-01-16 NOTE — TELEPHONE ENCOUNTER
Called and spoke with the patient's wife. Informed her of the patient's lab results. She is concerned with the patient's GFR level. I have sent a message to Dr. Hall about her concern.

## 2025-01-16 NOTE — TELEPHONE ENCOUNTER
Caller: KORTNEY WARNER    Relationship: Emergency Contact    Best call back number: [phone]    PATIENTS WIFE CALLED IN REQUESTING LAB RESULTS.  PLEASE CALL ASAP, SHE IS REQUESTING A CALL BACK TODAY.

## 2025-01-17 ENCOUNTER — TELEPHONE (OUTPATIENT)
Dept: CARDIOLOGY | Facility: CLINIC | Age: 75
End: 2025-01-17
Payer: COMMERCIAL

## 2025-01-17 NOTE — TELEPHONE ENCOUNTER
Called and spoke with the patient. Informing the patient of the recommendations commented below from Dr. Hall. Patient acknowledged this information, and stated that he will increase the Lipitor.     ----- Message from Eliezer Hall sent at 1/16/2025  2:31 PM EST -----  GFR still looks okay  Primary care physician should be able to take care of the kidney problems for now  If the hyperkalemia does not improve he might needs to see a nephrologist  Also recommend increasing the dose of Lipitor from 20 to 40 mg p.o. once a day for elevated LDL cholesterol  ----- Message -----  From: Kiersten Arambula MA  Sent: 1/16/2025   2:05 PM EST  To: Eliezer Hall MD    Called and spoke with the patient's wife. I informed her of the lab results. She is concerned about the patient GFR readings. Patient's wife is wanting to know if that is something that we need to be concerned about?  ----- Message -----  From: Eliezer Hall MD  Sent: 1/16/2025  10:02 AM EST  To: Kiersten Arambula MA    Labs look okay except for mildly elevated potassium level  Advised patient to try low potassium diet  Recheck BMP in 2 weeks  ----- Message -----  From: Juan Antonio Jacobs Incoming  Sent: 1/16/2025   9:10 AM EST  To: Eliezer Hall MD

## 2025-01-22 ENCOUNTER — TELEPHONE (OUTPATIENT)
Dept: CARDIOLOGY | Facility: CLINIC | Age: 75
End: 2025-01-22

## 2025-01-22 RX ORDER — VALSARTAN AND HYDROCHLOROTHIAZIDE 160; 12.5 MG/1; MG/1
1 TABLET, FILM COATED ORAL DAILY
Qty: 90 TABLET | Refills: 3 | Status: SHIPPED | OUTPATIENT
Start: 2025-01-22

## 2025-01-22 NOTE — TELEPHONE ENCOUNTER
Caller: KORTNEY WARNER    Relationship to patient: Emergency Contact    Best call back number: [phone]    Patient is needing: PT'S WIFE CALLED TO LET DR. MCDERMOTT KNOW THAT PT'S BP IS SPIKING AT NIGHT. IS NORMAL IN THE MORNING AND THROUGHOUT THE DAY. AROUND 6PM IT STARTS TO ELEVATE.       1.21  6PM    164/86  AND LATER IT WAS OVER 180    1.20  205/103

## 2025-01-23 ENCOUNTER — TELEPHONE (OUTPATIENT)
Dept: CARDIOLOGY | Facility: CLINIC | Age: 75
End: 2025-01-23
Payer: COMMERCIAL

## 2025-01-23 NOTE — TELEPHONE ENCOUNTER
Dr. Hall called and spoke with the patient yesterday.       ----- Message from Eliezer Hall sent at 1/22/2025 11:44 AM EST -----  find out what time is taking his blood pressure medication  If patient taking all his medications in the morning consider taking amlodipine in the evening  ----- Message -----  From: Kiersten Arambula MA  Sent: 1/22/2025  11:05 AM EST  To: Eliezer Hall MD    Caller: KORTNEY WARNER     Relationship to patient: Emergency Contact     Best call back number: [phone]     Patient is needing: PT'S WIFE CALLED TO LET DR. MCDERMOTT KNOW THAT PT'S BP IS SPIKING AT NIGHT. IS NORMAL IN THE MORNING AND THROUGHOUT THE DAY. AROUND 6PM IT STARTS TO ELEVATE.         1.21  6PM    164/86  AND LATER IT WAS OVER 180     1.20  205/103

## 2025-03-18 ENCOUNTER — TELEPHONE (OUTPATIENT)
Dept: CARDIAC SURGERY | Facility: CLINIC | Age: 75
End: 2025-03-18
Payer: COMMERCIAL

## 2025-03-18 NOTE — TELEPHONE ENCOUNTER
Caller: Keshawn Lr     Relationship: SELF    Best call back number: [phone]    What is your medical concern? PT WANTS TO SPEAK WITH DR MONCADA REGARDING BYPASS SURGERY HE HAD IN 2020-PT HAD A CALCIUM SCORE DONE RECENTLY THAT WAS PRETTY HIGH AND HE HAS SOME CONCERNS- SPOUSE WANTED TO SCHEDULE APPT TO SEE DR MONCADA BUT TIME FRAME IS OVER THE 3 YEAR SELIN     How long has this issue been going on? 3.18.25    Is your provider already aware of this issue? NO    Have you been treated for this issue? NO

## 2025-03-18 NOTE — TELEPHONE ENCOUNTER
Testing ordered by . Patient last seen in office 2020. Please have patient to contact  to go over results.

## 2025-04-07 RX ORDER — METOPROLOL SUCCINATE 25 MG/1
25 TABLET, EXTENDED RELEASE ORAL DAILY
Qty: 90 TABLET | Refills: 1 | Status: SHIPPED | OUTPATIENT
Start: 2025-04-07

## 2025-07-15 ENCOUNTER — TELEPHONE (OUTPATIENT)
Dept: CARDIOLOGY | Facility: CLINIC | Age: 75
End: 2025-07-15

## 2025-07-16 ENCOUNTER — LAB (OUTPATIENT)
Dept: LAB | Facility: HOSPITAL | Age: 75
End: 2025-07-16
Payer: COMMERCIAL

## 2025-07-16 DIAGNOSIS — Z95.1 S/P CABG X 4: ICD-10-CM

## 2025-07-16 DIAGNOSIS — R53.83 OTHER FATIGUE: ICD-10-CM

## 2025-07-16 DIAGNOSIS — E78.5 DYSLIPIDEMIA: ICD-10-CM

## 2025-07-16 DIAGNOSIS — I25.118 CORONARY ARTERY DISEASE OF NATIVE ARTERY OF NATIVE HEART WITH STABLE ANGINA PECTORIS: ICD-10-CM

## 2025-07-16 DIAGNOSIS — I25.10 CORONARY ARTERY DISEASE INVOLVING NATIVE CORONARY ARTERY OF NATIVE HEART WITHOUT ANGINA PECTORIS: ICD-10-CM

## 2025-07-16 DIAGNOSIS — I10 ESSENTIAL HYPERTENSION: ICD-10-CM

## 2025-07-16 DIAGNOSIS — I21.4 SUBENDOCARDIAL MI FIRST EPISODE CARE: ICD-10-CM

## 2025-07-16 DIAGNOSIS — E78.5 HYPERLIPIDEMIA LDL GOAL <100: ICD-10-CM

## 2025-07-16 LAB
25(OH)D3 SERPL-MCNC: 70.9 NG/ML (ref 30–100)
ALBUMIN SERPL-MCNC: 4 G/DL (ref 3.5–5.2)
ALBUMIN/GLOB SERPL: 1.4 G/DL
ALP SERPL-CCNC: 80 U/L (ref 39–117)
ALT SERPL W P-5'-P-CCNC: 28 U/L (ref 1–41)
ANION GAP SERPL CALCULATED.3IONS-SCNC: 9.3 MMOL/L (ref 5–15)
AST SERPL-CCNC: 26 U/L (ref 1–40)
BASOPHILS # BLD AUTO: 0.06 10*3/MM3 (ref 0–0.2)
BASOPHILS NFR BLD AUTO: 1 % (ref 0–1.5)
BILIRUB SERPL-MCNC: 1 MG/DL (ref 0–1.2)
BUN SERPL-MCNC: 24 MG/DL (ref 8–23)
BUN/CREAT SERPL: 18.8 (ref 7–25)
CALCIUM SPEC-SCNC: 9.1 MG/DL (ref 8.6–10.5)
CHLORIDE SERPL-SCNC: 104 MMOL/L (ref 98–107)
CHOLEST SERPL-MCNC: 184 MG/DL (ref 0–200)
CO2 SERPL-SCNC: 26.7 MMOL/L (ref 22–29)
CREAT SERPL-MCNC: 1.28 MG/DL (ref 0.76–1.27)
DEPRECATED RDW RBC AUTO: 46.3 FL (ref 37–54)
EGFRCR SERPLBLD CKD-EPI 2021: 59.1 ML/MIN/1.73
EOSINOPHIL # BLD AUTO: 0.29 10*3/MM3 (ref 0–0.4)
EOSINOPHIL NFR BLD AUTO: 4.8 % (ref 0.3–6.2)
ERYTHROCYTE [DISTWIDTH] IN BLOOD BY AUTOMATED COUNT: 13.2 % (ref 12.3–15.4)
GLOBULIN UR ELPH-MCNC: 2.9 GM/DL
GLUCOSE SERPL-MCNC: 92 MG/DL (ref 65–99)
HCT VFR BLD AUTO: 44.6 % (ref 37.5–51)
HDLC SERPL-MCNC: 47 MG/DL (ref 40–60)
HGB BLD-MCNC: 14.9 G/DL (ref 13–17.7)
IMM GRANULOCYTES # BLD AUTO: 0.01 10*3/MM3 (ref 0–0.05)
IMM GRANULOCYTES NFR BLD AUTO: 0.2 % (ref 0–0.5)
LDLC SERPL CALC-MCNC: 120 MG/DL (ref 0–100)
LDLC/HDLC SERPL: 2.52 {RATIO}
LYMPHOCYTES # BLD AUTO: 2.26 10*3/MM3 (ref 0.7–3.1)
LYMPHOCYTES NFR BLD AUTO: 37.5 % (ref 19.6–45.3)
MAGNESIUM SERPL-MCNC: 2.2 MG/DL (ref 1.6–2.4)
MCH RBC QN AUTO: 32.5 PG (ref 26.6–33)
MCHC RBC AUTO-ENTMCNC: 33.4 G/DL (ref 31.5–35.7)
MCV RBC AUTO: 97.4 FL (ref 79–97)
MONOCYTES # BLD AUTO: 0.47 10*3/MM3 (ref 0.1–0.9)
MONOCYTES NFR BLD AUTO: 7.8 % (ref 5–12)
NEUTROPHILS NFR BLD AUTO: 2.93 10*3/MM3 (ref 1.7–7)
NEUTROPHILS NFR BLD AUTO: 48.7 % (ref 42.7–76)
NRBC BLD AUTO-RTO: 0 /100 WBC (ref 0–0.2)
PLATELET # BLD AUTO: 232 10*3/MM3 (ref 140–450)
PMV BLD AUTO: 10.6 FL (ref 6–12)
POTASSIUM SERPL-SCNC: 4 MMOL/L (ref 3.5–5.2)
PROT SERPL-MCNC: 6.9 G/DL (ref 6–8.5)
RBC # BLD AUTO: 4.58 10*6/MM3 (ref 4.14–5.8)
SODIUM SERPL-SCNC: 140 MMOL/L (ref 136–145)
TRIGL SERPL-MCNC: 92 MG/DL (ref 0–150)
TSH SERPL DL<=0.05 MIU/L-ACNC: 5.13 UIU/ML (ref 0.27–4.2)
VLDLC SERPL-MCNC: 17 MG/DL (ref 5–40)
WBC NRBC COR # BLD AUTO: 6.02 10*3/MM3 (ref 3.4–10.8)

## 2025-07-16 PROCEDURE — 83735 ASSAY OF MAGNESIUM: CPT

## 2025-07-16 PROCEDURE — 36415 COLL VENOUS BLD VENIPUNCTURE: CPT

## 2025-07-16 PROCEDURE — 80061 LIPID PANEL: CPT

## 2025-07-16 PROCEDURE — 82306 VITAMIN D 25 HYDROXY: CPT

## 2025-07-16 PROCEDURE — 80050 GENERAL HEALTH PANEL: CPT

## 2025-07-18 ENCOUNTER — OFFICE VISIT (OUTPATIENT)
Dept: CARDIOLOGY | Facility: CLINIC | Age: 75
End: 2025-07-18
Payer: COMMERCIAL

## 2025-07-18 VITALS
HEART RATE: 50 BPM | OXYGEN SATURATION: 98 % | SYSTOLIC BLOOD PRESSURE: 138 MMHG | DIASTOLIC BLOOD PRESSURE: 76 MMHG | WEIGHT: 196 LBS | BODY MASS INDEX: 25.15 KG/M2 | RESPIRATION RATE: 16 BRPM | HEIGHT: 74 IN

## 2025-07-18 DIAGNOSIS — R60.0 BILATERAL LOWER EXTREMITY EDEMA: ICD-10-CM

## 2025-07-18 DIAGNOSIS — R06.09 DYSPNEA ON EXERTION: ICD-10-CM

## 2025-07-18 DIAGNOSIS — I25.10 CORONARY ARTERY DISEASE INVOLVING NATIVE CORONARY ARTERY OF NATIVE HEART WITHOUT ANGINA PECTORIS: Primary | ICD-10-CM

## 2025-07-18 DIAGNOSIS — R53.83 FATIGUE, UNSPECIFIED TYPE: ICD-10-CM

## 2025-07-18 RX ORDER — MULTIPLE VITAMINS W/ MINERALS TAB 9MG-400MCG
1 TAB ORAL DAILY
COMMUNITY

## 2025-07-18 RX ORDER — VITAMIN B COMPLEX
CAPSULE ORAL DAILY
COMMUNITY

## 2025-07-18 RX ORDER — ROSUVASTATIN CALCIUM 10 MG/1
10 TABLET, COATED ORAL DAILY
COMMUNITY

## 2025-07-18 NOTE — PROGRESS NOTES
[facility] CARDIOLOGY      REASON FOR FOLLOW-UP:  Coronary artery disease          Chief Complaint   Patient presents with    Follow-up     6 month         Dear Marshall Guidry MD        History of Present Illness   Keshawn Lr is a 75-year-old gentleman with known history of severe three-vessel coronary artery disease status post coronary artery bypass grafting with LIMA 5/4/2020.  Additional past medical history of hypertension, dyslipidemia, whitecoat hypertension.    Mr. Lr presents today with his wife who specifically asked about his most recent Agatston score which has increased by about 300 points from previous with increased concentration in the LAD.  The patient reports no symptoms of chest pain, pressure, tightness, squeezing, palpitations.  He denies any lower extremity edema, dizziness, lightheadedness, arm pain.  He apparently is very active and actually is trying to start running again.  He does report some shortness of breath with activity that appears to be mild.  The patient's wife was concerned about recent labs including creatinine and GFR.  His creatinine on 7/16/2025 was 1.28 (1.21, 1.19), GFR 59.1 (64).  I explained to them in detail that he is currently on medications that can cause abnormal renal function.  A creatinine of 100th of a point greater than normal is not concerning.  If his creatinine trends upward or he has at least 3 abnormal creatinine measurements, his medications may need to be adjusted.  This will be monitored closely by cardiology as well as primary care.  The patient reported poor sleep.  He stated that he gets 6-7 hours of sleep per night.  He is able to fall asleep quickly, but awakens and unable to go back to sleep.  He reported 1 episode remotely of some mild swelling to his ankles which has not reoccurred.      3/7/2025:  CALCIUM SCORING:   Left main coronary artery: 901   Left anterior descending coronary artery: 171.3   Left  Circumflex coronary artery: 208.5   Right coronary artery: 621.5    Labs reviewed 7/16/2025: , TSH 5.13, creatinine 1.28 (1.21, 1.19), CBC unremarkable      ASSESSMENT:  Coronary artery disease status post three-vessel CABG  Primary hypertension  Dyslipidemia  Abnormal serum creatinine without diagnosis kidney disease  Poor sleep  Isolated episode of ankle edema      PLAN:  Follow-up with primary care regarding sleep  Patient and wife were reassured that cardiology and primary care will continue to monitor his renal function as well as electrolytes  He should continue risk factor modification including heart healthy diet, activity as tolerated, healthy weight  If he has any further lower extremity edema, can consider echocardiogram  I will order plain treadmill stress testing to evaluate for any evidence of ischemia given the increase in his Agatston score.        CHF Guideline Directed Medical Therapy  Beta Blocker:   ARNI/ACE/ARB:   SGLT 2 inhibitors:   Diuretics:   Aldosterone Antagonist:   Vasodilators & Nitrates:       Diagnoses and all orders for this visit:    1. Dyspnea on exertion (Primary)  -     Treadmill Stress Test; Future    2. Coronary artery disease involving native coronary artery of native heart without angina pectoris  -     Treadmill Stress Test; Future    3. Fatigue, unspecified type  -     Treadmill Stress Test; Future    4. Bilateral lower extremity edema    Other orders  -     ECG 12 Lead          The following portions of the patient's history were reviewed and updated as appropriate: allergies, current medications, past family history, past medical history, past social history, past surgical history, and problem list.    REVIEW OF SYSTEMS:    Review of Systems   Constitutional:        Poor sleep   Cardiovascular:  Positive for dyspnea on exertion and leg swelling.   All other systems reviewed and are negative.      Vitals:    07/18/25 0818   BP: 138/76   Pulse: 50   Resp: 16   SpO2:  98%         PHYSICAL EXAM:    General: Alert, cooperative, no distress, appears stated age  Head:  Normocephalic, atraumatic, mucous membranes moist  Eyes:  Conjunctiva/corneas clear, EOM's intact     Neck:  Supple,  no JVD or bruit     Lungs: Clear to auscultation bilaterally, no wheezes rhonchi rales are noted  Chest wall: No tenderness  Musculoskeletal:   Ambulates freely without assistance  Heart::  Regular rate and rhythm, S1 and S2 normal, no murmur, rub or gallop  Abdomen: Soft, non-tender, nondistended, bowel sounds active, no abdominal bruit  Extremities: No cyanosis, clubbing, or edema   Pulses: 2+ and symmetric all extremities  Skin:  No rashes or lesions  Neuro/psych: A&O x3. CN II through XII are grossly intact with appropriate affect        Past Medical History:   Diagnosis Date    Coronary artery disease     Hyperlipidemia     Hypertension        Past Surgical History:   Procedure Laterality Date    CARDIAC CATHETERIZATION Left 05/03/2020    Procedure: LEFT HEART CATH with possible PCI;  Surgeon: Eliezer Hall MD;  Location: [facility] CATH INVASIVE LOCATION;  Service: Cardiovascular;  Laterality: Left;  Local and IV sedation    CORONARY ARTERY BYPASS GRAFT N/A 05/04/2020    Procedure: CORONARY ARTERY BYPASS WITH INTERNAL MAMMARY ARTERY GRAFT;  Surgeon: Yahir Ferris MD;  Location: [facility];  Service: Cardiothoracic;  Laterality: N/A;    FINGER SURGERY      TRANSESOPHAGEAL ECHOCARDIOGRAM (ALICE) N/A 05/04/2020    Procedure: TRANSESOPHAGEAL ECHOCARDIOGRAM WITH ANESTHESIA;  Surgeon: Yahir Ferris MD;  Location: [facility];  Service: Cardiothoracic;  Laterality: N/A;    UVULECTOMY           Current Outpatient Medications:     aspirin 81 MG EC tablet, Take 1 tablet by mouth Daily., Disp: 30 tablet, Rfl: 3    B Complex Vitamins (vitamin b complex) capsule capsule, Take  by mouth Daily., Disp: , Rfl:     BL MAGNESIUM PO, Take  by mouth., Disp: , Rfl:     Cholecalciferol (VITAMIN D3) 125 MCG  "(5000 UT) capsule capsule, Take 1 capsule by mouth Daily., Disp: , Rfl:     metoprolol succinate XL (TOPROL-XL) 25 MG 24 hr tablet, TAKE 1 TABLET BY MOUTH EVERY DAY, Disp: 90 tablet, Rfl: 1    multivitamin with minerals tablet tablet, Take 1 tablet by mouth Daily., Disp: , Rfl:     NON FORMULARY, Take 500 mg by mouth Daily. TMG, Disp: , Rfl:     rosuvastatin (CRESTOR) 10 MG tablet, Take 1 tablet by mouth Daily., Disp: , Rfl:     valsartan-hydrochlorothiazide (Diovan HCT) 160-12.5 MG per tablet, Take 1 tablet by mouth Daily., Disp: 90 tablet, Rfl: 3    vitamin C (ASCORBIC ACID) 250 MG tablet, Take 1 tablet by mouth Daily., Disp: , Rfl:     Zinc 100 MG tablet, Take  by mouth., Disp: , Rfl:     Allergies   Allergen Reactions    Neosporin [Bacitracin-Polymyxin B] Other (See Comments)     Burning sensation        Family History   Problem Relation Age of Onset    Hypertension Mother     Hypertension Father     Atrial fibrillation Sister     Arrhythmia Sister     Hypertension Sister     Hypertension Brother        Social History     Tobacco Use    Smoking status: Never    Smokeless tobacco: Never   Substance Use Topics    Alcohol use: Not Currently     Comment: Occasionally           Current Electrocardiogram:    ECG 12 Lead    Date/Time: 7/18/2025 9:21 AM  Performed by: Helga Odom APRN    Authorized by: Helga Odom APRN  Comparison: compared with previous ECG from 1/8/2025  Similar to previous ECG  Rhythm: sinus bradycardia  BPM: 50              EMR Dragon/Transcription:   \"Dictated utilizing Dragon dictation\".     Copied text in this note has been reviewed by me and is accurate as of 07/18/25.    I spent > 30 minutes caring for eKshawn Lr on this date of service. This time includes time spent by me in the following activities:preparing for the visit, reviewing tests, obtaining and/or reviewing a separately obtained history, performing a medically appropriate examination and/or evaluation , " counseling and educating the patient/family/caregiver, ordering medications, tests, or procedures, referring and communicating with other health care professionals , documenting information in the medical record, independently interpreting results and communicating that information with the patient/family/caregiver, and care coordination

## 2025-07-29 ENCOUNTER — HOSPITAL ENCOUNTER (OUTPATIENT)
Dept: CARDIOLOGY | Facility: HOSPITAL | Age: 75
Discharge: HOME | End: 2025-07-29
Admitting: NURSE PRACTITIONER
Payer: COMMERCIAL

## 2025-07-29 DIAGNOSIS — R53.83 FATIGUE, UNSPECIFIED TYPE: ICD-10-CM

## 2025-07-29 DIAGNOSIS — I25.10 CORONARY ARTERY DISEASE INVOLVING NATIVE CORONARY ARTERY OF NATIVE HEART WITHOUT ANGINA PECTORIS: ICD-10-CM

## 2025-07-29 DIAGNOSIS — R06.09 DYSPNEA ON EXERTION: ICD-10-CM

## 2025-07-29 LAB
BH CV STRESS BP STAGE 1: NORMAL
BH CV STRESS BP STAGE 2: NORMAL
BH CV STRESS DURATION MIN STAGE 1: 3
BH CV STRESS DURATION MIN STAGE 2: 3
BH CV STRESS DURATION MIN STAGE 3: 3
BH CV STRESS DURATION MIN STAGE 4: 3
BH CV STRESS DURATION SEC STAGE 1: 0
BH CV STRESS DURATION SEC STAGE 2: 0
BH CV STRESS DURATION SEC STAGE 3: 0
BH CV STRESS DURATION SEC STAGE 4: 0
BH CV STRESS GRADE STAGE 1: 10
BH CV STRESS GRADE STAGE 2: 12
BH CV STRESS GRADE STAGE 3: 14
BH CV STRESS GRADE STAGE 4: 16
BH CV STRESS HR STAGE 1: 99
BH CV STRESS HR STAGE 2: 137
BH CV STRESS HR STAGE 3: 166
BH CV STRESS HR STAGE 4: 196
BH CV STRESS METS STAGE 1: 5
BH CV STRESS METS STAGE 2: 7.5
BH CV STRESS METS STAGE 3: 10
BH CV STRESS METS STAGE 4: 13.5
BH CV STRESS PROTOCOL 1: NORMAL
BH CV STRESS RECOVERY BP: NORMAL MMHG
BH CV STRESS RECOVERY HR: 83 BPM
BH CV STRESS SPEED STAGE 1: 1.7
BH CV STRESS SPEED STAGE 2: 2.5
BH CV STRESS SPEED STAGE 3: 3.4
BH CV STRESS SPEED STAGE 4: 4.2
BH CV STRESS STAGE 1: 1
BH CV STRESS STAGE 2: 2
BH CV STRESS STAGE 3: 3
BH CV STRESS STAGE 4: 4
MAXIMAL PREDICTED HEART RATE: 147 BPM
STRESS BASELINE BP: NORMAL MMHG
STRESS BASELINE HR: 68 BPM
STRESS POST ESTIMATED WORKLOAD: 10 METS
STRESS POST EXERCISE DUR MIN: 9 MIN
STRESS POST EXERCISE DUR SEC: 0 SEC
STRESS TARGET HR: 125 BPM

## 2025-07-29 PROCEDURE — 93017 CV STRESS TEST TRACING ONLY: CPT

## 2025-07-29 PROCEDURE — 93018 CV STRESS TEST I&R ONLY: CPT | Performed by: STUDENT IN AN ORGANIZED HEALTH CARE EDUCATION/TRAINING PROGRAM

## 2025-07-29 PROCEDURE — 93016 CV STRESS TEST SUPVJ ONLY: CPT | Performed by: STUDENT IN AN ORGANIZED HEALTH CARE EDUCATION/TRAINING PROGRAM

## 2025-08-05 ENCOUNTER — OFFICE VISIT (OUTPATIENT)
Dept: CARDIOLOGY | Facility: CLINIC | Age: 75
End: 2025-08-05
Payer: COMMERCIAL

## 2025-08-05 VITALS
HEIGHT: 74 IN | HEART RATE: 79 BPM | DIASTOLIC BLOOD PRESSURE: 82 MMHG | SYSTOLIC BLOOD PRESSURE: 161 MMHG | WEIGHT: 194 LBS | RESPIRATION RATE: 16 BRPM | BODY MASS INDEX: 24.9 KG/M2 | OXYGEN SATURATION: 98 %

## 2025-08-05 DIAGNOSIS — I10 ESSENTIAL HYPERTENSION: ICD-10-CM

## 2025-08-05 DIAGNOSIS — I25.10 CORONARY ARTERY DISEASE INVOLVING NATIVE CORONARY ARTERY OF NATIVE HEART WITHOUT ANGINA PECTORIS: ICD-10-CM

## 2025-08-05 DIAGNOSIS — Z95.1 S/P CABG X 4: Primary | ICD-10-CM

## 2025-08-05 DIAGNOSIS — E78.5 HYPERLIPIDEMIA LDL GOAL <100: Primary | ICD-10-CM

## 2025-08-05 DIAGNOSIS — I25.110 CORONARY ARTERY DISEASE INVOLVING NATIVE CORONARY ARTERY OF NATIVE HEART WITH UNSTABLE ANGINA PECTORIS: ICD-10-CM

## 2025-08-05 DIAGNOSIS — E78.5 DYSLIPIDEMIA: ICD-10-CM

## 2025-08-05 DIAGNOSIS — Z95.1 S/P CABG X 4: ICD-10-CM

## 2025-08-05 PROCEDURE — 99214 OFFICE O/P EST MOD 30 MIN: CPT | Performed by: INTERNAL MEDICINE

## (undated) DEVICE — HEMOCONCENTRATOR PERFUS LPS06

## (undated) DEVICE — CATH DIAG IMPULSE PIG .056 6F 110CM

## (undated) DEVICE — SUT VIC COAT PLS ANTIBAC TP1 27IN

## (undated) DEVICE — PINNACLE INTRODUCER SHEATH: Brand: PINNACLE

## (undated) DEVICE — SUT SILK 0 CT1 CR8 18IN C021D

## (undated) DEVICE — SUT PDS 0 CT-1 Z340H

## (undated) DEVICE — SUT SILK 2/0 TIES 18IN A185H

## (undated) DEVICE — TOWEL,OR,DSP,ST,WHITE,DLX,4/PK,20PK/CS: Brand: MEDLINE

## (undated) DEVICE — ELECTRD BLD EZ CLN STD 6.5IN

## (undated) DEVICE — CATH DIAG IMPULSE FL4 6F 100CM

## (undated) DEVICE — Device

## (undated) DEVICE — TUBING, SUCTION, 1/4" X 12', STRAIGHT: Brand: MEDLINE

## (undated) DEVICE — SUT VIC 2/0 CT1 CR8 18IN J839D

## (undated) DEVICE — SOL IRRIG NACL 9PCT 1000ML BTL

## (undated) DEVICE — CANN ART EOPA 3D NV W/CONN 20F

## (undated) DEVICE — TEMP PACING WIRE: Brand: MYO/WIRE

## (undated) DEVICE — PDS II VLT 0 107CM AG ST3: Brand: ENDOLOOP

## (undated) DEVICE — SUT PROLN 6/0 RB2 D/A 30IN 8711H

## (undated) DEVICE — SUT PROLN 5/0 RB1 D/A 36IN 8556H

## (undated) DEVICE — SOL NACL 0.9PCT 1000ML

## (undated) DEVICE — SOL IRRIG H2O 1000ML STRL

## (undated) DEVICE — CATH DIAG IMPULSE FR4 6F 100CM

## (undated) DEVICE — GW PTFE EMERALD HEPCOAT FC J TIP STD .035 3MM 150CM

## (undated) DEVICE — RADIFOCUS OBTURATOR: Brand: RADIFOCUS

## (undated) DEVICE — ANTIBACTERIAL UNDYED BRAIDED (POLYGLACTIN 910), SYNTHETIC ABSORBABLE SUTURE: Brand: COATED VICRYL

## (undated) DEVICE — SUT SILK 4/0 TIES 18IN A183H

## (undated) DEVICE — SUT SILK 2/0 SH CR8 18IN CR8 C012D

## (undated) DEVICE — VASOVIEW HEMOPRO: Brand: VASOVIEW HEMOPRO

## (undated) DEVICE — ELECTRD DEFIB M/FUNC PROPADZ STRL 2PK

## (undated) DEVICE — SUT PROLN 7/0 BV1 D/A 30IN 8703H

## (undated) DEVICE — PK OPN HEART WHT WRP 50

## (undated) DEVICE — CABL BIPOL W/ALLGTR CLIP/SM 12FT

## (undated) DEVICE — SENSR CERBRL O2 PK/2

## (undated) DEVICE — SUT PROLN 4/0 V5 36IN 8935H

## (undated) DEVICE — CONNECT Y INTERSEPT W/LL 3/8 X 3/8 X 3/8IN

## (undated) DEVICE — SUT PROLENE PP 7/0 BV175-6 24IN

## (undated) DEVICE — CORONARY ARTERY BYPASS GRAFT MARKERS, STAINLESS STEEL, DISTAL, WITHOUT HOLDER: Brand: ANASTOMARK CORONARY ARTERY BYPASS GRAFT MARKERS, STAINLESS STEEL, DISTAL

## (undated) DEVICE — INSUFFLATION TUBING,LAPAROSCOPIC: Brand: DEROYAL

## (undated) DEVICE — PK TRY HEART CATH 50

## (undated) DEVICE — BLOWER MISTER CLEARVIEW W/TBG

## (undated) DEVICE — PK ATS CUST W CARDIOTOMY RESEVOIR

## (undated) DEVICE — ROTATING SURGICAL PUNCHES, 1 PER POUCH: Brand: A&E MEDICAL / ROTATING SURGICAL PUNCHES

## (undated) DEVICE — SPNG GZ AVANT 6PLY 4X4IN STRL PK/2

## (undated) DEVICE — SUCTION CANISTER, 1500CC,SAFELINER: Brand: DEROYAL

## (undated) DEVICE — GLV SURG BIOGEL LTX PF 7 1/2

## (undated) DEVICE — DRAPE SHEET ULTRAGARD: Brand: MEDLINE

## (undated) DEVICE — SUT SILK 2 SUTUPAK TIE 60IN SA8H 2STRAND

## (undated) DEVICE — SUT PROLN 4/0 V7 36IN 8975H

## (undated) DEVICE — SUT PROLN 8/0 BV130/5 D/A 24IN 8732H

## (undated) DEVICE — DRSNG WND GZ PAD BORDERED 4X8IN STRL

## (undated) DEVICE — SYS PERFUS SEP PLATLT W TIPS CUST

## (undated) DEVICE — PK PERFUS CUST W/CARDIOPLEGIA

## (undated) DEVICE — SKIN PREP TRAY W/CHG: Brand: MEDLINE INDUSTRIES, INC.

## (undated) DEVICE — CANN AORT ROOT DLP VNT/8IN 14G 7F

## (undated) DEVICE — BLD SCLPL BEAVR MINI STR 2BVL 180D LF

## (undated) DEVICE — 28 FR STRAIGHT – SOFT PVC CATHETER: Brand: PVC THORACIC CATHETERS

## (undated) DEVICE — 28 FR RIGHT ANGLE – SOFT PVC CATHETER: Brand: PVC THORACIC CATHETERS

## (undated) DEVICE — SUT PROLN 3/0 V7 D/A 36IN 8976H

## (undated) DEVICE — ST ACC MICROPUNCTURE STFF/CANN PLAT/TP 4F 21G 40CM

## (undated) DEVICE — BG BLD SYS

## (undated) DEVICE — CANN VESL FREE FLO BLNT TP 3MM